# Patient Record
Sex: MALE | Race: WHITE | Employment: OTHER | URBAN - NONMETROPOLITAN AREA
[De-identification: names, ages, dates, MRNs, and addresses within clinical notes are randomized per-mention and may not be internally consistent; named-entity substitution may affect disease eponyms.]

---

## 2023-12-12 ENCOUNTER — APPOINTMENT (OUTPATIENT)
Dept: GENERAL RADIOLOGY | Age: 78
End: 2023-12-12
Payer: MEDICARE

## 2023-12-12 ENCOUNTER — HOSPITAL ENCOUNTER (EMERGENCY)
Age: 78
Discharge: HOME | End: 2023-12-12
Attending: EMERGENCY MEDICINE
Payer: MEDICARE

## 2023-12-12 VITALS
WEIGHT: 255 LBS | HEIGHT: 70 IN | OXYGEN SATURATION: 97 % | HEART RATE: 57 BPM | TEMPERATURE: 97.6 F | RESPIRATION RATE: 16 BRPM | SYSTOLIC BLOOD PRESSURE: 143 MMHG | BODY MASS INDEX: 36.51 KG/M2 | DIASTOLIC BLOOD PRESSURE: 76 MMHG

## 2023-12-12 DIAGNOSIS — M25.552 LEFT HIP PAIN: ICD-10-CM
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
## 2023-12-12 PROCEDURE — 90715 TDAP VACCINE 7 YRS/> IM: CPT

## 2023-12-12 PROCEDURE — 73502 X-RAY EXAM HIP UNI 2-3 VIEWS: CPT

## 2023-12-12 PROCEDURE — 90471 IMMUNIZATION ADMIN: CPT

## 2023-12-12 PROCEDURE — 6360000002 HC RX W HCPCS

## 2023-12-12 PROCEDURE — 99284 EMERGENCY DEPT VISIT MOD MDM: CPT

## 2023-12-12 RX ADMIN — TETANUS TOXOID, REDUCED DIPHTHERIA TOXOID AND ACELLULAR PERTUSSIS VACCINE, ADSORBED 0.5 ML: 5; 2.5; 8; 8; 2.5 SUSPENSION INTRAMUSCULAR at 18:55

## 2023-12-12 ASSESSMENT — PAIN DESCRIPTION - LOCATION: LOCATION: HIP

## 2023-12-12 ASSESSMENT — PAIN DESCRIPTION - PAIN TYPE: TYPE: ACUTE PAIN

## 2023-12-12 ASSESSMENT — PAIN DESCRIPTION - ORIENTATION: ORIENTATION: LEFT

## 2023-12-12 ASSESSMENT — PAIN DESCRIPTION - DESCRIPTORS: DESCRIPTORS: ACHING

## 2023-12-12 ASSESSMENT — PAIN - FUNCTIONAL ASSESSMENT: PAIN_FUNCTIONAL_ASSESSMENT: 0-10

## 2023-12-12 ASSESSMENT — PAIN SCALES - GENERAL: PAINLEVEL_OUTOF10: 4

## 2023-12-12 NOTE — ED PROVIDER NOTES

## 2023-12-12 NOTE — ED NOTES
Pt arrives to the ED for a mechanical fall that occurred today as the pt was attempting to get his trach to the curb. Pt states he tripped over the curb and landed on his left hip. Pt denies any loc, or dizziness at the time of the event. Pt states he is on eloquis. Pt rates his pain a 4/10.  STEVEN Negrete RN  12/12/23 [id number]

## 2023-12-12 NOTE — ED PROVIDER NOTES
[facility] DEPT  EMERGENCY DEPARTMENT ENCOUNTER          Pt Name: Kvng Sanchez  MRN: [id number]  [address] [facility] [place] 1945  Date of evaluation: 12/12/2023  Physician: Cornel Barron MD  Supervising Attending Physician: Willie Snow MD       CHIEF COMPLAINT       Chief Complaint   Patient presents with    Fall    Hip Pain         HISTORY OF PRESENT ILLNESS    HPI  Kvng Sanchez is a 78 y.o. male who with a tree of diabetes, COPD, paroxysmal A-fib on Eliquis 5 mg twice daily who presents for evaluation after mechanical fall when he tripped over the curb while taking out the trash. This afternoon, the patient was taking a trash bin out to the curb and not using his four-wheel walker. He fell onto his left hip and also injured his left forearm and has some dried blood and abrasions there. He did not hit his head, did not hurt his neck, did not lose consciousness, did not have any prodromal symptoms such as dizziness, headache, lightheadedness, chest pain, palpitations, shortness of breath. He was not able to help himself all the way to his feet and EMTs had to help him up into the stretcher. He has not taken steps since the incident occurred, though he thinks he probably could. Patient unsure when last received tetanus. REVIEW OF SYSTEMS   Review of Systems  As above in HPI    PAST MEDICAL AND SURGICAL HISTORY     Past Medical History:   Diagnosis Date    COPD (chronic obstructive pulmonary disease) ([facility])     Diabetes mellitus ([facility])      History reviewed. No pertinent surgical history. MEDICATIONS   noted to be mildly hypertensive. No current facility-administered medications for this encounter.     Current Outpatient Medications:     apixaban (ELIQUIS) 5 MG TABS tablet, Take 1 tablet by mouth 2 times daily, Disp: , Rfl:     Previous Medications    APIXABAN (ELIQUIS) 5 MG TABS TABLET    Take 1 tablet by mouth 2 times daily         SOCIAL HISTORY     Social History

## 2024-03-23 ENCOUNTER — HOSPITAL ENCOUNTER (EMERGENCY)
Age: 79
Discharge: HOME | End: 2024-03-23
Attending: EMERGENCY MEDICINE
Payer: MEDICARE

## 2024-03-23 ENCOUNTER — APPOINTMENT (OUTPATIENT)
Dept: GENERAL RADIOLOGY | Age: 79
End: 2024-03-23
Payer: MEDICARE

## 2024-03-23 ENCOUNTER — APPOINTMENT (OUTPATIENT)
Dept: CT IMAGING | Age: 79
End: 2024-03-23
Payer: MEDICARE

## 2024-03-23 VITALS
HEIGHT: 70 IN | RESPIRATION RATE: 13 BRPM | TEMPERATURE: 97.9 F | HEART RATE: 67 BPM | WEIGHT: 255 LBS | SYSTOLIC BLOOD PRESSURE: 150 MMHG | BODY MASS INDEX: 36.51 KG/M2 | DIASTOLIC BLOOD PRESSURE: 80 MMHG | OXYGEN SATURATION: 96 %

## 2024-03-23 DIAGNOSIS — H81.10 BENIGN PAROXYSMAL POSITIONAL VERTIGO, UNSPECIFIED LATERALITY: ICD-10-CM
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
## 2024-03-23 LAB
ALBUMIN SERPL BCG-MCNC: 3.9 G/DL (ref 3.5–5.1)
ALP SERPL-CCNC: 92 U/L (ref 38–126)
ALT SERPL W/O P-5'-P-CCNC: 11 U/L (ref 11–66)
ANION GAP SERPL CALC-SCNC: 17 MEQ/L (ref 8–16)
APTT PPP: 31.6 SECONDS (ref 22–38)
AST SERPL-CCNC: 17 U/L (ref 5–40)
BASOPHILS ABSOLUTE: 0.1 THOU/MM3 (ref 0–0.1)
BASOPHILS NFR BLD AUTO: 1 %
BILIRUB SERPL-MCNC: 0.7 MG/DL (ref 0.3–1.2)
BUN SERPL-MCNC: 16 MG/DL (ref 7–22)
CALCIUM SERPL-MCNC: 8.9 MG/DL (ref 8.5–10.5)
CHLORIDE SERPL-SCNC: 103 MEQ/L (ref 98–111)
CO2 SERPL-SCNC: 22 MEQ/L (ref 23–33)
CREAT SERPL-MCNC: 1.1 MG/DL (ref 0.4–1.2)
DEPRECATED RDW RBC AUTO: 48.5 FL (ref 35–45)
EOSINOPHIL NFR BLD AUTO: 1.6 %
EOSINOPHILS ABSOLUTE: 0.1 THOU/MM3 (ref 0–0.4)
ERYTHROCYTE [DISTWIDTH] IN BLOOD BY AUTOMATED COUNT: 13.9 % (ref 11.5–14.5)
GFR SERPL CREATININE-BSD FRML MDRD: > 60 ML/MIN/1.73M2
GLUCOSE SERPL-MCNC: 229 MG/DL (ref 70–108)
HCT VFR BLD AUTO: 43.5 % (ref 42–52)
HGB BLD-MCNC: 14.5 GM/DL (ref 14–18)
IMM GRANULOCYTES # BLD AUTO: 0.02 THOU/MM3 (ref 0–0.07)
IMM GRANULOCYTES NFR BLD AUTO: 0.2 %
INR PPP: 0.95 (ref 0.85–1.13)
LYMPHOCYTES ABSOLUTE: 2.4 THOU/MM3 (ref 1–4.8)
LYMPHOCYTES NFR BLD AUTO: 29.2 %
MAGNESIUM SERPL-MCNC: 1.9 MG/DL (ref 1.6–2.4)
MCH RBC QN AUTO: 31.6 PG (ref 26–33)
MCHC RBC AUTO-ENTMCNC: 33.3 GM/DL (ref 32.2–35.5)
MCV RBC AUTO: 94.8 FL (ref 80–94)
MONOCYTES ABSOLUTE: 0.8 THOU/MM3 (ref 0.4–1.3)
MONOCYTES NFR BLD AUTO: 9.3 %
NEUTROPHILS NFR BLD AUTO: 58.7 %
NRBC BLD AUTO-RTO: 0 /100 WBC
NT-PROBNP SERPL IA-MCNC: 800.2 PG/ML (ref 0–449)
OSMOLALITY SERPL CALC.SUM OF ELEC: 291.6 MOSMOL/KG (ref 275–300)
PLATELET # BLD AUTO: 257 THOU/MM3 (ref 130–400)
PMV BLD AUTO: 9.6 FL (ref 9.4–12.4)
POTASSIUM SERPL-SCNC: 3.9 MEQ/L (ref 3.5–5.2)
PROT SERPL-MCNC: 6.8 G/DL (ref 6.1–8)
RBC # BLD AUTO: 4.59 MILL/MM3 (ref 4.7–6.1)
SEGMENTED NEUTROPHILS ABSOLUTE COUNT: 4.9 THOU/MM3 (ref 1.8–7.7)
SODIUM SERPL-SCNC: 142 MEQ/L (ref 135–145)
TROPONIN, HIGH SENSITIVITY: 30 NG/L (ref 0–12)
TROPONIN, HIGH SENSITIVITY: 31 NG/L (ref 0–12)
WBC # BLD AUTO: 8.3 THOU/MM3 (ref 4.8–10.8)

## 2024-03-23 PROCEDURE — 85025 COMPLETE CBC W/AUTO DIFF WBC: CPT

## 2024-03-23 PROCEDURE — 72125 CT NECK SPINE W/O DYE: CPT

## 2024-03-23 PROCEDURE — 70450 CT HEAD/BRAIN W/O DYE: CPT

## 2024-03-23 PROCEDURE — 83880 ASSAY OF NATRIURETIC PEPTIDE: CPT

## 2024-03-23 PROCEDURE — 36415 COLL VENOUS BLD VENIPUNCTURE: CPT

## 2024-03-23 PROCEDURE — 85610 PROTHROMBIN TIME: CPT

## 2024-03-23 PROCEDURE — 80053 COMPREHEN METABOLIC PANEL: CPT

## 2024-03-23 PROCEDURE — 83735 ASSAY OF MAGNESIUM: CPT

## 2024-03-23 PROCEDURE — 99284 EMERGENCY DEPT VISIT MOD MDM: CPT

## 2024-03-23 PROCEDURE — 84484 ASSAY OF TROPONIN QUANT: CPT

## 2024-03-23 PROCEDURE — 72170 X-RAY EXAM OF PELVIS: CPT

## 2024-03-23 PROCEDURE — 71045 X-RAY EXAM CHEST 1 VIEW: CPT

## 2024-03-23 PROCEDURE — 85730 THROMBOPLASTIN TIME PARTIAL: CPT

## 2024-03-23 ASSESSMENT — PAIN DESCRIPTION - DESCRIPTORS: DESCRIPTORS: BURNING

## 2024-03-23 ASSESSMENT — PAIN - FUNCTIONAL ASSESSMENT: PAIN_FUNCTIONAL_ASSESSMENT: 0-10

## 2024-03-23 ASSESSMENT — PAIN DESCRIPTION - LOCATION: LOCATION: ARM

## 2024-03-23 ASSESSMENT — PAIN SCALES - GENERAL: PAINLEVEL_OUTOF10: 5

## 2024-03-24 NOTE — ED PROVIDER NOTES
I performed a history and physical examination of the patient and discussed management with the resident. I reviewed the resident’s note and agree with the documented findings and plan of care. Any areas of disagreement are noted on the chart. I was personally present for the key portions of any procedures. I have documented in the chart those procedures where I was not present during the key portions. I have reviewed the emergency nurses triage note. I agree with the chief complaint, past medical history, past surgical history, allergies, medications, social and family history as documented unless otherwise noted below. Documentation of the HPI, Physical Exam and Medical Decision Making performed by medical students or scribes is based on my personal performance of the HPI, PE and MDM. For Phys Assistant/ Nurse Practitioner cases/documentation I have personally evaluated this patient and have completed at least one if not all key elements of the E/M (history, physical exam, and MDM). My findings are as noted below.    In other words, I personally saw and examined the patient I have reviewed and agreed with the resident findings including all diagnostic interpretations and treatment plans as written.  I was present for the key portion of any procedures performed and the inclusive time noted in any critical care statement.    Patient presents today after a fall.  Apparently had a fall yesterday and fall today.  Today's fall happened approximately 1 hour prior to arrival.  Patient states that he lost his balance and fell back.  He had no loss of consciousness.  Did strike the back of his head.  Did scratch of his right forearm.  Patient states he is not really in any pain at this time.  They were concerned because he had some nausea and vomiting after the fall.  Here today the patient is neurologically intact.  He has a hematoma on the back of the skull.  Visual acuity appears to be intact.  Of note he does have some

## 2024-03-24 NOTE — ED PROVIDER NOTES
[facility] EMERGENCY DEPT  EMERGENCY DEPARTMENT ENCOUNTER          Pt Name: El Leo  MRN: [id number]  Birthdate 1945  Date of evaluation: 3/23/2024  Physician: Rashid Diaz MD  Supervising Attending Physician: No att. providers found       CHIEF COMPLAINT       Chief Complaint   Patient presents with    Fall         HISTORY OF PRESENT ILLNESS    HPI  El Leo is a 78 y.o. male who presents to the emergency department from home, brought in by EMS for evaluation of fall.  Per EMS, patient has multiple falls past couple days.  He had a fall yesterday afternoon and then again this afternoon.  Patient reports that this afternoon he was walking in the kitchen when his wife called for him.  He states that he turned around and lost his balance falling backwards.  Patient reports that he did hit the back of his head.  Patient denies any LOC.  Patient denies any pain anywhere.  Denies any neck or back pain.  He reports that he has not been taking his Eliquis for the past 5 days because he ran out.  Patient reports he thinks he is taking it for A-fib.  Patient denies any lightheadedness or dizziness prior to the fall.  The patient has no other acute complaints at this time.            PAST MEDICAL AND SURGICAL HISTORY     Past Medical History:   Diagnosis Date    COPD (chronic obstructive pulmonary disease) (HCC)     Diabetes mellitus (HCC)      History reviewed. No pertinent surgical history.      MEDICATIONS   No current facility-administered medications for this encounter.    Current Outpatient Medications:     apixaban (ELIQUIS) 5 MG TABS tablet, Take 1 tablet by mouth 2 times daily, Disp: , Rfl:     Discharge Medication List as of 3/23/2024 11:49 PM        CONTINUE these medications which have NOT CHANGED    Details   apixaban (ELIQUIS) 5 MG TABS tablet Take 1 tablet by mouth 2 times dailyHistorical Med               SOCIAL HISTORY     Social History     Social History Narrative

## 2024-03-24 NOTE — ED TRIAGE NOTES
Pt presents to ED via EMS due to fall. Pt fell approximately 1 hour ago and 24 hours ago. Pt states grandchildren called EMS today because of nausea and vomiting after the fall. Pt has a small laceration on head and arm from fall 24 hours ago. Pt states he hit his head but did not lose consciousness. Pt denies neck pain, shortness of breath, and chest pain at this time. Pt was taking Eloquis but stopped about 5 days ago because he ran out.

## 2024-03-24 NOTE — DISCHARGE INSTRUCTIONS
Return to the ED mainly for any change or worsening symptoms including chest pain, shortness of breath, dizziness, nausea or vomiting.

## 2024-03-31 SDOH — HEALTH STABILITY: PHYSICAL HEALTH: ON AVERAGE, HOW MANY DAYS PER WEEK DO YOU ENGAGE IN MODERATE TO STRENUOUS EXERCISE (LIKE A BRISK WALK)?: 0 DAYS

## 2024-04-02 NOTE — PROGRESS NOTES
cervical carotid arteries and vertebral flow.     FINDINGS:   RIGHT CCA:                          Normal.                                    PSV: 74.2 cm/sec. EDV:   10.3 cm/sec.   RIGHT ICA PROXIMAL:                 Normal.                                    PSV: 90.8 cm/sec. EDV:   0.0 cm/sec.   RIGHT ICA DISTAL:                   Normal.                                    PSV: 52.0 cm/sec. EDV:   11.6 cm/sec.   RIGHT ECA:                          Normal.                                    PSV: 64.1 cm/sec. EDV:   12.0 cm/sec.   RIGHT VERTEBRAL:                    Not well visualized.   ICA/CCA SYSTOLIC RATIO:             1.2       LEFT CCA:                           Normal.                                    PSV: 64.4 cm/sec. EDV:   12.8 cm/sec.   LEFT ICA PROXIMAL:                  Normal.                                    PSV: 77.4 cm/sec. EDV:   19.2 cm/sec.   LEFT ICA DISTAL:                    Normal.                                    PSV: 75.8 cm/sec. EDV:   17.6 cm/sec.   ECA:                                Normal.                                    PSV: 59.6 cm/sec. EDV:   0.0 cm/sec.   LEFT VERTEBRAL:                     Not well visualized.   ICA/CCA SYSTOLIC RATIO:             1.2     OTHER:                              Negative.         GUIDELINES:   STENOSIS          ICA PSV                     PLAQUE                        ICA/CCA PSV RATIO   Normal            <125 cm/sec                 None                          <2.0                                                Continued Report - Page 2 of 2     Patient Name: FAIZA TIAN JR                                            YOB: 1945   MRN: [id number]                           Patient Type: Out                  Accession Number: [id number]   Account: [id number]                     Room #:                            Age: 76              Sex: M   Distributed to: MALENA KEBEDE                                          Date:

## 2024-04-02 NOTE — PATIENT INSTRUCTIONS
LAB INSTRUCTIONS:    Please complete labs within 4 week(s).    Please fast for 8 hours prior to lab collection.    The clinic will call you within 1 week of collection. If you have not heard from us within that amount of time, please call us at [phone].

## 2024-04-03 ENCOUNTER — TELEPHONE (OUTPATIENT)
Dept: CARDIOLOGY CLINIC | Age: 79
End: 2024-04-03

## 2024-04-03 ENCOUNTER — OFFICE VISIT (OUTPATIENT)
Dept: FAMILY MEDICINE CLINIC | Age: 79
End: 2024-04-03
Payer: MEDICARE

## 2024-04-03 VITALS
OXYGEN SATURATION: 95 % | DIASTOLIC BLOOD PRESSURE: 80 MMHG | BODY MASS INDEX: 34.07 KG/M2 | TEMPERATURE: 97.7 F | WEIGHT: 238 LBS | HEIGHT: 70 IN | RESPIRATION RATE: 18 BRPM | HEART RATE: 54 BPM | SYSTOLIC BLOOD PRESSURE: 130 MMHG

## 2024-04-03 DIAGNOSIS — I25.10 ASHD (ARTERIOSCLEROTIC HEART DISEASE): ICD-10-CM

## 2024-04-03 DIAGNOSIS — E11.69 TYPE 2 DIABETES MELLITUS WITH OTHER SPECIFIED COMPLICATION, WITH LONG-TERM CURRENT USE OF INSULIN (HCC): ICD-10-CM

## 2024-04-03 DIAGNOSIS — I10 HYPERTENSION, ESSENTIAL: ICD-10-CM

## 2024-04-03 DIAGNOSIS — Z87.442 HISTORY OF KIDNEY STONES: ICD-10-CM

## 2024-04-03 DIAGNOSIS — J96.11 CHRONIC RESPIRATORY FAILURE WITH HYPOXIA (HCC): ICD-10-CM

## 2024-04-03 DIAGNOSIS — K21.9 GASTROESOPHAGEAL REFLUX DISEASE, UNSPECIFIED WHETHER ESOPHAGITIS PRESENT: ICD-10-CM

## 2024-04-03 DIAGNOSIS — R26.89 BALANCE PROBLEM: ICD-10-CM

## 2024-04-03 DIAGNOSIS — E78.5 DYSLIPIDEMIA: ICD-10-CM

## 2024-04-03 DIAGNOSIS — Z86.73 HISTORY OF CVA (CEREBROVASCULAR ACCIDENT): ICD-10-CM

## 2024-04-03 DIAGNOSIS — Z86.79 H/O SICK SINUS SYNDROME: ICD-10-CM

## 2024-04-03 DIAGNOSIS — J44.9 CHRONIC OBSTRUCTIVE PULMONARY DISEASE, UNSPECIFIED COPD TYPE (HCC): ICD-10-CM

## 2024-04-03 DIAGNOSIS — Z79.4 TYPE 2 DIABETES MELLITUS WITH OTHER SPECIFIED COMPLICATION, WITH LONG-TERM CURRENT USE OF INSULIN (HCC): ICD-10-CM

## 2024-04-03 DIAGNOSIS — I48.0 PAROXYSMAL ATRIAL FIBRILLATION (HCC): ICD-10-CM

## 2024-04-03 DIAGNOSIS — G47.33 OSA ON CPAP: ICD-10-CM

## 2024-04-03 DIAGNOSIS — E66.9 OBESITY (BMI 30-39.9): ICD-10-CM

## 2024-04-03 DIAGNOSIS — J61 ASBESTOSIS (HCC): ICD-10-CM

## 2024-04-03 DIAGNOSIS — R42 CHRONIC VERTIGO: Primary | ICD-10-CM

## 2024-04-03 DIAGNOSIS — M51.36 DDD (DEGENERATIVE DISC DISEASE), LUMBAR: ICD-10-CM

## 2024-04-03 DIAGNOSIS — R97.20 ELEVATED PSA: ICD-10-CM

## 2024-04-03 PROBLEM — I48.91 ATRIAL FIBRILLATION (HCC): Status: ACTIVE | Noted: 2024-04-03

## 2024-04-03 PROBLEM — E11.9 DM2 (DIABETES MELLITUS, TYPE 2) (HCC): Chronic | Status: ACTIVE | Noted: 2024-04-03

## 2024-04-03 PROBLEM — I48.91 ATRIAL FIBRILLATION (HCC): Chronic | Status: ACTIVE | Noted: 2024-04-03

## 2024-04-03 PROBLEM — M51.369 DDD (DEGENERATIVE DISC DISEASE), LUMBAR: Chronic | Status: ACTIVE | Noted: 2024-04-03

## 2024-04-03 PROBLEM — E11.9 DM2 (DIABETES MELLITUS, TYPE 2) (HCC): Status: ACTIVE | Noted: 2024-04-03

## 2024-04-03 PROBLEM — M18.11 OSTEOARTHRITIS OF CARPOMETACARPAL (CMC) JOINT OF RIGHT THUMB: Status: ACTIVE | Noted: 2024-04-03

## 2024-04-03 PROBLEM — M51.369 DDD (DEGENERATIVE DISC DISEASE), LUMBAR: Status: ACTIVE | Noted: 2024-04-03

## 2024-04-03 PROBLEM — M18.11 OSTEOARTHRITIS OF CARPOMETACARPAL (CMC) JOINT OF RIGHT THUMB: Chronic | Status: ACTIVE | Noted: 2024-04-03

## 2024-04-03 LAB — HBA1C MFR BLD: 8 % (ref 4.3–5.7)

## 2024-04-03 PROCEDURE — 3075F SYST BP GE 130 - 139MM HG: CPT | Performed by: FAMILY MEDICINE

## 2024-04-03 PROCEDURE — 1123F ACP DISCUSS/DSCN MKR DOCD: CPT | Performed by: FAMILY MEDICINE

## 2024-04-03 PROCEDURE — G8417 CALC BMI ABV UP PARAM F/U: HCPCS | Performed by: FAMILY MEDICINE

## 2024-04-03 PROCEDURE — 3052F HG A1C>EQUAL 8.0%<EQUAL 9.0%: CPT | Performed by: FAMILY MEDICINE

## 2024-04-03 PROCEDURE — 99205 OFFICE O/P NEW HI 60 MIN: CPT | Performed by: FAMILY MEDICINE

## 2024-04-03 PROCEDURE — G8427 DOCREV CUR MEDS BY ELIG CLIN: HCPCS | Performed by: FAMILY MEDICINE

## 2024-04-03 PROCEDURE — 1036F TOBACCO NON-USER: CPT | Performed by: FAMILY MEDICINE

## 2024-04-03 PROCEDURE — 3023F SPIROM DOC REV: CPT | Performed by: FAMILY MEDICINE

## 2024-04-03 PROCEDURE — 3079F DIAST BP 80-89 MM HG: CPT | Performed by: FAMILY MEDICINE

## 2024-04-03 RX ORDER — LISINOPRIL AND HYDROCHLOROTHIAZIDE 25; 20 MG/1; MG/1
1 TABLET ORAL DAILY
Status: ON HOLD | COMMUNITY

## 2024-04-03 RX ORDER — ACETAMINOPHEN 160 MG
2000 TABLET,DISINTEGRATING ORAL DAILY
Status: ON HOLD | COMMUNITY

## 2024-04-03 RX ORDER — MECLIZINE HCL 12.5 MG/1
12.5 TABLET ORAL 3 TIMES DAILY PRN
Status: ON HOLD | COMMUNITY

## 2024-04-03 RX ORDER — INSULIN LISPRO 100 [IU]/ML
INJECTION, SOLUTION INTRAVENOUS; SUBCUTANEOUS
Status: ON HOLD | COMMUNITY

## 2024-04-03 RX ORDER — BUDESONIDE AND FORMOTEROL FUMARATE DIHYDRATE 160; 4.5 UG/1; UG/1
2 AEROSOL RESPIRATORY (INHALATION) 2 TIMES DAILY
Status: ON HOLD | COMMUNITY

## 2024-04-03 RX ORDER — FLUTICASONE PROPIONATE 50 MCG
1 SPRAY, SUSPENSION (ML) NASAL DAILY PRN
Status: ON HOLD | COMMUNITY

## 2024-04-03 RX ORDER — ATORVASTATIN CALCIUM 80 MG/1
80 TABLET, FILM COATED ORAL DAILY
Status: ON HOLD | COMMUNITY

## 2024-04-03 RX ORDER — MULTIVITAMIN WITH IRON
250 TABLET ORAL DAILY
Status: ON HOLD | COMMUNITY

## 2024-04-03 RX ORDER — METOPROLOL TARTRATE 50 MG/1
50 TABLET, FILM COATED ORAL 2 TIMES DAILY
Status: ON HOLD | COMMUNITY

## 2024-04-03 RX ORDER — OMEPRAZOLE 20 MG/1
20 CAPSULE, DELAYED RELEASE ORAL DAILY
Status: ON HOLD | COMMUNITY

## 2024-04-03 RX ORDER — ALLOPURINOL 300 MG/1
300 TABLET ORAL DAILY
Status: ON HOLD | COMMUNITY

## 2024-04-03 RX ORDER — INSULIN GLARGINE 100 [IU]/ML
INJECTION, SOLUTION SUBCUTANEOUS
Status: ON HOLD | COMMUNITY

## 2024-04-03 RX ORDER — ALBUTEROL SULFATE 90 UG/1
2 AEROSOL, METERED RESPIRATORY (INHALATION) EVERY 4 HOURS PRN
Status: ON HOLD | COMMUNITY
Start: 2017-04-26

## 2024-04-03 SDOH — ECONOMIC STABILITY: FOOD INSECURITY: WITHIN THE PAST 12 MONTHS, THE FOOD YOU BOUGHT JUST DIDN'T LAST AND YOU DIDN'T HAVE MONEY TO GET MORE.: NEVER TRUE

## 2024-04-03 SDOH — ECONOMIC STABILITY: FOOD INSECURITY: WITHIN THE PAST 12 MONTHS, YOU WORRIED THAT YOUR FOOD WOULD RUN OUT BEFORE YOU GOT MONEY TO BUY MORE.: NEVER TRUE

## 2024-04-03 SDOH — ECONOMIC STABILITY: HOUSING INSECURITY
IN THE LAST 12 MONTHS, WAS THERE A TIME WHEN YOU DID NOT HAVE A STEADY PLACE TO SLEEP OR SLEPT IN A SHELTER (INCLUDING NOW)?: NO

## 2024-04-03 SDOH — ECONOMIC STABILITY: INCOME INSECURITY: HOW HARD IS IT FOR YOU TO PAY FOR THE VERY BASICS LIKE FOOD, HOUSING, MEDICAL CARE, AND HEATING?: NOT HARD AT ALL

## 2024-04-03 ASSESSMENT — PATIENT HEALTH QUESTIONNAIRE - PHQ9
SUM OF ALL RESPONSES TO PHQ QUESTIONS 1-9: 0
SUM OF ALL RESPONSES TO PHQ9 QUESTIONS 1 & 2: 0
2. FEELING DOWN, DEPRESSED OR HOPELESS: NOT AT ALL
1. LITTLE INTEREST OR PLEASURE IN DOING THINGS: NOT AT ALL
SUM OF ALL RESPONSES TO PHQ QUESTIONS 1-9: 0

## 2024-04-03 NOTE — TELEPHONE ENCOUNTER
LM for pt to return call.    Pt is needing a NP appt.  Diagnosis   I25.10 (ICD-10-CM) - ASHD (arteriosclerotic heart disease)   I48.0 (ICD-10-CM) - Paroxysmal atrial fibrillation

## 2024-04-04 ENCOUNTER — CARE COORDINATION (OUTPATIENT)
Dept: CARE COORDINATION | Age: 79
End: 2024-04-04

## 2024-04-04 NOTE — CARE COORDINATION
Spoke with daughter and I am mailing them his portion of the [employer] geraldo and faxing Paulette his portion.

## 2024-04-04 NOTE — CARE COORDINATION
Left patients daughter a message to give me a call back to go over the [facility] program that the patient has been in previously .         [facility]  CC   Medication Assistance  [facility] [place] [employer]    (P) [phone]  (F) [phone]

## 2024-04-06 ENCOUNTER — APPOINTMENT (OUTPATIENT)
Dept: CT IMAGING | Age: 79
End: 2024-04-06
Payer: MEDICARE

## 2024-04-06 ENCOUNTER — HOSPITAL ENCOUNTER (INPATIENT)
Age: 79
LOS: 12 days | Discharge: OTHER INSTITUTION | End: 2024-04-18
Attending: EMERGENCY MEDICINE | Admitting: INTERNAL MEDICINE
Payer: MEDICARE

## 2024-04-06 ENCOUNTER — APPOINTMENT (OUTPATIENT)
Dept: GENERAL RADIOLOGY | Age: 79
End: 2024-04-06
Payer: MEDICARE

## 2024-04-06 DIAGNOSIS — R29.6 FREQUENT FALLS: ICD-10-CM

## 2024-04-06 DIAGNOSIS — I48.20 CHRONIC ATRIAL FIBRILLATION (HCC): ICD-10-CM

## 2024-04-06 DIAGNOSIS — R41.0 DELIRIUM: ICD-10-CM

## 2024-04-06 DIAGNOSIS — E87.6 HYPOKALEMIA: ICD-10-CM

## 2024-04-06 DIAGNOSIS — R41.82 ALTERED MENTAL STATUS, UNSPECIFIED ALTERED MENTAL STATUS TYPE: Primary | ICD-10-CM

## 2024-04-06 LAB
ALBUMIN SERPL BCG-MCNC: 4.1 G/DL (ref 3.5–5.1)
ALP SERPL-CCNC: 100 U/L (ref 38–126)
ALT SERPL W/O P-5'-P-CCNC: 9 U/L (ref 11–66)
AMMONIA PLAS-MCNC: 11 UMOL/L (ref 11–60)
ANION GAP SERPL CALC-SCNC: 13 MEQ/L (ref 8–16)
APTT PPP: 32.7 SECONDS (ref 22–38)
AST SERPL-CCNC: 16 U/L (ref 5–40)
BASOPHILS ABSOLUTE: 0.1 THOU/MM3 (ref 0–0.1)
BASOPHILS NFR BLD AUTO: 0.9 %
BILIRUB CONJ SERPL-MCNC: 0.3 MG/DL (ref 0–0.3)
BILIRUB SERPL-MCNC: 1 MG/DL (ref 0.3–1.2)
BILIRUB UR QL STRIP.AUTO: NEGATIVE
BUN SERPL-MCNC: 16 MG/DL (ref 7–22)
CALCIUM SERPL-MCNC: 9.3 MG/DL (ref 8.5–10.5)
CHARACTER UR: CLEAR
CHLORIDE SERPL-SCNC: 100 MEQ/L (ref 98–111)
CK SERPL-CCNC: 60 U/L (ref 55–170)
CO2 SERPL-SCNC: 28 MEQ/L (ref 23–33)
COLOR: YELLOW
CREAT SERPL-MCNC: 0.9 MG/DL (ref 0.4–1.2)
DEPRECATED RDW RBC AUTO: 47.4 FL (ref 35–45)
EOSINOPHIL NFR BLD AUTO: 1.4 %
EOSINOPHILS ABSOLUTE: 0.1 THOU/MM3 (ref 0–0.4)
ERYTHROCYTE [DISTWIDTH] IN BLOOD BY AUTOMATED COUNT: 13.6 % (ref 11.5–14.5)
GFR SERPL CREATININE-BSD FRML MDRD: 86 ML/MIN/1.73M2
GLUCOSE SERPL-MCNC: 110 MG/DL (ref 70–108)
GLUCOSE UR QL STRIP.AUTO: NEGATIVE MG/DL
HCT VFR BLD AUTO: 44.5 % (ref 42–52)
HGB BLD-MCNC: 14.7 GM/DL (ref 14–18)
HGB UR QL STRIP.AUTO: NEGATIVE
IMM GRANULOCYTES # BLD AUTO: 0.02 THOU/MM3 (ref 0–0.07)
IMM GRANULOCYTES NFR BLD AUTO: 0.2 %
INR PPP: 0.98 (ref 0.85–1.13)
KETONES UR QL STRIP.AUTO: ABNORMAL
LACTIC ACID, SEPSIS: 1.7 MMOL/L (ref 0.5–1.9)
LYMPHOCYTES ABSOLUTE: 2.2 THOU/MM3 (ref 1–4.8)
LYMPHOCYTES NFR BLD AUTO: 23.9 %
MAGNESIUM SERPL-MCNC: 1.7 MG/DL (ref 1.6–2.4)
MCH RBC QN AUTO: 31.3 PG (ref 26–33)
MCHC RBC AUTO-ENTMCNC: 33 GM/DL (ref 32.2–35.5)
MCV RBC AUTO: 94.7 FL (ref 80–94)
MONOCYTES ABSOLUTE: 0.8 THOU/MM3 (ref 0.4–1.3)
MONOCYTES NFR BLD AUTO: 8.8 %
NEUTROPHILS NFR BLD AUTO: 64.8 %
NITRITE UR QL STRIP: NEGATIVE
NRBC BLD AUTO-RTO: 0 /100 WBC
NT-PROBNP SERPL IA-MCNC: 639.8 PG/ML (ref 0–449)
OSMOLALITY SERPL CALC.SUM OF ELEC: 283.1 MOSMOL/KG (ref 275–300)
PH UR STRIP.AUTO: 5.5 [PH] (ref 5–9)
PLATELET # BLD AUTO: 278 THOU/MM3 (ref 130–400)
PMV BLD AUTO: 9.8 FL (ref 9.4–12.4)
POTASSIUM SERPL-SCNC: 3.2 MEQ/L (ref 3.5–5.2)
PROT SERPL-MCNC: 6.9 G/DL (ref 6.1–8)
PROT UR STRIP.AUTO-MCNC: NEGATIVE MG/DL
RBC # BLD AUTO: 4.7 MILL/MM3 (ref 4.7–6.1)
SEGMENTED NEUTROPHILS ABSOLUTE COUNT: 6 THOU/MM3 (ref 1.8–7.7)
SODIUM SERPL-SCNC: 141 MEQ/L (ref 135–145)
SP GR UR REFRACT.AUTO: 1.01 (ref 1–1.03)
TROPONIN, HIGH SENSITIVITY: 39 NG/L (ref 0–12)
TSH SERPL DL<=0.005 MIU/L-ACNC: 0.53 UIU/ML (ref 0.4–4.2)
UROBILINOGEN, URINE: 0.2 EU/DL (ref 0–1)
WBC # BLD AUTO: 9.2 THOU/MM3 (ref 4.8–10.8)
WBC #/AREA URNS HPF: NEGATIVE /[HPF]

## 2024-04-06 PROCEDURE — 83880 ASSAY OF NATRIURETIC PEPTIDE: CPT

## 2024-04-06 PROCEDURE — 87040 BLOOD CULTURE FOR BACTERIA: CPT

## 2024-04-06 PROCEDURE — 1200000003 HC TELEMETRY R&B

## 2024-04-06 PROCEDURE — 83605 ASSAY OF LACTIC ACID: CPT

## 2024-04-06 PROCEDURE — 80053 COMPREHEN METABOLIC PANEL: CPT

## 2024-04-06 PROCEDURE — 99285 EMERGENCY DEPT VISIT HI MDM: CPT

## 2024-04-06 PROCEDURE — 72125 CT NECK SPINE W/O DYE: CPT

## 2024-04-06 PROCEDURE — 71045 X-RAY EXAM CHEST 1 VIEW: CPT

## 2024-04-06 PROCEDURE — 1200000000 HC SEMI PRIVATE

## 2024-04-06 PROCEDURE — 82248 BILIRUBIN DIRECT: CPT

## 2024-04-06 PROCEDURE — 85610 PROTHROMBIN TIME: CPT

## 2024-04-06 PROCEDURE — 82550 ASSAY OF CK (CPK): CPT

## 2024-04-06 PROCEDURE — 83735 ASSAY OF MAGNESIUM: CPT

## 2024-04-06 PROCEDURE — 84484 ASSAY OF TROPONIN QUANT: CPT

## 2024-04-06 PROCEDURE — 85730 THROMBOPLASTIN TIME PARTIAL: CPT

## 2024-04-06 PROCEDURE — 84443 ASSAY THYROID STIM HORMONE: CPT

## 2024-04-06 PROCEDURE — 70450 CT HEAD/BRAIN W/O DYE: CPT

## 2024-04-06 PROCEDURE — 93005 ELECTROCARDIOGRAM TRACING: CPT | Performed by: EMERGENCY MEDICINE

## 2024-04-06 PROCEDURE — 36415 COLL VENOUS BLD VENIPUNCTURE: CPT

## 2024-04-06 PROCEDURE — 82140 ASSAY OF AMMONIA: CPT

## 2024-04-06 PROCEDURE — 2580000003 HC RX 258: Performed by: EMERGENCY MEDICINE

## 2024-04-06 PROCEDURE — 81003 URINALYSIS AUTO W/O SCOPE: CPT

## 2024-04-06 PROCEDURE — 85025 COMPLETE CBC W/AUTO DIFF WBC: CPT

## 2024-04-06 RX ORDER — SODIUM CHLORIDE 9 MG/ML
INJECTION, SOLUTION INTRAVENOUS CONTINUOUS
Status: DISCONTINUED | OUTPATIENT
Start: 2024-04-06 | End: 2024-04-07 | Stop reason: DRUGHIGH

## 2024-04-06 RX ADMIN — SODIUM CHLORIDE: 9 INJECTION, SOLUTION INTRAVENOUS at 21:40

## 2024-04-06 ASSESSMENT — PAIN - FUNCTIONAL ASSESSMENT
PAIN_FUNCTIONAL_ASSESSMENT: NONE - DENIES PAIN

## 2024-04-07 LAB
ANION GAP SERPL CALC-SCNC: 14 MEQ/L (ref 8–16)
BUN SERPL-MCNC: 15 MG/DL (ref 7–22)
CALCIUM SERPL-MCNC: 8.5 MG/DL (ref 8.5–10.5)
CHLORIDE SERPL-SCNC: 105 MEQ/L (ref 98–111)
CO2 SERPL-SCNC: 25 MEQ/L (ref 23–33)
CREAT SERPL-MCNC: 0.8 MG/DL (ref 0.4–1.2)
DEPRECATED RDW RBC AUTO: 47.5 FL (ref 35–45)
ERYTHROCYTE [DISTWIDTH] IN BLOOD BY AUTOMATED COUNT: 13.9 % (ref 11.5–14.5)
GFR SERPL CREATININE-BSD FRML MDRD: 89 ML/MIN/1.73M2
GLUCOSE BLD STRIP.AUTO-MCNC: 107 MG/DL (ref 70–108)
GLUCOSE BLD STRIP.AUTO-MCNC: 118 MG/DL (ref 70–108)
GLUCOSE BLD STRIP.AUTO-MCNC: 72 MG/DL (ref 70–108)
GLUCOSE BLD STRIP.AUTO-MCNC: 78 MG/DL (ref 70–108)
GLUCOSE SERPL-MCNC: 68 MG/DL (ref 70–108)
HCT VFR BLD AUTO: 40.2 % (ref 42–52)
HGB BLD-MCNC: 13.4 GM/DL (ref 14–18)
MCH RBC QN AUTO: 31.2 PG (ref 26–33)
MCHC RBC AUTO-ENTMCNC: 33.3 GM/DL (ref 32.2–35.5)
MCV RBC AUTO: 93.5 FL (ref 80–94)
PLATELET # BLD AUTO: 248 THOU/MM3 (ref 130–400)
PMV BLD AUTO: 9.7 FL (ref 9.4–12.4)
POTASSIUM SERPL-SCNC: 2.9 MEQ/L (ref 3.5–5.2)
POTASSIUM SERPL-SCNC: 3.2 MEQ/L (ref 3.5–5.2)
RBC # BLD AUTO: 4.3 MILL/MM3 (ref 4.7–6.1)
SODIUM SERPL-SCNC: 144 MEQ/L (ref 135–145)
TROPONIN, HIGH SENSITIVITY: 34 NG/L (ref 0–12)
TROPONIN, HIGH SENSITIVITY: 39 NG/L (ref 0–12)
WBC # BLD AUTO: 11.4 THOU/MM3 (ref 4.8–10.8)

## 2024-04-07 PROCEDURE — 85027 COMPLETE CBC AUTOMATED: CPT

## 2024-04-07 PROCEDURE — 6370000000 HC RX 637 (ALT 250 FOR IP): Performed by: INTERNAL MEDICINE

## 2024-04-07 PROCEDURE — 93010 ELECTROCARDIOGRAM REPORT: CPT | Performed by: INTERNAL MEDICINE

## 2024-04-07 PROCEDURE — 80048 BASIC METABOLIC PNL TOTAL CA: CPT

## 2024-04-07 PROCEDURE — 94640 AIRWAY INHALATION TREATMENT: CPT

## 2024-04-07 PROCEDURE — 1200000003 HC TELEMETRY R&B

## 2024-04-07 PROCEDURE — 2580000003 HC RX 258: Performed by: EMERGENCY MEDICINE

## 2024-04-07 PROCEDURE — 36415 COLL VENOUS BLD VENIPUNCTURE: CPT

## 2024-04-07 PROCEDURE — 1200000000 HC SEMI PRIVATE

## 2024-04-07 PROCEDURE — 2580000003 HC RX 258: Performed by: INTERNAL MEDICINE

## 2024-04-07 PROCEDURE — 84132 ASSAY OF SERUM POTASSIUM: CPT

## 2024-04-07 PROCEDURE — 82948 REAGENT STRIP/BLOOD GLUCOSE: CPT

## 2024-04-07 PROCEDURE — 84484 ASSAY OF TROPONIN QUANT: CPT

## 2024-04-07 PROCEDURE — 6360000002 HC RX W HCPCS: Performed by: INTERNAL MEDICINE

## 2024-04-07 RX ORDER — SODIUM CHLORIDE 9 MG/ML
INJECTION, SOLUTION INTRAVENOUS CONTINUOUS
Status: DISCONTINUED | OUTPATIENT
Start: 2024-04-07 | End: 2024-04-09

## 2024-04-07 RX ORDER — GLUCAGON 1 MG/ML
1 KIT INJECTION PRN
Status: DISCONTINUED | OUTPATIENT
Start: 2024-04-07 | End: 2024-04-18 | Stop reason: HOSPADM

## 2024-04-07 RX ORDER — ACETAMINOPHEN 325 MG/1
650 TABLET ORAL EVERY 6 HOURS PRN
Status: DISCONTINUED | OUTPATIENT
Start: 2024-04-07 | End: 2024-04-18 | Stop reason: HOSPADM

## 2024-04-07 RX ORDER — ONDANSETRON 2 MG/ML
4 INJECTION INTRAMUSCULAR; INTRAVENOUS EVERY 6 HOURS PRN
Status: DISCONTINUED | OUTPATIENT
Start: 2024-04-07 | End: 2024-04-18 | Stop reason: HOSPADM

## 2024-04-07 RX ORDER — POTASSIUM CHLORIDE 20 MEQ/1
40 TABLET, EXTENDED RELEASE ORAL PRN
Status: DISCONTINUED | OUTPATIENT
Start: 2024-04-07 | End: 2024-04-18 | Stop reason: HOSPADM

## 2024-04-07 RX ORDER — LORAZEPAM 2 MG/ML
0.5 INJECTION INTRAMUSCULAR ONCE
Status: COMPLETED | OUTPATIENT
Start: 2024-04-07 | End: 2024-04-07

## 2024-04-07 RX ORDER — DEXTROSE MONOHYDRATE 100 MG/ML
INJECTION, SOLUTION INTRAVENOUS CONTINUOUS PRN
Status: DISCONTINUED | OUTPATIENT
Start: 2024-04-07 | End: 2024-04-18 | Stop reason: HOSPADM

## 2024-04-07 RX ORDER — FERROUS SULFATE 325(65) MG
325 TABLET ORAL
Status: DISCONTINUED | OUTPATIENT
Start: 2024-04-07 | End: 2024-04-18 | Stop reason: HOSPADM

## 2024-04-07 RX ORDER — LANOLIN ALCOHOL/MO/W.PET/CERES
250 CREAM (GRAM) TOPICAL DAILY
Status: DISCONTINUED | OUTPATIENT
Start: 2024-04-07 | End: 2024-04-18 | Stop reason: HOSPADM

## 2024-04-07 RX ORDER — SODIUM CHLORIDE 9 MG/ML
INJECTION, SOLUTION INTRAVENOUS PRN
Status: DISCONTINUED | OUTPATIENT
Start: 2024-04-07 | End: 2024-04-18 | Stop reason: HOSPADM

## 2024-04-07 RX ORDER — INSULIN LISPRO 100 [IU]/ML
0-4 INJECTION, SOLUTION INTRAVENOUS; SUBCUTANEOUS
Status: DISCONTINUED | OUTPATIENT
Start: 2024-04-07 | End: 2024-04-18 | Stop reason: HOSPADM

## 2024-04-07 RX ORDER — MULTIVITAMIN WITH IRON
250 TABLET ORAL DAILY
Status: DISCONTINUED | OUTPATIENT
Start: 2024-04-07 | End: 2024-04-07 | Stop reason: SDUPTHER

## 2024-04-07 RX ORDER — SODIUM CHLORIDE 0.9 % (FLUSH) 0.9 %
5-40 SYRINGE (ML) INJECTION PRN
Status: DISCONTINUED | OUTPATIENT
Start: 2024-04-07 | End: 2024-04-18 | Stop reason: HOSPADM

## 2024-04-07 RX ORDER — POTASSIUM CHLORIDE 7.45 MG/ML
10 INJECTION INTRAVENOUS PRN
Status: DISCONTINUED | OUTPATIENT
Start: 2024-04-07 | End: 2024-04-18 | Stop reason: HOSPADM

## 2024-04-07 RX ORDER — OLANZAPINE 5 MG/1
5 TABLET ORAL NIGHTLY
Status: DISCONTINUED | OUTPATIENT
Start: 2024-04-07 | End: 2024-04-09

## 2024-04-07 RX ORDER — ATORVASTATIN CALCIUM 80 MG/1
80 TABLET, FILM COATED ORAL NIGHTLY
Status: DISCONTINUED | OUTPATIENT
Start: 2024-04-07 | End: 2024-04-18 | Stop reason: HOSPADM

## 2024-04-07 RX ORDER — MAGNESIUM SULFATE IN WATER 40 MG/ML
2000 INJECTION, SOLUTION INTRAVENOUS PRN
Status: DISCONTINUED | OUTPATIENT
Start: 2024-04-07 | End: 2024-04-18 | Stop reason: HOSPADM

## 2024-04-07 RX ORDER — ACETAMINOPHEN 650 MG/1
650 SUPPOSITORY RECTAL EVERY 6 HOURS PRN
Status: DISCONTINUED | OUTPATIENT
Start: 2024-04-07 | End: 2024-04-18 | Stop reason: HOSPADM

## 2024-04-07 RX ORDER — LORAZEPAM 2 MG/ML
0.5 INJECTION INTRAMUSCULAR EVERY 6 HOURS PRN
Status: DISCONTINUED | OUTPATIENT
Start: 2024-04-07 | End: 2024-04-13

## 2024-04-07 RX ORDER — PNV NO.95/FERROUS FUM/FOLIC AC 28MG-0.8MG
250 TABLET ORAL DAILY
Status: DISCONTINUED | OUTPATIENT
Start: 2024-04-07 | End: 2024-04-18 | Stop reason: HOSPADM

## 2024-04-07 RX ORDER — SODIUM CHLORIDE 0.9 % (FLUSH) 0.9 %
5-40 SYRINGE (ML) INJECTION EVERY 12 HOURS SCHEDULED
Status: DISCONTINUED | OUTPATIENT
Start: 2024-04-07 | End: 2024-04-18 | Stop reason: HOSPADM

## 2024-04-07 RX ORDER — HALOPERIDOL 5 MG/ML
2 INJECTION INTRAMUSCULAR EVERY 6 HOURS PRN
Status: DISCONTINUED | OUTPATIENT
Start: 2024-04-07 | End: 2024-04-13

## 2024-04-07 RX ORDER — INSULIN LISPRO 100 [IU]/ML
0-4 INJECTION, SOLUTION INTRAVENOUS; SUBCUTANEOUS NIGHTLY
Status: DISCONTINUED | OUTPATIENT
Start: 2024-04-07 | End: 2024-04-18 | Stop reason: HOSPADM

## 2024-04-07 RX ORDER — LISINOPRIL 20 MG/1
20 TABLET ORAL DAILY
Status: DISCONTINUED | OUTPATIENT
Start: 2024-04-07 | End: 2024-04-18 | Stop reason: HOSPADM

## 2024-04-07 RX ORDER — ONDANSETRON 4 MG/1
4 TABLET, ORALLY DISINTEGRATING ORAL EVERY 8 HOURS PRN
Status: DISCONTINUED | OUTPATIENT
Start: 2024-04-07 | End: 2024-04-18 | Stop reason: HOSPADM

## 2024-04-07 RX ORDER — POLYETHYLENE GLYCOL 3350 17 G/17G
17 POWDER, FOR SOLUTION ORAL DAILY PRN
Status: DISCONTINUED | OUTPATIENT
Start: 2024-04-07 | End: 2024-04-18 | Stop reason: HOSPADM

## 2024-04-07 RX ADMIN — POTASSIUM CHLORIDE 10 MEQ: 7.46 INJECTION, SOLUTION INTRAVENOUS at 11:25

## 2024-04-07 RX ADMIN — POTASSIUM CHLORIDE 10 MEQ: 7.46 INJECTION, SOLUTION INTRAVENOUS at 15:35

## 2024-04-07 RX ADMIN — POTASSIUM CHLORIDE 10 MEQ: 7.46 INJECTION, SOLUTION INTRAVENOUS at 12:26

## 2024-04-07 RX ADMIN — HALOPERIDOL LACTATE 2 MG: 5 INJECTION, SOLUTION INTRAMUSCULAR at 13:28

## 2024-04-07 RX ADMIN — SODIUM CHLORIDE: 9 INJECTION, SOLUTION INTRAVENOUS at 01:02

## 2024-04-07 RX ADMIN — POTASSIUM CHLORIDE 10 MEQ: 7.46 INJECTION, SOLUTION INTRAVENOUS at 18:29

## 2024-04-07 RX ADMIN — POTASSIUM CHLORIDE 10 MEQ: 7.46 INJECTION, SOLUTION INTRAVENOUS at 14:08

## 2024-04-07 RX ADMIN — APIXABAN 5 MG: 5 TABLET, FILM COATED ORAL at 09:50

## 2024-04-07 RX ADMIN — MOMETASONE FUROATE AND FORMOTEROL FUMARATE DIHYDRATE 2 PUFF: 200; 5 AEROSOL RESPIRATORY (INHALATION) at 16:50

## 2024-04-07 RX ADMIN — LORAZEPAM 0.5 MG: 2 INJECTION INTRAMUSCULAR; INTRAVENOUS at 16:39

## 2024-04-07 RX ADMIN — SODIUM CHLORIDE: 9 INJECTION, SOLUTION INTRAVENOUS at 21:51

## 2024-04-07 RX ADMIN — OLANZAPINE 5 MG: 5 TABLET, FILM COATED ORAL at 21:46

## 2024-04-07 RX ADMIN — METOPROLOL TARTRATE 25 MG: 25 TABLET, FILM COATED ORAL at 21:47

## 2024-04-07 RX ADMIN — LORAZEPAM 0.5 MG: 2 INJECTION INTRAMUSCULAR; INTRAVENOUS at 01:57

## 2024-04-07 RX ADMIN — POTASSIUM CHLORIDE 10 MEQ: 7.46 INJECTION, SOLUTION INTRAVENOUS at 16:41

## 2024-04-07 RX ADMIN — METOPROLOL TARTRATE 25 MG: 25 TABLET, FILM COATED ORAL at 09:50

## 2024-04-07 RX ADMIN — LORAZEPAM 0.5 MG: 2 INJECTION INTRAMUSCULAR; INTRAVENOUS at 11:05

## 2024-04-07 RX ADMIN — LORAZEPAM 0.5 MG: 2 INJECTION INTRAMUSCULAR; INTRAVENOUS at 01:36

## 2024-04-07 RX ADMIN — HALOPERIDOL LACTATE 2 MG: 5 INJECTION, SOLUTION INTRAMUSCULAR at 01:54

## 2024-04-07 NOTE — ED TRIAGE NOTES
Patient to ED via AFTD due to AMS and hallucinations per family. Per EMS, patient has been having frequent falls. Is supposed to be on eliquis but has not taken this in about 3 weeks. Ems states patient was seeing things in the back of the truck that were not there. Pt lives at home with wife.

## 2024-04-07 NOTE — H&P
Internal Medicine  History and Physical    Patient:  El Leo  MRN: [id number]      History Obtained From:  patient, family member - daughter, Quality of history:  patient a poor historian  PCP: Main Caldwell DO    CHIEF COMPLAINT:  confusion, aggressive behavior, hallucinations, falls at home    HISTORY OF PRESENT ILLNESS:   The patient is a 78 y.o. male who presents with  increasing confusion, aggressive behavior, hallucinations, falls at home.  He was relocated to [place] by daughter last September cos of confusion noted by family. He lost his 's License following an accident.  He has been forgetful and recently more aggressive towards his wife. He has visual hallucinations, unsteady gait with falls and urinary incontinence.   He said he fell twice at [employer] and hit his hand.  Actually, EMS was called by family member and the patient fell at home multiple times recently.  Patient supposed to be taking Eliquis, he has a remote h/o A-fib as reflected by his EKG today showing atrial fibrillation- which is off for the last 3 weeks due to inability to refill the prescription.      Past Medical History:        Diagnosis Date    Asbestosis ([facility])     ASHD (arteriosclerotic heart disease)     Atrial fibrillation ([facility])     COPD (chronic obstructive pulmonary disease) ([facility])     DDD (degenerative disc disease), lumbar     s/p lumbar fusion with geetha placement    DM2 (diabetes mellitus, type 2) ([facility])     Dyslipidemia     GERD (gastroesophageal reflux disease)     H/O sick sinus syndrome     History of CVA (cerebrovascular accident)     History of small, chronic, cerebral infarcts, involving left frontal and parietal lobe cortices and bilateral cerebellar hemispheres, per MRI of the brain on 04/07/2020    History of kidney stones 04/03/2024    Hypertension, essential     Obesity (BMI 30-39.9)     USMAN on CPAP     Osteoarthritis of carpometacarpal (CMC) joint of right thumb     Chronic thumb pain, right, due to  producing mild central canal and  moderate bilateral neural foraminal narrowing.    C7-T1: Normal disc height without evidence for disk protrusions or bulges.  Normal facets.    Incidental Findings:  No significant incidental findings are seen.   Impression:     Degenerative changes within the cervical spine. No fractures.     XR CHEST 1 VIEW [[id number]]    Collected: 04/06/24 2150    Updated: 04/06/24 2252    Narrative:     Single view of the chest    Comparison:  CR/SR - XR CHEST PORTABLE - 03/23/2024 09:01 PM EDT    Findings: Cardiomegaly, no CHF. Stable pleural calcifications. otherwise  normal heart, lungs and mediastinum. No pneumonia.   Impression:     Impression:  Cardiomegaly, no CHF.    Stable pleural calcifications.       Assessment and Plan   AMS  Confusion in the setting of memory loss and urinary incontinence  Prob dementia  HTN  DM 2  Chronic a fib  hypokalemia    Get MRI brain, B!2, folate, TSH  Replace K  Fall precautions.  Resume eliquis   Am lytes.  Start Zyprexa      Patient Active Problem List   Diagnosis Code    Asbestosis ([facility]) J61    ASHD (arteriosclerotic heart disease) I25.10    Atrial fibrillation ([facility]) I48.91    COPD (chronic obstructive pulmonary disease) ([facility]) J44.9    DDD (degenerative disc disease), lumbar M51.36    DM2 (diabetes mellitus, type 2) ([facility]) E11.9    Dyslipidemia E78.5    GERD (gastroesophageal reflux disease) K21.9    H/O sick sinus syndrome Z86.79    History of CVA (cerebrovascular accident) Z86.73    Hypertension, essential I10    Obesity (BMI 30-39.9) E66.9    USMAN on CPAP G47.33    Osteoarthritis of carpometacarpal (CMC) joint of right thumb M18.11    Balance problem R26.89    Chronic vertigo R42    Elevated PSA R97.20    History of kidney stones Z87.442    Delirium R41.0       Mayte Guzman MD, MD  Admitting Internist

## 2024-04-07 NOTE — ED NOTES
Report received from HINA Marcos. Patient resting in bed. Respirations easy and unlabored. No distress noted. Call light within reach. Provider at the bedside updating family on plan of care. Patient currently hallucinating but cooperative.

## 2024-04-07 NOTE — CARE COORDINATION
Case Management Assessment  Initial Evaluation    Date/Time of Evaluation: 4/7/2024 10:35 AM  Assessment Completed by: Thea Jenkins RN    If patient is discharged prior to next notation, then this note serves as note for discharge by case management.    Patient Name: El Leo                   YOB: 1945  Diagnosis: Hypokalemia [E87.6]  Delirium [R41.0]  Chronic atrial fibrillation (HCC) [I48.20]  Frequent falls [R29.6]  Altered mental status, unspecified altered mental status type [R41.82]                   Date / Time: 4/6/2024  9:08 PM  Location: [address] [place] [address]     Patient Admission Status: Inpatient   Readmission Risk Low 0-14, Mod 15-19), High > 20: No data recorded  Current PCP: Main Caldwell, DO  PCP verified by ?      Chart Reviewed: Yes      History Provided by: Child/Family, Medical Record  Patient Orientation: Unable to Assess    Patient Cognition: Severely Impaired    Hospitalization in the last 30 days (Readmission):  No    If yes, Readmission Assessment in  Navigator will be completed.    Advance Directives:      Code Status: Full Code   Patient's Primary Decision Maker is: Legal Next of Kin (Janet thinks she may have health care POA papers. She will search for them.)      Discharge Planning:    Patient lives with: Spouse/Significant Other Type of Home: Apartment  Primary Care Giver: Family  Patient Support Systems include: Spouse/Significant Other, Children   Current Financial resources: Medicare, Other (Comment) (Secondary through [employer])  Current community resources: None  Current services prior to admission: None            Current DME:              Type of Home Care services:  None    ADLS  Prior functional level: Assistance with the following:, Cooking, Housework, Shopping, Mobility  Current functional level: Assistance with the following:, Cooking, Housework, Shopping, Mobility    Family can provide assistance at DC: Yes  Would you like Case Management to discuss  [R41.82]    Patient Goals/Plan/Treatment Preferences: Spoke to Candido's daughter, Janet. She recently moved her mom and dad here from [facility]. Candido saw Dr. Caldwell for the first time this past Wednesday. Janet reports Candido to be verbally aggressive to his wife. He has been falling repeatedly at home. Janet is planning for SNF placement at discharge. She is planning on applying for Medicaid. SS consult placed.   Transportation/Food Security/Housekeeping Addressed: No issues identified.     Thea Jenkins RN  Case Management Department

## 2024-04-07 NOTE — PROGRESS NOTES
1:10am patient refused all the medications. Then he started to argue with his daughter in the room. Patient was shouting loudly in the room. Nurses came in and patient was combative. Called the police on the scene. Attending physician notified (Dr. Guzman). Ordered 2 doses of atavan and haldol IM. Four point soft restrain and live sitter was also ordered. Electronically signed by Seymour Man RN on 4/7/2024 at 2:28 AM     Patient had potassium of 3.2. Patient refused to take medication for replacement. Attending physician notified at 1:13am. Electronically signed by Seymour Man RN on 4/7/2024

## 2024-04-07 NOTE — PROGRESS NOTES
Pt admitted to  5K20 via cart/stretcher.      IV none infusing into the antecubital right, condition patent and no redness. IV site free of s/s of infection or infiltration. Vital signs obtained. Assessment and data collection initiated.     Two nurse skin assessment performed by Ilya SANTAMARIA and Silva SANTAMARIA. Oriented to room.     Explained patients right to have family, representative or physician notified of their admission.      Policies and procedures for 5K explained. All questions answered with no further questions at this time. Fall prevention and safety brochure discussed with patient and significant other.  Bed alarm on. Call light in reach.      Electronically signed by Seymour Man RN on 4/7/2024 at 12:51 AM

## 2024-04-07 NOTE — ED NOTES
ED to inpatient nurses report      Chief Complaint:  Chief Complaint   Patient presents with    Altered Mental Status    Hallucinations     Present to ED from: home with wife      MOA:     LOC: alert to person; disoriented to time, place, situation  Mobility: Requires assistance * 1  Oxygen Baseline: room air    Current needs required: room air     Code Status:   No Order    What abnormal results were found and what did you give/do to treat them? Labs, CT, fluid bolus  Any procedures or intervention occur?     Mental Status:  Level of Consciousness: Alert (0)    Psych Assessment:        Vitals:  Patient Vitals for the past 24 hrs:   BP Temp Temp src Pulse Resp SpO2 Height Weight   04/06/24 2231 (!) 133/100 -- -- 75 16 96 % -- --   04/06/24 2142 (!) 142/98 -- -- 67 18 98 % -- --   04/06/24 2116 130/64 -- -- -- -- -- -- --   04/06/24 2113 (!) 139/121 97.8 °F (36.6 °C) Oral 69 14 97 % 1.778 m (5' 10\") 108.7 kg (239 lb 9.6 oz)        LDAs:   Peripheral IV 04/06/24 Right Antecubital (Active)   Site Assessment Clean, dry & intact 04/06/24 2138   Line Status Normal saline locked;Brisk blood return 04/06/24 2138   Dressing Status New dressing applied 04/06/24 2138   Dressing Type Primapore 04/06/24 2138   Dressing Intervention New 04/06/24 2138       Ambulatory Status:  No data recorded    Diagnosis:  DISPOSITION Admitted 04/06/2024 11:07:46 PM   Final diagnoses:   Altered mental status, unspecified altered mental status type   Delirium   Frequent falls   Chronic atrial fibrillation (HCC)        Consults:  None     Pain Score:  Pain Assessment  Pain Assessment: None - Denies Pain    C-SSRS:        Sepsis Screening:  Sepsis Risk Score: 1.39    Diablo Fall Risk:       Swallow Screening        Preferred Language:   English      ALLERGIES     Patient has no known allergies.    SURGICAL HISTORY       Past Surgical History:   Procedure Laterality Date    CALCANEAL OSTEOTOMY Bilateral     CARPAL TUNNEL RELEASE Right     CATARACT  Mild increase in A1c at 7.9%.  She does continue to follow closely with Endocrinology.  Numbers are statically satisfactory however I would still like to see her A1c closer to seven.  So continue current medications and follow with Endocrinology.  Kidney function remains satisfactory to continue metformin at this time.  Insulin is managed by Endocrinology. 2 episodes of hypo when late for lunch.    Health maintenance due:  Eye exam.  Referral has been entered.  Has not yet been scheduled.  Will encourage her to do so.

## 2024-04-07 NOTE — PLAN OF CARE
Problem: Discharge Planning  Goal: Discharge to home or other facility with appropriate resources  Outcome: Progressing   SW consult received. See SW note 4/7/24.

## 2024-04-07 NOTE — PROGRESS NOTES
Attempted to take patient's bilateral ankle restraints off. Patient kicked Lay CARDONA. Patient given medication for agitation and bilateral ankle restraints put back on.

## 2024-04-07 NOTE — PLAN OF CARE
Problem: Skin/Tissue Integrity  Goal: Absence of new skin breakdown  Description: 1.  Monitor for areas of redness and/or skin breakdown  2.  Assess vascular access sites hourly  3.  Every 4-6 hours minimum:  Change oxygen saturation probe site  4.  Every 4-6 hours:  If on nasal continuous positive airway pressure, respiratory therapy assess nares and determine need for appliance change or resting period.  Outcome: Progressing     Problem: ABCDS Injury Assessment  Goal: Absence of physical injury  Outcome: Progressing  Flowsheets (Taken 4/7/2024 1747)  Absence of Physical Injury: Implement safety measures based on patient assessment     Problem: Safety - Adult  Goal: Free from fall injury  Outcome: Progressing  Flowsheets (Taken 4/7/2024 1747)  Free From Fall Injury:   Instruct family/caregiver on patient safety   Based on caregiver fall risk screen, instruct family/caregiver to ask for assistance with transferring infant if caregiver noted to have fall risk factors     Problem: Safety - Medical Restraint  Goal: Remains free of injury from restraints (Restraint for Interference with Medical Device)  Description: INTERVENTIONS:  1. Determine that other, less restrictive measures have been tried or would not be effective before applying the restraint  2. Evaluate the patient's condition at the time of restraint application  3. Inform patient/family regarding the reason for restraint  4. Q2H: Monitor safety, psychosocial status, comfort, nutrition and hydration  Outcome: Progressing  Flowsheets (Taken 4/7/2024 1747)  Remains free of injury from restraints (restraint for interference with medical device):   Every 2 hours: Monitor safety, psychosocial status, comfort, nutrition and hydration   Inform patient/family regarding the reason for restraint   Evaluate the patient's condition at the time of restraint application   Determine that other, less restrictive measures have been tried or would not be effective before

## 2024-04-07 NOTE — PROGRESS NOTES
Patient is continually trying to get out of bed, grabbing at anything in reach, pulling the the restraints taut. Sitter at bedside to maintain patient safety.

## 2024-04-07 NOTE — PLAN OF CARE
Problem: Skin/Tissue Integrity  Goal: Absence of new skin breakdown  Description: 1.  Monitor for areas of redness and/or skin breakdown  2.  Assess vascular access sites hourly  3.  Every 4-6 hours minimum:  Change oxygen saturation probe site  4.  Every 4-6 hours:  If on nasal continuous positive airway pressure, respiratory therapy assess nares and determine need for appliance change or resting period.  Outcome: Progressing     Problem: ABCDS Injury Assessment  Goal: Absence of physical injury  Outcome: Progressing     Problem: Safety - Adult  Goal: Free from fall injury  Outcome: Progressing     Problem: Safety - Medical Restraint  Goal: Remains free of injury from restraints (Restraint for Interference with Medical Device)  Description: INTERVENTIONS:  1. Determine that other, less restrictive measures have been tried or would not be effective before applying the restraint  2. Evaluate the patient's condition at the time of restraint application  3. Inform patient/family regarding the reason for restraint  4. Q2H: Monitor safety, psychosocial status, comfort, nutrition and hydration  Outcome: Progressing     Care plan reviewed with patient.  Patient verbalize understanding of the plan of care and contribute to goal setting.

## 2024-04-07 NOTE — CARE COORDINATION
DISCHARGE PLANNING EVALUATION  4/7/24, 1:36 PM EDT    Reason for Referral: SNF placement at discharge  Mental Status: pt sleeping during SW visit, sitter and daughter at bedside  Decision Making: wife  Family/Social/Home Environment: SW met with pt's daughter Janet at bedside. Janet reports she had moved pt and pt's wife in with her back in September.   Current Services including food security, transportation and housekeeping: no current services  Current Equipment:none  Payment Source:Medicare and [employer]  Concerns or Barriers to Discharge: sitter and restraints in place  Post-acute (PAC) provider list was provided to patient. Patient was informed of their freedom to choose PAC provider. Discussed and offered to show the patient the relevant PAC Providers quality and resource use measures on Medicare Compare web site via computer based on patient's goals of care and treatment preferences. Questions regarding selection process were answered.      Teach Back Method used with Janet regarding care plan and needs  Daughter Janet verbalized understanding of the plan of care and contribute to goal setting.       Patient goals, treatment preferences and discharge plan: Probable new SNF placement. Daughter brought in POA and living will paperwork, SW did make copy and placed on chart. Daughter reports they will need to apply for Medicaid. SW did discuss with daughter they can apply for this online, provided information for this. TONY also left a message with Neda with public benefits to help Medicaid process.     Electronically signed by MARIA LUISA Rodriguez on 4/7/2024 at 1:36 PM

## 2024-04-07 NOTE — PROGRESS NOTES
Restraint type: Soft restraint:  right ankle, right wrist, left ankle, and left wrist  Reason for restraints: Poor safety judgment:  History of frequent falls with failure to remember to ask for assistance, Pulling out devices:  Unable to follow directions/instructions, and agitation, restless, aggressive behaviors.  Duration: 24 hours    Restraints must be removed when an alternative is available and effective and/or patient no longer meets criteria.    Orders must be renewed every calendar day or when discontinued.    The MD must conduct a face to face assessment within 1 calendar day of initiation when initial restraint order is verbal.

## 2024-04-07 NOTE — ED PROVIDER NOTES
[facility]  EMERGENCY DEPARTMENT ENCOUNTER        PATIENT NAME: El Leo  MRN: [id number]  : 1945  ALDRIDGE: 2024  PROVIDER: Jude Naranjo MD    Patient was seen and evaluated at 9:24 PM EDT. Nurses Notes are reviewed and I agree except as noted in the HPI.    HISTORY OF PRESENT ILLNESS   El Leo is a 78 y.o. male who presents to Emergency Department with Altered Mental Status and Hallucinations       Patient brought in by EMS for frequent falls.  He also has visual hallucination.  He is poor historian, no family at bedside.  He said he fell twice at [employer] and hit his hand.  Actually, EMS was called by family member and the patient fell at home multiple times recently.  Patient supposed to be taking Eliquis (presumed to be due to A-fib as reflected by his EKG today showing atrial fibrillation) which is off for the last 3 weeks due to inability to refill the prescription.  Patient is not complaining of pain.  He has no focal weakness.  No fever or chills.  No nausea or vomiting.  He has PMH is reviewed as below.    This limited HPI is provided by EMS and medical record review.     PAST MEDICAL HISTORY    has a past medical history of Asbestosis ([facility]), ASHD (arteriosclerotic heart disease), Atrial fibrillation ([facility]), COPD (chronic obstructive pulmonary disease) ([facility]), DDD (degenerative disc disease), lumbar, DM2 (diabetes mellitus, type 2) ([facility]), Dyslipidemia, GERD (gastroesophageal reflux disease), H/O sick sinus syndrome, History of CVA (cerebrovascular accident), History of kidney stones, Hypertension, essential, Obesity (BMI 30-39.9), USMAN on CPAP, and Osteoarthritis of carpometacarpal (CMC) joint of right thumb.    SURGICAL HISTORY      has a past surgical history that includes Cholecystectomy; Lumbar spine surgery; Cataract removal; ventral hernia repair; Lithotripsy; Wrist surgery; Tonsillectomy; Calcaneal osteotomy (Bilateral); ERCP (2019); and Carpal tunnel release  1.7 1.6 - 2.4 mg/dL   TSH with Reflex   Result Value Ref Range    TSH 0.526 0.400 - 4.200 uIU/mL   Anion Gap   Result Value Ref Range    Anion Gap 13.0 8.0 - 16.0 meq/L   Osmolality   Result Value Ref Range    Osmolality Calc 283.1 275.0 - 300.0 mOsmol/kg   Glomerular Filtration Rate, Estimated   Result Value Ref Range    Est, Glom Filt Rate 86 >60 ml/min/1.73m2       (Any cultures that may have been sent were not resulted at the time of this patient visit)    MEDICAL DECISION MAKING (MDM) AND ED COURSE     9:24 PM EDT: Patient was seen and evaluated.   DDx include but not limited to: Delirium, dehydration, UTI, pneumonia, ACS, CHF, CVA, TIA  Initial plan: Large-bore IV, EKG, basic labs, chest x-ray, CT head, CT cervical spine and plan to admit    11:07 PM  Stable on reassessment.     Patient's history and physical exam suggest acute delirium.  ED workups are reassuring, no clear etiology for his delirium.  Patient has chronic atrial fibrillation, was off Eliquis for 3 weeks, acute minor brain infarct is a consideration.  But patient has no focal neurological deficits.  I feel admission for a brain MRI and PT/OT evaluation and treatment are necessary.  Discussed with and admitted by Dr. Guzman.     ED MEDICATIONS:  (None if blank)  Medications   0.9 % sodium chloride infusion ( IntraVENous New Bag 4/6/24 2140)   apixaban (ELIQUIS) tablet 5 mg (has no administration in time range)     CONSULTANTS:  Dr. Guzman    PROCEDURES:   None    CRITICAL CARE:   None    Vitals:    04/06/24 2116 04/06/24 2142 04/06/24 2231 04/06/24 2325   BP: 130/64 (!) 142/98 (!) 133/100 95/83   Pulse:  67 75 73   Resp:  18 16 16   Temp:       TempSrc:       SpO2:  98% 96% 96%   Weight:       Height:           NUMBER AND COMPLEXITY OF PROBLEMS        Problem List This Visit: Fall, confusion, delirium, chronic A-fib    Pertinent Comorbid Conditions:  See HPI, PMH and PSH    DATA REVIEWED(none if left blank)    Code Status:  Reviewed with patient and/or

## 2024-04-08 ENCOUNTER — APPOINTMENT (OUTPATIENT)
Dept: MRI IMAGING | Age: 79
End: 2024-04-08
Payer: MEDICARE

## 2024-04-08 LAB
ANION GAP SERPL CALC-SCNC: 11 MEQ/L (ref 8–16)
BASOPHILS ABSOLUTE: 0.1 THOU/MM3 (ref 0–0.1)
BASOPHILS NFR BLD AUTO: 1.1 %
BUN SERPL-MCNC: 10 MG/DL (ref 7–22)
CALCIUM SERPL-MCNC: 8.5 MG/DL (ref 8.5–10.5)
CHLORIDE SERPL-SCNC: 104 MEQ/L (ref 98–111)
CO2 SERPL-SCNC: 26 MEQ/L (ref 23–33)
CREAT SERPL-MCNC: 0.8 MG/DL (ref 0.4–1.2)
DEPRECATED MEAN GLUCOSE BLD GHB EST-ACNC: 180 MG/DL (ref 70–126)
DEPRECATED RDW RBC AUTO: 46.4 FL (ref 35–45)
EKG Q-T INTERVAL: 442 MS
EKG QRS DURATION: 152 MS
EKG QTC CALCULATION (BAZETT): 493 MS
EKG R AXIS: -79 DEGREES
EKG T AXIS: 24 DEGREES
EKG VENTRICULAR RATE: 75 BPM
EOSINOPHIL NFR BLD AUTO: 2.6 %
EOSINOPHILS ABSOLUTE: 0.3 THOU/MM3 (ref 0–0.4)
ERYTHROCYTE [DISTWIDTH] IN BLOOD BY AUTOMATED COUNT: 13.6 % (ref 11.5–14.5)
FOLATE SERPL-MCNC: 14.3 NG/ML (ref 4.8–24.2)
GFR SERPL CREATININE-BSD FRML MDRD: 89 ML/MIN/1.73M2
GLUCOSE BLD STRIP.AUTO-MCNC: 105 MG/DL (ref 70–108)
GLUCOSE BLD STRIP.AUTO-MCNC: 171 MG/DL (ref 70–108)
GLUCOSE BLD STRIP.AUTO-MCNC: 207 MG/DL (ref 70–108)
GLUCOSE BLD STRIP.AUTO-MCNC: 237 MG/DL (ref 70–108)
GLUCOSE BLD STRIP.AUTO-MCNC: 59 MG/DL (ref 70–108)
GLUCOSE SERPL-MCNC: 60 MG/DL (ref 70–108)
HBA1C MFR BLD HPLC: 8 % (ref 4.4–6.4)
HCT VFR BLD AUTO: 41 % (ref 42–52)
HGB BLD-MCNC: 13.6 GM/DL (ref 14–18)
IMM GRANULOCYTES # BLD AUTO: 0.02 THOU/MM3 (ref 0–0.07)
IMM GRANULOCYTES NFR BLD AUTO: 0.2 %
LYMPHOCYTES ABSOLUTE: 3.9 THOU/MM3 (ref 1–4.8)
LYMPHOCYTES NFR BLD AUTO: 39.9 %
MAGNESIUM SERPL-MCNC: 1.5 MG/DL (ref 1.6–2.4)
MAGNESIUM SERPL-MCNC: 2.1 MG/DL (ref 1.6–2.4)
MCH RBC QN AUTO: 31.2 PG (ref 26–33)
MCHC RBC AUTO-ENTMCNC: 33.2 GM/DL (ref 32.2–35.5)
MCV RBC AUTO: 94 FL (ref 80–94)
MONOCYTES ABSOLUTE: 1.1 THOU/MM3 (ref 0.4–1.3)
MONOCYTES NFR BLD AUTO: 11.1 %
NEUTROPHILS NFR BLD AUTO: 45.1 %
NRBC BLD AUTO-RTO: 0 /100 WBC
PLATELET # BLD AUTO: 253 THOU/MM3 (ref 130–400)
PMV BLD AUTO: 9.5 FL (ref 9.4–12.4)
POTASSIUM SERPL-SCNC: 3 MEQ/L (ref 3.5–5.2)
POTASSIUM SERPL-SCNC: 3 MEQ/L (ref 3.5–5.2)
RBC # BLD AUTO: 4.36 MILL/MM3 (ref 4.7–6.1)
SEGMENTED NEUTROPHILS ABSOLUTE COUNT: 4.4 THOU/MM3 (ref 1.8–7.7)
SODIUM SERPL-SCNC: 141 MEQ/L (ref 135–145)
TSH SERPL DL<=0.005 MIU/L-ACNC: 1.3 UIU/ML (ref 0.4–4.2)
VIT B12 SERPL-MCNC: 538 PG/ML (ref 211–911)
WBC # BLD AUTO: 9.7 THOU/MM3 (ref 4.8–10.8)

## 2024-04-08 PROCEDURE — 82746 ASSAY OF FOLIC ACID SERUM: CPT

## 2024-04-08 PROCEDURE — 6360000002 HC RX W HCPCS: Performed by: INTERNAL MEDICINE

## 2024-04-08 PROCEDURE — 80048 BASIC METABOLIC PNL TOTAL CA: CPT

## 2024-04-08 PROCEDURE — 94761 N-INVAS EAR/PLS OXIMETRY MLT: CPT

## 2024-04-08 PROCEDURE — 83735 ASSAY OF MAGNESIUM: CPT

## 2024-04-08 PROCEDURE — 1200000003 HC TELEMETRY R&B

## 2024-04-08 PROCEDURE — 85025 COMPLETE CBC W/AUTO DIFF WBC: CPT

## 2024-04-08 PROCEDURE — 82607 VITAMIN B-12: CPT

## 2024-04-08 PROCEDURE — 84443 ASSAY THYROID STIM HORMONE: CPT

## 2024-04-08 PROCEDURE — 94640 AIRWAY INHALATION TREATMENT: CPT

## 2024-04-08 PROCEDURE — 36415 COLL VENOUS BLD VENIPUNCTURE: CPT

## 2024-04-08 PROCEDURE — 93010 ELECTROCARDIOGRAM REPORT: CPT | Performed by: NUCLEAR MEDICINE

## 2024-04-08 PROCEDURE — 83036 HEMOGLOBIN GLYCOSYLATED A1C: CPT

## 2024-04-08 PROCEDURE — 6370000000 HC RX 637 (ALT 250 FOR IP): Performed by: INTERNAL MEDICINE

## 2024-04-08 PROCEDURE — 93005 ELECTROCARDIOGRAM TRACING: CPT | Performed by: INTERNAL MEDICINE

## 2024-04-08 PROCEDURE — 82948 REAGENT STRIP/BLOOD GLUCOSE: CPT

## 2024-04-08 PROCEDURE — 2580000003 HC RX 258: Performed by: INTERNAL MEDICINE

## 2024-04-08 PROCEDURE — 70551 MRI BRAIN STEM W/O DYE: CPT

## 2024-04-08 RX ORDER — INSULIN GLARGINE 100 [IU]/ML
10 INJECTION, SOLUTION SUBCUTANEOUS NIGHTLY
Status: DISCONTINUED | OUTPATIENT
Start: 2024-04-08 | End: 2024-04-10

## 2024-04-08 RX ADMIN — METOPROLOL TARTRATE 25 MG: 25 TABLET, FILM COATED ORAL at 20:45

## 2024-04-08 RX ADMIN — MOMETASONE FUROATE AND FORMOTEROL FUMARATE DIHYDRATE 2 PUFF: 200; 5 AEROSOL RESPIRATORY (INHALATION) at 08:48

## 2024-04-08 RX ADMIN — MAGNESIUM SULFATE HEPTAHYDRATE 2000 MG: 40 INJECTION, SOLUTION INTRAVENOUS at 04:41

## 2024-04-08 RX ADMIN — POTASSIUM CHLORIDE 10 MEQ: 7.46 INJECTION, SOLUTION INTRAVENOUS at 06:49

## 2024-04-08 RX ADMIN — SODIUM CHLORIDE, PRESERVATIVE FREE 10 ML: 5 INJECTION INTRAVENOUS at 08:35

## 2024-04-08 RX ADMIN — SODIUM CHLORIDE: 9 INJECTION, SOLUTION INTRAVENOUS at 18:33

## 2024-04-08 RX ADMIN — Medication 250 MCG: at 08:35

## 2024-04-08 RX ADMIN — POTASSIUM CHLORIDE 10 MEQ: 7.46 INJECTION, SOLUTION INTRAVENOUS at 11:50

## 2024-04-08 RX ADMIN — MOMETASONE FUROATE AND FORMOTEROL FUMARATE DIHYDRATE 2 PUFF: 200; 5 AEROSOL RESPIRATORY (INHALATION) at 16:34

## 2024-04-08 RX ADMIN — APIXABAN 5 MG: 5 TABLET, FILM COATED ORAL at 20:22

## 2024-04-08 RX ADMIN — METOPROLOL TARTRATE 25 MG: 25 TABLET, FILM COATED ORAL at 08:35

## 2024-04-08 RX ADMIN — INSULIN GLARGINE 10 UNITS: 100 INJECTION, SOLUTION SUBCUTANEOUS at 20:22

## 2024-04-08 RX ADMIN — METFORMIN HYDROCHLORIDE 500 MG: 500 TABLET ORAL at 17:56

## 2024-04-08 RX ADMIN — LISINOPRIL 20 MG: 20 TABLET ORAL at 08:36

## 2024-04-08 RX ADMIN — Medication 250 MG: at 08:35

## 2024-04-08 RX ADMIN — SODIUM CHLORIDE: 9 INJECTION, SOLUTION INTRAVENOUS at 08:19

## 2024-04-08 RX ADMIN — APIXABAN 5 MG: 5 TABLET, FILM COATED ORAL at 08:36

## 2024-04-08 RX ADMIN — OLANZAPINE 5 MG: 5 TABLET, FILM COATED ORAL at 20:45

## 2024-04-08 RX ADMIN — LORAZEPAM 0.5 MG: 2 INJECTION INTRAMUSCULAR; INTRAVENOUS at 02:40

## 2024-04-08 RX ADMIN — ATORVASTATIN CALCIUM 80 MG: 80 TABLET, FILM COATED ORAL at 20:22

## 2024-04-08 RX ADMIN — FERROUS SULFATE TAB 325 MG (65 MG ELEMENTAL FE) 325 MG: 325 (65 FE) TAB at 08:35

## 2024-04-08 ASSESSMENT — PAIN SCALES - GENERAL: PAINLEVEL_OUTOF10: 0

## 2024-04-08 NOTE — PROGRESS NOTES
Comprehensive Nutrition Assessment    Type and Reason for Visit:  Initial, Positive Nutrition Screen, Wound    Nutrition Recommendations/Plan:   Consider MVI to assist in wound healing efforts.  ONS initiated: Camilo BID and Glucerna daily.  Continue current diet.  When appetite improves consider carb control diet to assist in blood sugar management.   Note planning ECF at discharge.      Malnutrition Assessment:  Malnutrition Status:  At risk for malnutrition (Comment) (04/08/24 1327)    Context:  Chronic Illness     Findings of the 6 clinical characteristics of malnutrition:  Energy Intake:   (no significant energy decrease prior to admit per patient, mild decrease in energy intake since admit)  Weight Loss:   (intentional weight loss per patient, note weight down 11.6% in the last 1 year)     Body Fat Loss:  No significant body fat loss     Muscle Mass Loss:  No significant muscle mass loss    Fluid Accumulation:  No significant fluid accumulation     Strength:  Not Performed    Nutrition Assessment:     Pt. nutritionally compromised AEB wounds.  At risk for further nutrition compromise r/t increased nutrient needs for wound healing, slight decline in appetite/intake since admit, admit with hypokalemia, increased confusion-Provider suspecting dementia, aggressive behavior, falls at home, and underlying medical condition (hx: COPD, DM (HgbA1c 8% 4/8/24), HLD, HTN, CVA, afib, GERD, kidney stones, lumbar fusion).       Nutrition Related Findings:    Pt. Report/Treatments/Miscellaneous: Patient seen earlier this morning, he was eating breakfast, using his hands but had most of breakfast ate.  Telesitter present in room.  States he was in the hospital when asked.  Reports intentional weight loss prior to admit but \"changing my diet around\", states he eats 3 meals a day and snacks on nuts, reports he has been falling \"due to vertigo\".  Reports he checks his blood sugars in the morning and night daily.    GI Status:  Signs/Symptoms Outcomes: Biochemical Data, GI Status, Fluid Status or Edema, Nutrition Focused Physical Findings, Skin, Weight    Discharge Planning:    Too soon to determine     Allison C Schwab, RD, LD  [phone]

## 2024-04-08 NOTE — PROGRESS NOTES
INTERNAL MEDICINE Progress Note  4/8/2024 11:59 AM  Subjective:   Admit Date: 4/6/2024  PCP: aMin Caldwell,   Interval History:     Calmer today, off wrist restraints  Alert and oriented to person, year    Objective:   Vitals: /83   Pulse 66   Temp 97.3 °F (36.3 °C) (Axillary)   Resp 18   Ht 1.778 m (5' 10\")   Wt 108.7 kg (239 lb 9.6 oz)   SpO2 93%   BMI 34.38 kg/m²   General appearance: alert and cooperative with exam  HEENT: Head: atraumatic  Neck: no adenopathy, no carotid bruit, no JVD, and supple, symmetrical, trachea midline  Lungs: clear to auscultation bilaterally  Heart: regular rate and rhythm and S1, S2 normal  Abdomen: soft, non-tender; bowel sounds normal; no masses,  no organomegaly  Extremities: extremities normal, atraumatic, no cyanosis or edema  Neurologic: Mental status: Alert, oriented, thought content appropriate      Medications:   Scheduled Meds:   sodium chloride flush  5-40 mL IntraVENous 2 times per day    metoprolol tartrate  25 mg Oral BID    apixaban  5 mg Oral BID    insulin lispro  0-4 Units SubCUTAneous TID WC    insulin lispro  0-4 Units SubCUTAneous Nightly    OLANZapine  5 mg Oral Nightly    atorvastatin  80 mg Oral Nightly    mometasone-formoterol  2 puff Inhalation BID RT    cyanocobalamin  250 mcg Oral Daily    lisinopril  20 mg Oral Daily    magnesium oxide  250 mg Oral Daily    ferrous sulfate  325 mg Oral Daily with breakfast    potassium bicarb-citric acid  40 mEq Oral Once     Continuous Infusions:   sodium chloride      sodium chloride 75 mL/hr at 04/08/24 0819    dextrose         Lab Results:   CBC:   Recent Labs     04/06/24 2141 04/07/24  0933 04/08/24  0332   WBC 9.2 11.4* 9.7   HGB 14.7 13.4* 13.6*    248 253     BMP:    Recent Labs     04/06/24 2141 04/07/24  0933 04/07/24 2144 04/08/24  0332    144  --  141   K 3.2* 2.9* 3.2* 3.0*  3.0*    105  --  104   CO2 28 25  --  26   BUN 16 15  --  10   CREATININE 0.9 0.8  --  0.8

## 2024-04-08 NOTE — PLAN OF CARE
Problem: Skin/Tissue Integrity  Goal: Absence of new skin breakdown  Description: 1.  Monitor for areas of redness and/or skin breakdown  2.  Assess vascular access sites hourly  3.  Every 4-6 hours minimum:  Change oxygen saturation probe site  4.  Every 4-6 hours:  If on nasal continuous positive airway pressure, respiratory therapy assess nares and determine need for appliance change or resting period.  Outcome: Progressing     Problem: ABCDS Injury Assessment  Goal: Absence of physical injury  Outcome: Progressing     Problem: Safety - Adult  Goal: Free from fall injury  Outcome: Progressing     Problem: Safety - Medical Restraint  Goal: Remains free of injury from restraints (Restraint for Interference with Medical Device)  Description: INTERVENTIONS:  1. Determine that other, less restrictive measures have been tried or would not be effective before applying the restraint  2. Evaluate the patient's condition at the time of restraint application  3. Inform patient/family regarding the reason for restraint  4. Q2H: Monitor safety, psychosocial status, comfort, nutrition and hydration  Outcome: Progressing     Problem: Chronic Conditions and Co-morbidities  Goal: Patient's chronic conditions and co-morbidity symptoms are monitored and maintained or improved  Outcome: Progressing     Problem: Discharge Planning  Goal: Discharge to home or other facility with appropriate resources  Outcome: Progressing     Problem: Nutrition Deficit:  Goal: Optimize nutritional status  Outcome: Progressing

## 2024-04-08 NOTE — PLAN OF CARE
Problem: Skin/Tissue Integrity  Goal: Absence of new skin breakdown  Description: 1.  Monitor for areas of redness and/or skin breakdown  2.  Assess vascular access sites hourly  3.  Every 4-6 hours minimum:  Change oxygen saturation probe site  4.  Every 4-6 hours:  If on nasal continuous positive airway pressure, respiratory therapy assess nares and determine need for appliance change or resting period.  4/7/2024 2319 by Arsenio Pena, RN  Outcome: Progressing  4/7/2024 1747 by Stefano Ojeda RN  Outcome: Progressing     Problem: ABCDS Injury Assessment  Goal: Absence of physical injury  4/7/2024 2319 by Arsenio Pena RN  Outcome: Progressing  Flowsheets (Taken 4/7/2024 2319)  Absence of Physical Injury: Implement safety measures based on patient assessment  4/7/2024 1747 by Stefano Ojeda, RN  Outcome: Progressing  Flowsheets (Taken 4/7/2024 1747)  Absence of Physical Injury: Implement safety measures based on patient assessment     Problem: Safety - Adult  Goal: Free from fall injury  4/7/2024 2319 by Arsenio Pena RN  Outcome: Progressing  Flowsheets (Taken 4/7/2024 2319)  Free From Fall Injury: Instruct family/caregiver on patient safety  4/7/2024 1747 by Stefano Ojeda RN  Outcome: Progressing  Flowsheets (Taken 4/7/2024 1747)  Free From Fall Injury:   Instruct family/caregiver on patient safety   Based on caregiver fall risk screen, instruct family/caregiver to ask for assistance with transferring infant if caregiver noted to have fall risk factors     Problem: Safety - Medical Restraint  Goal: Remains free of injury from restraints (Restraint for Interference with Medical Device)  Description: INTERVENTIONS:  1. Determine that other, less restrictive measures have been tried or would not be effective before applying the restraint  2. Evaluate the patient's condition at the time of restraint application  3. Inform patient/family regarding the reason for restraint  4. Q2H: Monitor safety,

## 2024-04-09 LAB
ANION GAP SERPL CALC-SCNC: 12 MEQ/L (ref 8–16)
BUN SERPL-MCNC: 9 MG/DL (ref 7–22)
CALCIUM SERPL-MCNC: 8.5 MG/DL (ref 8.5–10.5)
CHLORIDE SERPL-SCNC: 105 MEQ/L (ref 98–111)
CO2 SERPL-SCNC: 25 MEQ/L (ref 23–33)
CREAT SERPL-MCNC: 0.8 MG/DL (ref 0.4–1.2)
GFR SERPL CREATININE-BSD FRML MDRD: 89 ML/MIN/1.73M2
GLUCOSE BLD STRIP.AUTO-MCNC: 161 MG/DL (ref 70–108)
GLUCOSE BLD STRIP.AUTO-MCNC: 221 MG/DL (ref 70–108)
GLUCOSE BLD STRIP.AUTO-MCNC: 90 MG/DL (ref 70–108)
GLUCOSE BLD STRIP.AUTO-MCNC: 94 MG/DL (ref 70–108)
GLUCOSE SERPL-MCNC: 100 MG/DL (ref 70–108)
MAGNESIUM SERPL-MCNC: 1.9 MG/DL (ref 1.6–2.4)
POTASSIUM SERPL-SCNC: 3.5 MEQ/L (ref 3.5–5.2)
SODIUM SERPL-SCNC: 142 MEQ/L (ref 135–145)

## 2024-04-09 PROCEDURE — 94640 AIRWAY INHALATION TREATMENT: CPT

## 2024-04-09 PROCEDURE — 6370000000 HC RX 637 (ALT 250 FOR IP): Performed by: INTERNAL MEDICINE

## 2024-04-09 PROCEDURE — 6360000002 HC RX W HCPCS: Performed by: INTERNAL MEDICINE

## 2024-04-09 PROCEDURE — 83735 ASSAY OF MAGNESIUM: CPT

## 2024-04-09 PROCEDURE — 36415 COLL VENOUS BLD VENIPUNCTURE: CPT

## 2024-04-09 PROCEDURE — 1200000003 HC TELEMETRY R&B

## 2024-04-09 PROCEDURE — 82948 REAGENT STRIP/BLOOD GLUCOSE: CPT

## 2024-04-09 PROCEDURE — 2580000003 HC RX 258: Performed by: INTERNAL MEDICINE

## 2024-04-09 PROCEDURE — 97166 OT EVAL MOD COMPLEX 45 MIN: CPT

## 2024-04-09 PROCEDURE — 80048 BASIC METABOLIC PNL TOTAL CA: CPT

## 2024-04-09 RX ORDER — DONEPEZIL HYDROCHLORIDE 5 MG/1
5 TABLET, FILM COATED ORAL NIGHTLY
Status: DISCONTINUED | OUTPATIENT
Start: 2024-04-09 | End: 2024-04-18 | Stop reason: HOSPADM

## 2024-04-09 RX ORDER — LORAZEPAM 2 MG/ML
0.5 INJECTION INTRAMUSCULAR ONCE
Status: COMPLETED | OUTPATIENT
Start: 2024-04-09 | End: 2024-04-09

## 2024-04-09 RX ADMIN — MOMETASONE FUROATE AND FORMOTEROL FUMARATE DIHYDRATE 2 PUFF: 200; 5 AEROSOL RESPIRATORY (INHALATION) at 18:06

## 2024-04-09 RX ADMIN — APIXABAN 5 MG: 5 TABLET, FILM COATED ORAL at 20:30

## 2024-04-09 RX ADMIN — Medication 250 MCG: at 11:42

## 2024-04-09 RX ADMIN — APIXABAN 5 MG: 5 TABLET, FILM COATED ORAL at 11:42

## 2024-04-09 RX ADMIN — INSULIN GLARGINE 10 UNITS: 100 INJECTION, SOLUTION SUBCUTANEOUS at 20:31

## 2024-04-09 RX ADMIN — SODIUM CHLORIDE, PRESERVATIVE FREE 10 ML: 5 INJECTION INTRAVENOUS at 20:30

## 2024-04-09 RX ADMIN — OLANZAPINE 7.5 MG: 5 TABLET, FILM COATED ORAL at 20:30

## 2024-04-09 RX ADMIN — LISINOPRIL 20 MG: 20 TABLET ORAL at 11:42

## 2024-04-09 RX ADMIN — LORAZEPAM 0.5 MG: 2 INJECTION INTRAMUSCULAR; INTRAVENOUS at 00:05

## 2024-04-09 RX ADMIN — ATORVASTATIN CALCIUM 80 MG: 80 TABLET, FILM COATED ORAL at 20:30

## 2024-04-09 RX ADMIN — METOPROLOL TARTRATE 25 MG: 25 TABLET, FILM COATED ORAL at 20:30

## 2024-04-09 RX ADMIN — SODIUM CHLORIDE, PRESERVATIVE FREE 10 ML: 5 INJECTION INTRAVENOUS at 11:44

## 2024-04-09 RX ADMIN — Medication 250 MG: at 11:42

## 2024-04-09 RX ADMIN — DONEPEZIL HYDROCHLORIDE 5 MG: 5 TABLET, FILM COATED ORAL at 20:30

## 2024-04-09 RX ADMIN — MOMETASONE FUROATE AND FORMOTEROL FUMARATE DIHYDRATE 2 PUFF: 200; 5 AEROSOL RESPIRATORY (INHALATION) at 07:42

## 2024-04-09 RX ADMIN — HALOPERIDOL LACTATE 2 MG: 5 INJECTION, SOLUTION INTRAMUSCULAR at 01:10

## 2024-04-09 RX ADMIN — LORAZEPAM 0.5 MG: 2 INJECTION INTRAMUSCULAR; INTRAVENOUS at 01:46

## 2024-04-09 RX ADMIN — FERROUS SULFATE TAB 325 MG (65 MG ELEMENTAL FE) 325 MG: 325 (65 FE) TAB at 11:43

## 2024-04-09 RX ADMIN — METOPROLOL TARTRATE 25 MG: 25 TABLET, FILM COATED ORAL at 11:42

## 2024-04-09 NOTE — PLAN OF CARE
Problem: Chronic Conditions and Co-morbidities  Goal: Patient's chronic conditions and co-morbidity symptoms are monitored and maintained or improved  Outcome: Progressing  Flowsheets (Taken 4/8/2024 2015)  Care Plan - Patient's Chronic Conditions and Co-Morbidity Symptoms are Monitored and Maintained or Improved: Monitor and assess patient's chronic conditions and comorbid symptoms for stability, deterioration, or improvement     Problem: Safety - Adult  Goal: Free from fall injury  Outcome: Progressing     Problem: Discharge Planning  Goal: Discharge to home or other facility with appropriate resources  Outcome: Progressing  Flowsheets (Taken 4/8/2024 2015)  Discharge to home or other facility with appropriate resources: Identify barriers to discharge with patient and caregiver     Problem: Confusion  Goal: Confusion, delirium, dementia, or psychosis is improved or at baseline  Description: INTERVENTIONS:  1. Assess for possible contributors to thought disturbance, including medications, impaired vision or hearing, underlying metabolic abnormalities, dehydration, psychiatric diagnoses, and notify attending LIP  2. [place] high risk fall precautions, as indicated  3. Provide frequent short contacts to provide reality reorientation, refocusing and direction  4. Decrease environmental stimuli, including noise as appropriate  5. Monitor and intervene to maintain adequate nutrition, hydration, elimination, sleep and activity  Outcome: Progressing     Care plan reviewed with patient.  Patient verbalize understanding of the plan of care and contribute to goal setting.

## 2024-04-09 NOTE — PROGRESS NOTES
INTERNAL MEDICINE Progress Note  4/9/2024 6:36 PM  Subjective:   Admit Date: 4/6/2024  PCP: Main Caldwell, DO  Interval History:     Calmer today, off  restraints  Alert and oriented to person, year    Objective:   Vitals: /69   Pulse 84   Temp 98.5 °F (36.9 °C) (Oral)   Resp 16   Ht 1.778 m (5' 10\")   Wt 108.7 kg (239 lb 9.6 oz)   SpO2 94%   BMI 34.38 kg/m²   General appearance: alert and cooperative with exam  HEENT: atraumatic  Neck: no adenopathy, no carotid bruit, no JVD, and supple, symmetrical, trachea midline  Lungs: clear to auscultation bilaterally  Heart: regular rate and rhythm and S1, S2 normal  Abdomen: soft, non-tender; bowel sounds normal; no masses,  no organomegaly  Extremities: extremities normal, atraumatic, no cyanosis or edema  Neurologic: Alert, oriented, thought content appropriate      Medications:   Scheduled Meds:   metFORMIN  500 mg Oral BID WC    insulin glargine  10 Units SubCUTAneous Nightly    sodium chloride flush  5-40 mL IntraVENous 2 times per day    metoprolol tartrate  25 mg Oral BID    apixaban  5 mg Oral BID    insulin lispro  0-4 Units SubCUTAneous TID WC    insulin lispro  0-4 Units SubCUTAneous Nightly    OLANZapine  5 mg Oral Nightly    atorvastatin  80 mg Oral Nightly    mometasone-formoterol  2 puff Inhalation BID RT    cyanocobalamin  250 mcg Oral Daily    lisinopril  20 mg Oral Daily    magnesium oxide  250 mg Oral Daily    ferrous sulfate  325 mg Oral Daily with breakfast    potassium bicarb-citric acid  40 mEq Oral Once     Continuous Infusions:   sodium chloride      dextrose         Lab Results:   CBC:   Recent Labs     04/06/24 2141 04/07/24 0933 04/08/24 0332   WBC 9.2 11.4* 9.7   HGB 14.7 13.4* 13.6*    248 253       BMP:    Recent Labs     04/07/24 0933 04/07/24 2144 04/08/24 0332 04/09/24  0501     --  141 142   K 2.9*   < > 3.0*  3.0* 3.5     --  104 105   CO2 25  --  26 25   BUN 15  --  10 9   CREATININE 0.8  --

## 2024-04-09 NOTE — FLOWSHEET NOTE
04/09/24 1012   Treatment Team Notification   Reason for Communication Evaluate   Name of Team Member Notified DR. Guzman   Treatment Team Role Attending Provider   Method of Communication Secure Message   Response Waiting for response   Notification Time 1012     Provider notified of vitals and patient not being alert enough to take any oral medications.

## 2024-04-09 NOTE — CARE COORDINATION
4/9/24, 11:54 AM EDT    DISCHARGE PLANNING EVALUATION    TONY met with pt's daughter this morning, she reports she did go to [employer] this morning and turned in application for Medicaid.     Daughter reports preferences for SNF for [facility], [facility] then [facility].     TONY did call Krystle with [facility], referral made.

## 2024-04-09 NOTE — PROGRESS NOTES
04/09/24 1320   Encounter Summary   Encounter Overview/Reason  Attempted Encounter   Service Provided For: Patient   [facility]   Support System Spouse;Children   Last Encounter  04/09/24   Complexity of Encounter Low   Begin Time 1315   End Time  1320   Total Time Calculated 5 min   Assessment/Intervention/Outcome   Assessment Unable to assess   Intervention Prayer (assurance of)/[place]     During my encounter with the 78 yr old patient, I attempted to visit with the pt on 5K. The patient appears to be resting (not responsive/resting) now and I didn't want to disturb the patient. I or another  will attempt to visit the patient or the family at another time. The pt was admitted due to delirium.

## 2024-04-09 NOTE — PROGRESS NOTES
[facility]  INPATIENT OCCUPATIONAL THERAPY  [facility] ONC MED 5K  EVALUATION    Time:   Time In: 1104  Time Out: 1118  Timed Code Treatment Minutes: 0 Minutes  Minutes: 14          Date: 2024  Patient Name: El Leo,   Gender: male      MRN: [id number]  : 1945  (78 y.o.)  Referring Practitioner: Mayte Guzman MD  Diagnosis: hypokalemia  Additional Pertinent Hx: Per EMR: The patient is a 78 y.o. male who presents with  increasing confusion, aggressive behavior, hallucinations, falls at home.  He was relocated to [place] by daughter last September cos of confusion noted by family. He lost his 's License following an accident.  He has been forgetful and recently more aggressive towards his wife. He has visual hallucinations, unsteady gait with falls and urinary incontinence.   He said he fell twice at [employer] and hit his hand.  Actually, EMS was called by family member and the patient fell at home multiple times recently    Restrictions/Precautions:  Restrictions/Precautions: Fall Risk    Subjective  Chart Reviewed: Yes, Orders, Progress Notes, History and Physical  Patient assessed for rehabilitation services?: Yes  Family / Caregiver Present: No    Subjective: Pt supine in bed upon arrival sleeping with telesitter present in room. Pt easily woke, although demoes poor alertness and command following throughout session.  Comments: RN reports pt was given ativan overnight, which likely is contrituting to drowsiness. RN/CM reporting pt will [facility] per family request.    Pain: Pt c/o neck pain when HOB elevated and shoulders repositioned to middle of bed. RN informed.     Vitals: Vitals not assessed per clinical judgement, see nursing flowsheet    Social/Functional History:  Lives With: Spouse                Active : No  Patient's  Info: DaughterJanet     Additional Comments: No family present and pt unable to contribute to PLOF.    VISION:WFL    HEARING:

## 2024-04-09 NOTE — CARE COORDINATION
4/9/24, 1:57 PM EDT    DISCHARGE ON GOING EVALUATION    [facility] day: 3  Location: -20/020-A Reason for admit: Hypokalemia [E87.6]  Delirium [R41.0]  Chronic atrial fibrillation (HCC) [I48.20]  Frequent falls [R29.6]  Altered mental status, unspecified altered mental status type [R41.82]   Barriers to Discharge: Eliquis, DM management, Dulera inhaler, Zyprexa at night, prn Tylenol, Haldol, Ativan, and Zofran, regular diet, PT/OT, SS, Dietician, telemetry, up with assistance.   PCP: Main Caldwell, DO  Readmission Risk Score: 13.5%  Patient Goals/Plan/Treatment Preferences: Candido is from home with his wife. Planning for SNF at discharge.

## 2024-04-09 NOTE — PLAN OF CARE
Problem: Skin/Tissue Integrity  Goal: Absence of new skin breakdown  Description: 1.  Monitor for areas of redness and/or skin breakdown  2.  Assess vascular access sites hourly  3.  Every 4-6 hours minimum:  Change oxygen saturation probe site  4.  Every 4-6 hours:  If on nasal continuous positive airway pressure, respiratory therapy assess nares and determine need for appliance change or resting period.  4/9/2024 1245 by Blanca Giraldo RN  Outcome: Progressing     Problem: ABCDS Injury Assessment  Goal: Absence of physical injury  4/9/2024 1245 by Blanca Giraldo RN  Outcome: Progressing  Flowsheets (Taken 4/9/2024 1245)  Absence of Physical Injury: Implement safety measures based on patient assessment     Problem: Safety - Adult  Goal: Free from fall injury  4/9/2024 1245 by Blanca Giraldo RN  Outcome: Progressing  Flowsheets (Taken 4/9/2024 1245)  Free From Fall Injury:   Instruct family/caregiver on patient safety   Based on caregiver fall risk screen, instruct family/caregiver to ask for assistance with transferring infant if caregiver noted to have fall risk factors       Problem: Chronic Conditions and Co-morbidities  Goal: Patient's chronic conditions and co-morbidity symptoms are monitored and maintained or improved  4/9/2024 1245 by Blanca Giraldo RN  Outcome: Progressing  Flowsheets (Taken 4/9/2024 1245)  Care Plan - Patient's Chronic Conditions and Co-Morbidity Symptoms are Monitored and Maintained or Improved:   Monitor and assess patient's chronic conditions and comorbid symptoms for stability, deterioration, or improvement   Collaborate with multidisciplinary team to address chronic and comorbid conditions and prevent exacerbation or deterioration   Update acute care plan with appropriate goals if chronic or comorbid symptoms are exacerbated and prevent overall improvement and discharge     Problem: Discharge Planning  Goal: Discharge to home or other facility with appropriate  diagnoses, notify LIP   [place] high risk fall precautions, as indicated   Care plan reviewed with patient and family.  Patient and family verbalize understanding of the plan of care and contribute to goal setting.

## 2024-04-10 LAB
ANION GAP SERPL CALC-SCNC: 17 MEQ/L (ref 8–16)
BUN SERPL-MCNC: 16 MG/DL (ref 7–22)
CALCIUM SERPL-MCNC: 8.9 MG/DL (ref 8.5–10.5)
CHLORIDE SERPL-SCNC: 100 MEQ/L (ref 98–111)
CO2 SERPL-SCNC: 23 MEQ/L (ref 23–33)
CREAT SERPL-MCNC: 0.8 MG/DL (ref 0.4–1.2)
DEPRECATED RDW RBC AUTO: 46.3 FL (ref 35–45)
ERYTHROCYTE [DISTWIDTH] IN BLOOD BY AUTOMATED COUNT: 13.6 % (ref 11.5–14.5)
GFR SERPL CREATININE-BSD FRML MDRD: 89 ML/MIN/1.73M2
GLUCOSE BLD STRIP.AUTO-MCNC: 145 MG/DL (ref 70–108)
GLUCOSE BLD STRIP.AUTO-MCNC: 184 MG/DL (ref 70–108)
GLUCOSE BLD STRIP.AUTO-MCNC: 200 MG/DL (ref 70–108)
GLUCOSE BLD STRIP.AUTO-MCNC: 206 MG/DL (ref 70–108)
GLUCOSE SERPL-MCNC: 205 MG/DL (ref 70–108)
HCT VFR BLD AUTO: 43.6 % (ref 42–52)
HGB BLD-MCNC: 14.5 GM/DL (ref 14–18)
MCH RBC QN AUTO: 31.2 PG (ref 26–33)
MCHC RBC AUTO-ENTMCNC: 33.3 GM/DL (ref 32.2–35.5)
MCV RBC AUTO: 93.8 FL (ref 80–94)
PLATELET # BLD AUTO: 270 THOU/MM3 (ref 130–400)
PMV BLD AUTO: 9.7 FL (ref 9.4–12.4)
POTASSIUM SERPL-SCNC: 3.7 MEQ/L (ref 3.5–5.2)
RBC # BLD AUTO: 4.65 MILL/MM3 (ref 4.7–6.1)
SODIUM SERPL-SCNC: 140 MEQ/L (ref 135–145)
WBC # BLD AUTO: 10 THOU/MM3 (ref 4.8–10.8)

## 2024-04-10 PROCEDURE — 94640 AIRWAY INHALATION TREATMENT: CPT

## 2024-04-10 PROCEDURE — 97110 THERAPEUTIC EXERCISES: CPT

## 2024-04-10 PROCEDURE — 2580000003 HC RX 258: Performed by: INTERNAL MEDICINE

## 2024-04-10 PROCEDURE — 97530 THERAPEUTIC ACTIVITIES: CPT

## 2024-04-10 PROCEDURE — 97162 PT EVAL MOD COMPLEX 30 MIN: CPT

## 2024-04-10 PROCEDURE — 80048 BASIC METABOLIC PNL TOTAL CA: CPT

## 2024-04-10 PROCEDURE — 85027 COMPLETE CBC AUTOMATED: CPT

## 2024-04-10 PROCEDURE — 6360000002 HC RX W HCPCS: Performed by: INTERNAL MEDICINE

## 2024-04-10 PROCEDURE — 82948 REAGENT STRIP/BLOOD GLUCOSE: CPT

## 2024-04-10 PROCEDURE — 1200000003 HC TELEMETRY R&B

## 2024-04-10 PROCEDURE — 94760 N-INVAS EAR/PLS OXIMETRY 1: CPT

## 2024-04-10 PROCEDURE — 6370000000 HC RX 637 (ALT 250 FOR IP): Performed by: INTERNAL MEDICINE

## 2024-04-10 PROCEDURE — 36415 COLL VENOUS BLD VENIPUNCTURE: CPT

## 2024-04-10 RX ORDER — INSULIN GLARGINE 100 [IU]/ML
12 INJECTION, SOLUTION SUBCUTANEOUS NIGHTLY
Status: DISCONTINUED | OUTPATIENT
Start: 2024-04-10 | End: 2024-04-12

## 2024-04-10 RX ORDER — LORAZEPAM 2 MG/ML
0.5 INJECTION INTRAMUSCULAR ONCE
Status: COMPLETED | OUTPATIENT
Start: 2024-04-10 | End: 2024-04-10

## 2024-04-10 RX ADMIN — LISINOPRIL 20 MG: 20 TABLET ORAL at 07:43

## 2024-04-10 RX ADMIN — METOPROLOL TARTRATE 25 MG: 25 TABLET, FILM COATED ORAL at 20:01

## 2024-04-10 RX ADMIN — METFORMIN HYDROCHLORIDE 500 MG: 500 TABLET ORAL at 07:43

## 2024-04-10 RX ADMIN — OLANZAPINE 7.5 MG: 5 TABLET, FILM COATED ORAL at 20:01

## 2024-04-10 RX ADMIN — METFORMIN HYDROCHLORIDE 500 MG: 500 TABLET ORAL at 18:26

## 2024-04-10 RX ADMIN — LORAZEPAM 0.5 MG: 2 INJECTION INTRAMUSCULAR; INTRAVENOUS at 23:09

## 2024-04-10 RX ADMIN — LORAZEPAM 0.5 MG: 2 INJECTION INTRAMUSCULAR; INTRAVENOUS at 22:04

## 2024-04-10 RX ADMIN — METOPROLOL TARTRATE 25 MG: 25 TABLET, FILM COATED ORAL at 07:43

## 2024-04-10 RX ADMIN — LORAZEPAM 0.5 MG: 2 INJECTION INTRAMUSCULAR; INTRAVENOUS at 01:27

## 2024-04-10 RX ADMIN — HALOPERIDOL LACTATE 2 MG: 5 INJECTION, SOLUTION INTRAMUSCULAR at 21:52

## 2024-04-10 RX ADMIN — Medication 250 MG: at 07:43

## 2024-04-10 RX ADMIN — MOMETASONE FUROATE AND FORMOTEROL FUMARATE DIHYDRATE 2 PUFF: 200; 5 AEROSOL RESPIRATORY (INHALATION) at 07:54

## 2024-04-10 RX ADMIN — INSULIN GLARGINE 12 UNITS: 100 INJECTION, SOLUTION SUBCUTANEOUS at 23:27

## 2024-04-10 RX ADMIN — LORAZEPAM 0.5 MG: 2 INJECTION INTRAMUSCULAR; INTRAVENOUS at 11:16

## 2024-04-10 RX ADMIN — MOMETASONE FUROATE AND FORMOTEROL FUMARATE DIHYDRATE 2 PUFF: 200; 5 AEROSOL RESPIRATORY (INHALATION) at 18:20

## 2024-04-10 RX ADMIN — FERROUS SULFATE TAB 325 MG (65 MG ELEMENTAL FE) 325 MG: 325 (65 FE) TAB at 07:43

## 2024-04-10 RX ADMIN — DONEPEZIL HYDROCHLORIDE 5 MG: 5 TABLET, FILM COATED ORAL at 20:01

## 2024-04-10 RX ADMIN — APIXABAN 5 MG: 5 TABLET, FILM COATED ORAL at 07:43

## 2024-04-10 RX ADMIN — Medication 250 MCG: at 07:43

## 2024-04-10 RX ADMIN — HALOPERIDOL LACTATE 2 MG: 5 INJECTION, SOLUTION INTRAMUSCULAR at 12:16

## 2024-04-10 RX ADMIN — ATORVASTATIN CALCIUM 80 MG: 80 TABLET, FILM COATED ORAL at 20:01

## 2024-04-10 RX ADMIN — APIXABAN 5 MG: 5 TABLET, FILM COATED ORAL at 20:01

## 2024-04-10 RX ADMIN — SODIUM CHLORIDE, PRESERVATIVE FREE 10 ML: 5 INJECTION INTRAVENOUS at 20:04

## 2024-04-10 RX ADMIN — SODIUM CHLORIDE, PRESERVATIVE FREE 10 ML: 5 INJECTION INTRAVENOUS at 07:42

## 2024-04-10 RX ADMIN — INSULIN LISPRO 1 UNITS: 100 INJECTION, SOLUTION INTRAVENOUS; SUBCUTANEOUS at 18:25

## 2024-04-10 RX ADMIN — ACETAMINOPHEN 650 MG: 325 TABLET ORAL at 20:02

## 2024-04-10 RX ADMIN — INSULIN LISPRO 1 UNITS: 100 INJECTION, SOLUTION INTRAVENOUS; SUBCUTANEOUS at 13:51

## 2024-04-10 ASSESSMENT — PAIN DESCRIPTION - FREQUENCY: FREQUENCY: INTERMITTENT

## 2024-04-10 ASSESSMENT — PAIN DESCRIPTION - LOCATION: LOCATION: NECK

## 2024-04-10 ASSESSMENT — PAIN DESCRIPTION - DESCRIPTORS: DESCRIPTORS: ACHING

## 2024-04-10 ASSESSMENT — PAIN DESCRIPTION - PAIN TYPE: TYPE: ACUTE PAIN

## 2024-04-10 ASSESSMENT — PAIN - FUNCTIONAL ASSESSMENT: PAIN_FUNCTIONAL_ASSESSMENT: ACTIVITIES ARE NOT PREVENTED

## 2024-04-10 ASSESSMENT — PAIN DESCRIPTION - ORIENTATION: ORIENTATION: POSTERIOR

## 2024-04-10 NOTE — PLAN OF CARE
Problem: Skin/Tissue Integrity  Goal: Absence of new skin breakdown  Description: 1.  Monitor for areas of redness and/or skin breakdown  2.  Assess vascular access sites hourly  3.  Every 4-6 hours minimum:  Change oxygen saturation probe site  4.  Every 4-6 hours:  If on nasal continuous positive airway pressure, respiratory therapy assess nares and determine need for appliance change or resting period.  4/10/2024 0032 by Prachi Ryan RN  Outcome: Progressing  Note: No new skin integrity issues at this time.     Problem: ABCDS Injury Assessment  Goal: Absence of physical injury  4/10/2024 0032 by Prachi Ryan RN  Outcome: Progressing  Flowsheets (Taken 4/10/2024 0032)  Absence of Physical Injury: Implement safety measures based on patient assessment     Problem: Safety - Adult  Goal: Free from fall injury  4/10/2024 0032 by Prachi Ryan RN  Outcome: Progressing  Note: Telesitter is present at this time.     Problem: Chronic Conditions and Co-morbidities  Goal: Patient's chronic conditions and co-morbidity symptoms are monitored and maintained or improved  4/10/2024 0032 by Prachi Ryan RN  Outcome: Progressing  Flowsheets (Taken 4/10/2024 0032)  Care Plan - Patient's Chronic Conditions and Co-Morbidity Symptoms are Monitored and Maintained or Improved: Monitor and assess patient's chronic conditions and comorbid symptoms for stability, deterioration, or improvement     Problem: Discharge Planning  Goal: Discharge to home or other facility with appropriate resources  4/10/2024 0032 by Prachi Ryan RN  Outcome: Progressing  Flowsheets (Taken 4/10/2024 0032)  Discharge to home or other facility with appropriate resources:   Identify barriers to discharge with patient and caregiver   Identify discharge learning needs (meds, wound care, etc)    Care plan reviewed with patient.  Patient verbalizes understanding of the plan of care and contributes to goal setting.

## 2024-04-10 NOTE — PROGRESS NOTES
INTERNAL MEDICINE Progress Note  4/10/2024 2:02 PM  Subjective:   Admit Date: 4/6/2024  PCP: Main Caldwell, DO  Interval History:     Still with periodic agitations, , hallucinations, on Olanzapine  Alert and oriented to person, year    Objective:   Vitals: BP (!) 168/73   Pulse 59   Temp 98.1 °F (36.7 °C) (Oral)   Resp 16   Ht 1.778 m (5' 10\")   Wt 108.7 kg (239 lb 9.6 oz)   SpO2 95%   BMI 34.38 kg/m²   General appearance: alert and cooperative with exam  HEENT: atraumatic  Neck: no adenopathy, no carotid bruit, no JVD, and supple, symmetrical, trachea midline  Lungs: clear to auscultation bilaterally  Heart: regular rate and rhythm and S1, S2 normal  Abdomen: soft, non-tender; bowel sounds normal; no masses,  no organomegaly  Extremities: extremities normal, atraumatic, no cyanosis or edema  Neurologic: Alert, oriented, thought content appropriate      Medications:   Scheduled Meds:   donepezil  5 mg Oral Nightly    OLANZapine  7.5 mg Oral Nightly    metFORMIN  500 mg Oral BID WC    insulin glargine  10 Units SubCUTAneous Nightly    sodium chloride flush  5-40 mL IntraVENous 2 times per day    metoprolol tartrate  25 mg Oral BID    apixaban  5 mg Oral BID    insulin lispro  0-4 Units SubCUTAneous TID WC    insulin lispro  0-4 Units SubCUTAneous Nightly    atorvastatin  80 mg Oral Nightly    mometasone-formoterol  2 puff Inhalation BID RT    cyanocobalamin  250 mcg Oral Daily    lisinopril  20 mg Oral Daily    magnesium oxide  250 mg Oral Daily    ferrous sulfate  325 mg Oral Daily with breakfast    potassium bicarb-citric acid  40 mEq Oral Once     Continuous Infusions:   sodium chloride      dextrose         Lab Results:   CBC:   Recent Labs     04/08/24  0332   WBC 9.7   HGB 13.6*          BMP:    Recent Labs     04/08/24  0332 04/09/24  0501    142   K 3.0*  3.0* 3.5    105   CO2 26 25   BUN 10 9   CREATININE 0.8 0.8   GLUCOSE 60* 100       Hepatic:   No results for input(s):  incontinence  Dementia with behavioral disturbance,     -cont aricept, zyprexa   -consult Psychiatry  HTN  DM 2  Chronic a fib  hypokalemia    plan   replace K as needed  Fall precautions.  eliquis   lantus  Cont Zyprexa, Aricept  Am labs.    Mayte Guzman MD, MD

## 2024-04-10 NOTE — CARE COORDINATION
4/10/24, 11:48 AM EDT    DISCHARGE PLANNING EVALUATION    SW stopped by pt's room, pt continues in restraints, telesitter in place, pt sleeping, no family at bedside.

## 2024-04-10 NOTE — PROGRESS NOTES
[facility]  INPATIENT PHYSICAL THERAPY  EVALUATION  [facility] ONC MED 5K - 5K-20/020-A    Time In: 1645  Time Out: 1732  Timed Code Treatment Minutes: 30 Minutes  Minutes: 47          Date: 4/10/2024  Patient Name: El Leo,  Gender:  male        MRN: [id number]  : 1945  (78 y.o.)      Referring Practitioner: Mayte Guzman MD  Diagnosis: Hypokalemia  Additional Pertinent Hx: Per EMR: The patient is a 78 y.o. male who presents with  increasing confusion, aggressive behavior, hallucinations, falls at home.  He was relocated to [place] by daughter last September cos of confusion noted by family. He lost his 's License following an accident.  He has been forgetful and recently more aggressive towards his wife. He has visual hallucinations, unsteady gait with falls and urinary incontinence.   He said he fell twice at [employer] and hit his hand.  Actually, EMS was called by family member and the patient fell at home multiple times recently     Restrictions/Precautions:  Restrictions/Precautions: Fall Risk    Subjective:  Chart Reviewed: Yes  Patient assessed for rehabilitation services?: Yes  Subjective: Pt resting in bed and seems a bit restless. Pt noted to need to have linens and depends changed due to bladder incontinence. RN approved session. Pt's daughter present and helpful with home info and PLOF.    General:     Vision: Impaired  Vision Exceptions: Wears glasses at all times  Hearing: Within functional limits       Pain: 5-6/10: low back. Pt has chronic back pain from old injuries    Vitals: Vitals not assessed per clinical judgement, see nursing flowsheet    Social/Functional History:    Lives With: Spouse  Type of Home: Apartment  Home Layout: One level  Home Access: Level entry  Home Equipment: Walker, 4 wheeled, Lift chair          Receives Help From: Family, Neighbor  ADL Assistance: Needs assistance  Homemaking Assistance: Needs assistance  Ambulation Assistance:  past medical, social and functional history, completion of standardized testing, formal and informal observation of tasks, assessment of data and development of plan of care and goals.  Treatment time included skilled education and facilitation of tasks to increase safety and independence with functional mobility for improved independence and quality of life.    Assessment:  Body Structures, Functions, Activity Limitations Requiring Skilled Therapeutic Intervention: Decreased functional mobility , Decreased endurance, Decreased balance, Increased pain, Decreased strength, Decreased safe awareness  Assessment: Pt is an 79 yo male that has been having some recent falls. Pt having some cognitive impairments and had been requiring restraints and sedating meds earlier today due to agitation. Pt participated well. Pt was generally Supervision to Mod I for mobility in PLOF and is at Min A to CGA today. Pt is not safe for return to home at this time and would benefit from continued skilled PT to address strengthening, balance, bed mobility, endurance building, and functional mobility training.    Therapy Prognosis: Good    Requires PT Follow-Up: Yes    Discharge Recommendations:  Discharge Recommendations: Continue to assess pending progress, Subacute/Skilled Nursing Facility, Patient would benefit from continued therapy after discharge, Therapy recommended at discharge    Patient Education:      .    Patient Education  Education Given To: Patient, Family  Education Provided: Role of Therapy, Plan of Care, Home Exercise Program  Education Method: Demonstration, Verbal  Barriers to Learning: Cognition  Education Outcome: Verbalized understanding, Demonstrated understanding       Equipment Recommendations:  Other: monitor for needs    Plan:  Current Treatment Recommendations: Strengthening, Balance training, Gait training, Functional mobility training, Transfer training, Safety education & training, Home exercise program,

## 2024-04-10 NOTE — CARE COORDINATION
4/10/24, 2:33 PM EDT    DISCHARGE ON GOING EVALUATION    [facility] day: 4  Location: -20/020-A Reason for admit: Hypokalemia [E87.6]  Delirium [R41.0]  Chronic atrial fibrillation (HCC) [I48.20]  Frequent falls [R29.6]  Altered mental status, unspecified altered mental status type [R41.82]     Procedures: n/a     Imaging since last note: n/a    Barriers to Discharge: Hospitalist following. Daily labs. PRN IV electrolyte replacement and ativan. Telesitter, soft restraints.     PCP: Main Caldwell DO  Readmission Risk Score: 13.1%    Patient Goals/Plan/Treatment Preferences: SW made referral to Oralia Florez.

## 2024-04-10 NOTE — PLAN OF CARE
Problem: Skin/Tissue Integrity  Goal: Absence of new skin breakdown  Description: 1.  Monitor for areas of redness and/or skin breakdown  2.  Assess vascular access sites hourly  3.  Every 4-6 hours minimum:  Change oxygen saturation probe site  4.  Every 4-6 hours:  If on nasal continuous positive airway pressure, respiratory therapy assess nares and determine need for appliance change or resting period.  4/10/2024 0959 by Kamar Hayden RN  Outcome: Progressing     Problem: ABCDS Injury Assessment  Goal: Absence of physical injury  4/10/2024 0959 by Kamar Hayden RN  Outcome: Progressing     Problem: Safety - Adult  Goal: Free from fall injury  4/10/2024 0959 by Kamar Hayden RN  Outcome: Progressing     Problem: Safety - Medical Restraint  Goal: Remains free of injury from restraints (Restraint for Interference with Medical Device)  Description: INTERVENTIONS:  1. Determine that other, less restrictive measures have been tried or would not be effective before applying the restraint  2. Evaluate the patient's condition at the time of restraint application  3. Inform patient/family regarding the reason for restraint  4. Q2H: Monitor safety, psychosocial status, comfort, nutrition and hydration  4/10/2024 0959 by Kamar Hayden RN  Outcome: Progressing     Problem: Chronic Conditions and Co-morbidities  Goal: Patient's chronic conditions and co-morbidity symptoms are monitored and maintained or improved  4/10/2024 0959 by Kamar Hayden RN  Outcome: Progressing     Problem: Discharge Planning  Goal: Discharge to home or other facility with appropriate resources  4/10/2024 0959 by Kamar Hayden RN  Outcome: Progressing     Problem: Nutrition Deficit:  Goal: Optimize nutritional status  4/10/2024 0959 by Kamar Hayden RN  Outcome: Progressing     Problem: Confusion  Goal: Confusion, delirium, dementia, or psychosis is improved or at baseline  Description: INTERVENTIONS:  1. Assess for possible contributors to

## 2024-04-11 PROBLEM — R41.82 ALTERED MENTAL STATUS: Status: ACTIVE | Noted: 2024-04-11

## 2024-04-11 LAB
BACTERIA BLD AEROBE CULT: NORMAL
BACTERIA BLD AEROBE CULT: NORMAL
EKG Q-T INTERVAL: 468 MS
EKG QRS DURATION: 150 MS
EKG QTC CALCULATION (BAZETT): 471 MS
EKG R AXIS: -76 DEGREES
EKG T AXIS: 0 DEGREES
EKG VENTRICULAR RATE: 61 BPM
GLUCOSE BLD STRIP.AUTO-MCNC: 145 MG/DL (ref 70–108)
GLUCOSE BLD STRIP.AUTO-MCNC: 146 MG/DL (ref 70–108)
GLUCOSE BLD STRIP.AUTO-MCNC: 152 MG/DL (ref 70–108)
GLUCOSE BLD STRIP.AUTO-MCNC: 154 MG/DL (ref 70–108)

## 2024-04-11 PROCEDURE — 1200000003 HC TELEMETRY R&B

## 2024-04-11 PROCEDURE — 99223 1ST HOSP IP/OBS HIGH 75: CPT

## 2024-04-11 PROCEDURE — 6360000002 HC RX W HCPCS: Performed by: INTERNAL MEDICINE

## 2024-04-11 PROCEDURE — 6370000000 HC RX 637 (ALT 250 FOR IP): Performed by: INTERNAL MEDICINE

## 2024-04-11 PROCEDURE — 2580000003 HC RX 258: Performed by: INTERNAL MEDICINE

## 2024-04-11 PROCEDURE — 95819 EEG AWAKE AND ASLEEP: CPT | Performed by: PSYCHIATRY & NEUROLOGY

## 2024-04-11 PROCEDURE — 94640 AIRWAY INHALATION TREATMENT: CPT

## 2024-04-11 PROCEDURE — 82948 REAGENT STRIP/BLOOD GLUCOSE: CPT

## 2024-04-11 PROCEDURE — 95819 EEG AWAKE AND ASLEEP: CPT

## 2024-04-11 RX ORDER — OLANZAPINE 10 MG/1
10 TABLET ORAL NIGHTLY
Status: DISCONTINUED | OUTPATIENT
Start: 2024-04-11 | End: 2024-04-18 | Stop reason: HOSPADM

## 2024-04-11 RX ADMIN — HALOPERIDOL LACTATE 2 MG: 5 INJECTION, SOLUTION INTRAMUSCULAR at 23:23

## 2024-04-11 RX ADMIN — METOPROLOL TARTRATE 25 MG: 25 TABLET, FILM COATED ORAL at 20:22

## 2024-04-11 RX ADMIN — SODIUM CHLORIDE, PRESERVATIVE FREE 10 ML: 5 INJECTION INTRAVENOUS at 10:09

## 2024-04-11 RX ADMIN — ATORVASTATIN CALCIUM 80 MG: 80 TABLET, FILM COATED ORAL at 20:20

## 2024-04-11 RX ADMIN — SODIUM CHLORIDE, PRESERVATIVE FREE 10 ML: 5 INJECTION INTRAVENOUS at 20:22

## 2024-04-11 RX ADMIN — APIXABAN 5 MG: 5 TABLET, FILM COATED ORAL at 20:20

## 2024-04-11 RX ADMIN — MOMETASONE FUROATE AND FORMOTEROL FUMARATE DIHYDRATE 2 PUFF: 200; 5 AEROSOL RESPIRATORY (INHALATION) at 16:19

## 2024-04-11 RX ADMIN — OLANZAPINE 10 MG: 5 TABLET, FILM COATED ORAL at 20:22

## 2024-04-11 RX ADMIN — INSULIN GLARGINE 12 UNITS: 100 INJECTION, SOLUTION SUBCUTANEOUS at 20:20

## 2024-04-11 RX ADMIN — DONEPEZIL HYDROCHLORIDE 5 MG: 5 TABLET, FILM COATED ORAL at 20:21

## 2024-04-11 RX ADMIN — HALOPERIDOL LACTATE 2 MG: 5 INJECTION, SOLUTION INTRAMUSCULAR at 05:17

## 2024-04-11 ASSESSMENT — PAIN SCALES - GENERAL: PAINLEVEL_OUTOF10: 0

## 2024-04-11 NOTE — CONSULTS
Neurology Consult Note    Date:4/11/2024       Room:[facility]20/020-A  Patient Name:El Leo     YOB: 1945     Age:78 y.o.    Requesting Physician: Mayte Guzman MD     Reason for Consult:  Evaluate for AMS/confusion, abm MRI with ventriculomegaly      Chief Complaint:   Chief Complaint   Patient presents with    Altered Mental Status    Hallucinations       Subjective     El Leo is a 78 y.o. male with a history of asbestosis, ASHD, A-fib on Eliquis, COPD, diabetes, hyperlipidemia, GERD, previous CVA, hypertension, USMAN, osteoarthritis who originally presented to [facility] ED 4/6 for evaluation of frequent falls and visual hallucinations.  Patient has been noted to be with increasing confusion, aggressive behavior, visual hallucinations, urinary incontinence, and multiple falls at home secondary to gait instability.  Per chart review patient was relocated to [place] by his daughter last September because of confusion.  Patient states that he knows that he has been having visual hallucinations.  He states that he typically sees people or animals.  He states he is very unsteady on his feet which has resulted in several falls at home.  He admits to urinary incontinence.  MRI brain obtained 4/8 and was negative for any acute infarct, did reveal dilation of the third and lateral ventricles.  Neurology consulted today for concerns of NPH.  Patient is with symptoms consistent with NPH and with imaging concerning for NPH.  Recommend consult to neurosurgery for further evaluation of normal pressure hydrocephalus.  On exam patient is pleasant, he was alert and oriented.  He was able to follow simple commands and identify objects.  He does admit to pain in his bilateral upper extremities secondary to falls.  He states he does have a walker he uses at home but states he is still falling while using it.  He denies any headaches, dizziness, recent illness, vision changes, numbness, weakness.    Review of Systems  Palpations: Abdomen is soft.      Tenderness: There is no abdominal tenderness.   Musculoskeletal:         General: Normal range of motion.      Right lower leg: No edema.      Left lower leg: No edema.   Skin:     General: Skin is warm.      Findings: Bruising (Scattered throughout) present. No rash.   Neurological:      Mental Status: He is alert and oriented to person, place, and time.      Coordination: Finger-Nose-Finger Test and Heel to Shin Test normal.   Psychiatric:         Mood and Affect: Mood normal.         Speech: Speech normal.         Behavior: Behavior normal.       Neurologic Exam     Mental Status   Oriented to person, place, and time.   Follows 1 step commands.   Attention: normal. Concentration: normal.   Speech: speech is normal   Level of consciousness: alert  Able to name object. Able to repeat. Normal comprehension.     Cranial Nerves     CN II   Visual fields full to confrontation.   Right visual field deficit: none  Left visual field deficit: none     CN III, IV, VI   Pupils are equal, round, and reactive to light.  Extraocular motions are normal.   Right pupil: Shape: regular. Reactivity: brisk.   Left pupil: Shape: regular. Reactivity: brisk.     CN V   Facial sensation intact.   Right facial sensation deficit: none  Left facial sensation deficit: none    CN VII   Facial expression full, symmetric.   Right facial weakness: none  Left facial weakness: none    CN VIII   CN VIII normal.   Hearing: intact    CN IX, X   CN IX normal.   Palate: symmetric    CN XI   CN XI normal.   Right trapezius strength: normal  Left trapezius strength: normal    CN XII   CN XII normal.   Tongue: not atrophic  Fasciculations: absent  Tongue deviation: none    Motor Exam   Muscle bulk: normal  Overall muscle tone: normal  Right arm tone: normal  Left arm tone: normal  Right arm pronator drift: absent  Left arm pronator drift: absent  Right leg tone: normal  Left leg tone: normal  Generalized weakness noted

## 2024-04-11 NOTE — PROGRESS NOTES
Orders for restraints were discontinued due to patient resting and comfortable, is not displaying any signs of anxiousness or restlessness.

## 2024-04-11 NOTE — FLOWSHEET NOTE
Department of Psychiatry  Consult Service  Progress Note     Reason for Consult: hallucinations, memory loss, aggression    SUBJECTIVE:      Patient unable to be easily roused, noted  Haldol x 3 last dose 0517 in AM, as well as ativan x 3 in last 24 hours. As patient is sedated, psychiatry will reassess at later time when patient is less sommulent. Per chart review, noted hallucinations, unstead gait, and urinary incontience. MRI does demonstrate ventricular dilation.      OBJECTIVE      Medications  Current Facility-Administered Medications: insulin glargine (LANTUS) injection vial 12 Units, 12 Units, SubCUTAneous, Nightly  donepezil (ARICEPT) tablet 5 mg, 5 mg, Oral, Nightly  OLANZapine (ZYPREXA) tablet 7.5 mg, 7.5 mg, Oral, Nightly  metFORMIN (GLUCOPHAGE) tablet 500 mg, 500 mg, Oral, BID WC  sodium chloride flush 0.9 % injection 5-40 mL, 5-40 mL, IntraVENous, 2 times per day  sodium chloride flush 0.9 % injection 5-40 mL, 5-40 mL, IntraVENous, PRN  0.9 % sodium chloride infusion, , IntraVENous, PRN  potassium chloride (KLOR-CON M) extended release tablet 40 mEq, 40 mEq, Oral, PRN **OR** potassium bicarb-citric acid (EFFER-K) effervescent tablet 40 mEq, 40 mEq, Oral, PRN **OR** potassium chloride 10 mEq/100 mL IVPB (Peripheral Line), 10 mEq, IntraVENous, PRN  magnesium sulfate 2000 mg in 50 mL IVPB premix, 2,000 mg, IntraVENous, PRN  ondansetron (ZOFRAN-ODT) disintegrating tablet 4 mg, 4 mg, Oral, Q8H PRN **OR** ondansetron (ZOFRAN) injection 4 mg, 4 mg, IntraVENous, Q6H PRN  polyethylene glycol (GLYCOLAX) packet 17 g, 17 g, Oral, Daily PRN  acetaminophen (TYLENOL) tablet 650 mg, 650 mg, Oral, Q6H PRN **OR** acetaminophen (TYLENOL) suppository 650 mg, 650 mg, Rectal, Q6H PRN  metoprolol tartrate (LOPRESSOR) tablet 25 mg, 25 mg, Oral, BID  apixaban (ELIQUIS) tablet 5 mg, 5 mg, Oral, BID  insulin lispro (HUMALOG) injection vial 0-4 Units, 0-4 Units, SubCUTAneous, TID WC  insulin lispro (HUMALOG) injection vial 0-4

## 2024-04-11 NOTE — PLAN OF CARE
Problem: Skin/Tissue Integrity  Goal: Absence of new skin breakdown  Description: 1.  Monitor for areas of redness and/or skin breakdown  2.  Assess vascular access sites hourly  3.  Every 4-6 hours minimum:  Change oxygen saturation probe site  4.  Every 4-6 hours:  If on nasal continuous positive airway pressure, respiratory therapy assess nares and determine need for appliance change or resting period.  Outcome: Progressing     Problem: ABCDS Injury Assessment  Goal: Absence of physical injury  Outcome: Progressing     Problem: Safety - Adult  Goal: Free from fall injury  Outcome: Progressing     Problem: Safety - Medical Restraint  Goal: Remains free of injury from restraints (Restraint for Interference with Medical Device)  Description: INTERVENTIONS:  1. Determine that other, less restrictive measures have been tried or would not be effective before applying the restraint  2. Evaluate the patient's condition at the time of restraint application  3. Inform patient/family regarding the reason for restraint  4. Q2H: Monitor safety, psychosocial status, comfort, nutrition and hydration  Outcome: Progressing     Problem: Chronic Conditions and Co-morbidities  Goal: Patient's chronic conditions and co-morbidity symptoms are monitored and maintained or improved  Outcome: Progressing     Problem: Discharge Planning  Goal: Discharge to home or other facility with appropriate resources  Outcome: Progressing     Problem: Nutrition Deficit:  Goal: Optimize nutritional status  Outcome: Progressing     Problem: Confusion  Goal: Confusion, delirium, dementia, or psychosis is improved or at baseline  Description: INTERVENTIONS:  1. Assess for possible contributors to thought disturbance, including medications, impaired vision or hearing, underlying metabolic abnormalities, dehydration, psychiatric diagnoses, and notify attending LIP  2. [place] high risk fall precautions, as indicated  3. Provide frequent short contacts

## 2024-04-11 NOTE — PROGRESS NOTES
[facility]     Neurodiagnostic Laboratory Technician Worksheet      EEG Date: 2024    Name: El Leo  : 1945  Age: 78 y.o.  Sex: male  MRN: [id number]  CSN: [id number]    Room/Location: [address] [place] [address]  Ordering Provider: HILLARY Singh    EEG Number: [id number]    Time In: 1316  Time Out: 1338  Total Treatment Time: 20 mins    Clinical History: pt confused having hallucinations, Combative and having frequent falls recently at home.    Hand Dominance: Right   Sedation: No, Ativan given at 1116   H.V. Completed: No, age protocol   Photic: Yes   Sleep: Yes   Drowsy: Yes   Sleep Deprived: No   Seizures Observed: No   Mentality: obtunded     Technician: Yael Castillo 2024

## 2024-04-11 NOTE — PROGRESS NOTES
Value Date/Time    MG 1.9 04/09/2024 05:01 AM     HgBA1c:    Lab Results   Component Value Date/Time    LABA1C 8.0 04/08/2024 03:32 AM     TSH:    Lab Results   Component Value Date/Time    TSH 1.300 04/08/2024 03:32 AM     FOLATE:    Lab Results   Component Value Date/Time    FOLATE 14.3 04/08/2024 03:32 AM     MRI BRAIN WO CONTRAST   CLINICAL INFORMATION AMS.   COMPARISON: CT scan of the brain dated 4/6/2024..     TECHNIQUE: Multiplanar and multiple spin echo MRI images were obtained of the brain without contrast.     FINDINGS:   The diffusion-weighted images are normal.  The brain volume is reduced. There is a mild amount of signal hyperintensity on the FLAIR and T2-weighted sequences in the white matter of the brain. This is consistent with mild severity chronic small vessel   ischemic changes.     There are no intra-or extra-axial collections.  There is mild dilatation of the third and lateral ventricles. There is no  midline shift or mass effect.     There are no areas of susceptibility artifact noted to. The major intracranial vascular flow voids are present.     There is some soft tissue thickening behind the dens..  The pituitary gland and brainstem are normal.     There is increased signal intensity in ethmoid air cells and mastoid air cells bilaterally consistent with inflammatory changes.   The patient is status post bilateral lens removal.   IMPRESSION:   1. Mild atrophy and dilatation of the third and lateral ventricles.  2. Probable ischemic changes in the white matter. No evidence for an acute infarct.  3. Mild inflammatory changes in ethmoid air cells and mastoid air cells.  4. Pannus formation surrounding the dens.       Assessment and Plan:   AMS / hallucinations  Confusion in the setting of memory loss and urinary incontinence,   -neurology consulted for ?NPH  Dementia with behavioral disturbance,     -cont aricept, zyprexa   -consult Psychiatry  HTN  DM 2  Chronic a fib  hypokalemia    plan    Fall precautions.  eliquis   lantus  Cont Zyprexa, Aricept    Mayte Guzman MD, MD

## 2024-04-12 LAB
ANION GAP SERPL CALC-SCNC: 13 MEQ/L (ref 8–16)
BUN SERPL-MCNC: 18 MG/DL (ref 7–22)
CALCIUM SERPL-MCNC: 8.7 MG/DL (ref 8.5–10.5)
CHLORIDE SERPL-SCNC: 104 MEQ/L (ref 98–111)
CO2 SERPL-SCNC: 27 MEQ/L (ref 23–33)
CREAT SERPL-MCNC: 0.8 MG/DL (ref 0.4–1.2)
GFR SERPL CREATININE-BSD FRML MDRD: 89 ML/MIN/1.73M2
GLUCOSE BLD STRIP.AUTO-MCNC: 152 MG/DL (ref 70–108)
GLUCOSE BLD STRIP.AUTO-MCNC: 199 MG/DL (ref 70–108)
GLUCOSE BLD STRIP.AUTO-MCNC: 287 MG/DL (ref 70–108)
GLUCOSE BLD STRIP.AUTO-MCNC: 328 MG/DL (ref 70–108)
GLUCOSE SERPL-MCNC: 170 MG/DL (ref 70–108)
POTASSIUM SERPL-SCNC: 3.5 MEQ/L (ref 3.5–5.2)
SODIUM SERPL-SCNC: 144 MEQ/L (ref 135–145)

## 2024-04-12 PROCEDURE — 6370000000 HC RX 637 (ALT 250 FOR IP): Performed by: INTERNAL MEDICINE

## 2024-04-12 PROCEDURE — 99222 1ST HOSP IP/OBS MODERATE 55: CPT | Performed by: PSYCHIATRY & NEUROLOGY

## 2024-04-12 PROCEDURE — 36415 COLL VENOUS BLD VENIPUNCTURE: CPT

## 2024-04-12 PROCEDURE — 6360000002 HC RX W HCPCS: Performed by: INTERNAL MEDICINE

## 2024-04-12 PROCEDURE — 2580000003 HC RX 258: Performed by: INTERNAL MEDICINE

## 2024-04-12 PROCEDURE — 1200000003 HC TELEMETRY R&B

## 2024-04-12 PROCEDURE — 82948 REAGENT STRIP/BLOOD GLUCOSE: CPT

## 2024-04-12 PROCEDURE — 99223 1ST HOSP IP/OBS HIGH 75: CPT | Performed by: NEUROLOGICAL SURGERY

## 2024-04-12 PROCEDURE — 80048 BASIC METABOLIC PNL TOTAL CA: CPT

## 2024-04-12 PROCEDURE — 94640 AIRWAY INHALATION TREATMENT: CPT

## 2024-04-12 RX ORDER — LORAZEPAM 2 MG/ML
1 INJECTION INTRAMUSCULAR ONCE
Status: COMPLETED | OUTPATIENT
Start: 2024-04-13 | End: 2024-04-12

## 2024-04-12 RX ORDER — INSULIN GLARGINE 100 [IU]/ML
16 INJECTION, SOLUTION SUBCUTANEOUS NIGHTLY
Status: DISCONTINUED | OUTPATIENT
Start: 2024-04-12 | End: 2024-04-15

## 2024-04-12 RX ADMIN — MOMETASONE FUROATE AND FORMOTEROL FUMARATE DIHYDRATE 2 PUFF: 200; 5 AEROSOL RESPIRATORY (INHALATION) at 20:41

## 2024-04-12 RX ADMIN — METFORMIN HYDROCHLORIDE 500 MG: 500 TABLET ORAL at 08:40

## 2024-04-12 RX ADMIN — Medication 250 MG: at 08:40

## 2024-04-12 RX ADMIN — MOMETASONE FUROATE AND FORMOTEROL FUMARATE DIHYDRATE 2 PUFF: 200; 5 AEROSOL RESPIRATORY (INHALATION) at 07:44

## 2024-04-12 RX ADMIN — HALOPERIDOL LACTATE 2 MG: 5 INJECTION, SOLUTION INTRAMUSCULAR at 08:40

## 2024-04-12 RX ADMIN — METFORMIN HYDROCHLORIDE 500 MG: 500 TABLET ORAL at 17:05

## 2024-04-12 RX ADMIN — LISINOPRIL 20 MG: 20 TABLET ORAL at 08:40

## 2024-04-12 RX ADMIN — ATORVASTATIN CALCIUM 80 MG: 80 TABLET, FILM COATED ORAL at 20:42

## 2024-04-12 RX ADMIN — SODIUM CHLORIDE, PRESERVATIVE FREE 10 ML: 5 INJECTION INTRAVENOUS at 20:43

## 2024-04-12 RX ADMIN — HALOPERIDOL LACTATE 2 MG: 5 INJECTION, SOLUTION INTRAMUSCULAR at 19:07

## 2024-04-12 RX ADMIN — FERROUS SULFATE TAB 325 MG (65 MG ELEMENTAL FE) 325 MG: 325 (65 FE) TAB at 08:40

## 2024-04-12 RX ADMIN — APIXABAN 5 MG: 5 TABLET, FILM COATED ORAL at 20:42

## 2024-04-12 RX ADMIN — INSULIN GLARGINE 16 UNITS: 100 INJECTION, SOLUTION SUBCUTANEOUS at 20:42

## 2024-04-12 RX ADMIN — INSULIN LISPRO 3 UNITS: 100 INJECTION, SOLUTION INTRAVENOUS; SUBCUTANEOUS at 17:05

## 2024-04-12 RX ADMIN — APIXABAN 5 MG: 5 TABLET, FILM COATED ORAL at 08:40

## 2024-04-12 RX ADMIN — DONEPEZIL HYDROCHLORIDE 5 MG: 5 TABLET, FILM COATED ORAL at 20:42

## 2024-04-12 RX ADMIN — Medication 250 MCG: at 08:40

## 2024-04-12 RX ADMIN — METOPROLOL TARTRATE 25 MG: 25 TABLET, FILM COATED ORAL at 08:40

## 2024-04-12 RX ADMIN — OLANZAPINE 10 MG: 5 TABLET, FILM COATED ORAL at 17:05

## 2024-04-12 RX ADMIN — LORAZEPAM 0.5 MG: 2 INJECTION INTRAMUSCULAR; INTRAVENOUS at 19:19

## 2024-04-12 RX ADMIN — INSULIN LISPRO 2 UNITS: 100 INJECTION, SOLUTION INTRAVENOUS; SUBCUTANEOUS at 13:10

## 2024-04-12 RX ADMIN — LORAZEPAM 1 MG: 2 INJECTION INTRAMUSCULAR; INTRAVENOUS at 23:56

## 2024-04-12 RX ADMIN — METOPROLOL TARTRATE 25 MG: 25 TABLET, FILM COATED ORAL at 20:42

## 2024-04-12 ASSESSMENT — PAIN SCALES - GENERAL
PAINLEVEL_OUTOF10: 0
PAINLEVEL_OUTOF10: 0

## 2024-04-12 NOTE — PROGRESS NOTES
Neurosurgery Progress Note     Date: 4/12/2024   Patient:  El Leo  YOB: 1945  Age: 78 y.o.  MRN: [id number]      Reason for Consult:  Possible NPH     Chief Complaint:   Chief Complaint   Patient presents with    Altered Mental Status    Hallucinations       Subjective     HPI -  78 y.o. male who presented to [facility] on 4/7/24 with increasing confusion, aggressive behavior behavior, hallucinations and frequent falls.  He was also noted to have an unsteady gait and urinary incontinence.  Patient notes that he has been having increased difficulty with balance over the last year, but it should be noted that he has been having some confusion which makes him an unreliable historian.  His daughter notes recent increase in temper and aggression recently. She notes that patient's father also had a from of dementia that caused similar symptoms.     Interval History -     Review of Systems   Review of Systems   Genitourinary:  Positive for frequency and urgency.   Skin:  Negative for wound.   Neurological:  Negative for headaches.   Psychiatric/Behavioral:  Positive for agitation, behavioral problems and confusion.      \  Medications     Current Facility-Administered Medications:     OLANZapine (ZYPREXA) tablet 10 mg, 10 mg, Oral, Nightly, Mayte Guzman MD, 10 mg at 04/11/24 2022    insulin glargine (LANTUS) injection vial 12 Units, 12 Units, SubCUTAneous, Nightly, Mayte Guzman MD, 12 Units at 04/11/24 2020    donepezil (ARICEPT) tablet 5 mg, 5 mg, Oral, Nightly, Mayte Guzman MD, 5 mg at 04/11/24 2021    metFORMIN (GLUCOPHAGE) tablet 500 mg, 500 mg, Oral, BID WC, Mayte Guzman MD, 500 mg at 04/12/24 0840    sodium chloride flush 0.9 % injection 5-40 mL, 5-40 mL, IntraVENous, 2 times per day, Mayte Guzman MD, 10 mL at 04/11/24 2022    sodium chloride flush 0.9 % injection 5-40 mL, 5-40 mL, IntraVENous, PRN, Mayte Guzman MD    0.9 % sodium chloride infusion, ,

## 2024-04-12 NOTE — PROGRESS NOTES
Primary Report received by RN      Physical Assessment    HEAD:  Pt. Alert and oriented to person and time, not to place or situation  Pt. Says they have no pain  Capillary refill less than 3 sec  Pupils round equal and react to light     UPPER EXTREMITIES:  Pt. Has no numbness or tingling  Movement is present  Skin pink warm and dry  Arm drift negative    CHEST  Heart ausculted in all four quadrants   Heart rate normal rhythm, plus 2 amplitude   Radial pulse is 92  Lung sounds clear throughout, chest movement symmetrical, 14 respirations per minute    ABDOMEN  Abdomen round  Bowel sounds are hyperactive and heard x4  No masses felt, pt. Denies and tenderness while palpating    LOWER EXTREMITIES  Pink dry and warm  Movement purposeful   Denies numbness or tingling  Grade one edema  Pedal pulses palpated    POSTERIOR  Skin over posterior dry and intact  Movement purposeful     VITALS  Temp: 98.6  Pulse: 92  BP: 126/78  O2: 95  RR: 14    Pt. In chair, call light is in reach and chair alarm is on.

## 2024-04-12 NOTE — PROCEDURES
EEG REPORT       Patient: El Leo Age: 78 y.o.  MRN: [id number]      Referring Provider: No ref. provider found    History: This routine 30 minute scalp EEG was recorded with video- monitoring for a 78 y.o.. male who presented with encephalopathy. This EEG was performed to evaluate for focal and epileptiform abnormalities.     El Leo   Current Facility-Administered Medications   Medication Dose Route Frequency Provider Last Rate Last Admin    OLANZapine (ZYPREXA) tablet 10 mg  10 mg Oral Nightly Mayte Guzman MD   10 mg at 04/11/24 2022    insulin glargine (LANTUS) injection vial 12 Units  12 Units SubCUTAneous Nightly Mayte Guzman MD   12 Units at 04/11/24 2020    donepezil (ARICEPT) tablet 5 mg  5 mg Oral Nightly Mayte Guzman MD   5 mg at 04/11/24 2021    metFORMIN (GLUCOPHAGE) tablet 500 mg  500 mg Oral BID WC Mayte Guzman MD   500 mg at 04/10/24 1826    sodium chloride flush 0.9 % injection 5-40 mL  5-40 mL IntraVENous 2 times per day Mayte Guzman MD   10 mL at 04/11/24 2022    sodium chloride flush 0.9 % injection 5-40 mL  5-40 mL IntraVENous PRN Mayte Guzman MD        0.9 % sodium chloride infusion   IntraVENous PRN Mayte Guzman MD        potassium chloride (KLOR-CON M) extended release tablet 40 mEq  40 mEq Oral PRN Mayte Guzman MD        Or    potassium bicarb-citric acid (EFFER-K) effervescent tablet 40 mEq  40 mEq Oral PRN Mayte Guzman MD        Or    potassium chloride 10 mEq/100 mL IVPB (Peripheral Line)  10 mEq IntraVENous PRN Mayte Guzman  mL/hr at 04/08/24 1150 10 mEq at 04/08/24 1150    magnesium sulfate 2000 mg in 50 mL IVPB premix  2,000 mg IntraVENous PRN Mayte Guzman MD   Stopped at 04/08/24 0648    ondansetron (ZOFRAN-ODT) disintegrating tablet 4 mg  4 mg Oral Q8H PRN Mayte Guzman MD        Or    ondansetron (ZOFRAN) injection 4 mg  4 mg IntraVENous Q6H PRN Mayte Guzman MD        polyethylene glycol (GLYCOLAX) packet 17 g  17 g Oral Daily

## 2024-04-12 NOTE — PLAN OF CARE
Problem: Skin/Tissue Integrity  Goal: Absence of new skin breakdown  Description: 1.  Monitor for areas of redness and/or skin breakdown  2.  Assess vascular access sites hourly  3.  Every 4-6 hours minimum:  Change oxygen saturation probe site  4.  Every 4-6 hours:  If on nasal continuous positive airway pressure, respiratory therapy assess nares and determine need for appliance change or resting period.  Outcome: Progressing     Problem: ABCDS Injury Assessment  Goal: Absence of physical injury  Outcome: Progressing     Problem: Safety - Adult  Goal: Free from fall injury  Outcome: Progressing     Problem: Safety - Medical Restraint  Goal: Remains free of injury from restraints (Restraint for Interference with Medical Device)  Description: INTERVENTIONS:  1. Determine that other, less restrictive measures have been tried or would not be effective before applying the restraint  2. Evaluate the patient's condition at the time of restraint application  3. Inform patient/family regarding the reason for restraint  4. Q2H: Monitor safety, psychosocial status, comfort, nutrition and hydration  Outcome: Progressing     Problem: Chronic Conditions and Co-morbidities  Goal: Patient's chronic conditions and co-morbidity symptoms are monitored and maintained or improved  Outcome: Progressing     Problem: Discharge Planning  Goal: Discharge to home or other facility with appropriate resources  Outcome: Progressing     Problem: Nutrition Deficit:  Goal: Optimize nutritional status  Outcome: Progressing     Problem: Confusion  Goal: Confusion, delirium, dementia, or psychosis is improved or at baseline  Description: INTERVENTIONS:  1. Assess for possible contributors to thought disturbance, including medications, impaired vision or hearing, underlying metabolic abnormalities, dehydration, psychiatric diagnoses, and notify attending LIP  2. [place] high risk fall precautions, as indicated  3. Provide frequent short contacts  to provide reality reorientation, refocusing and direction  4. Decrease environmental stimuli, including noise as appropriate  5. Monitor and intervene to maintain adequate nutrition, hydration, elimination, sleep and activity  6. If unable to ensure safety without constant attention obtain sitter and review sitter guidelines with assigned personnel  7. Initiate Psychosocial CNS and Spiritual Care consult, as indicated  Outcome: Progressing     Problem: Respiratory - Adult  Goal: Clear lung sounds  Outcome: Progressing     Problem: Pain  Goal: Verbalizes/displays adequate comfort level or baseline comfort level  Outcome: Progressing     Problem: Neurosensory - Adult  Goal: Achieves stable or improved neurological status  Outcome: Progressing     Problem: Skin/Tissue Integrity - Adult  Goal: Incisions, wounds, or drain sites healing without S/S of infection  Outcome: Progressing     Problem: Musculoskeletal - Adult  Goal: Return mobility to safest level of function  Outcome: Progressing     Problem: Gastrointestinal - Adult  Goal: Maintains adequate nutritional intake  Outcome: Progressing     Problem: Genitourinary - Adult  Goal: Absence of urinary retention  Outcome: Progressing  Care plan reviewed with patient.  Patient verbalizes understanding of the plan of care and contributes to goal setting.

## 2024-04-12 NOTE — PROGRESS NOTES
INTERNAL MEDICINE Progress Note  4/12/2024 3:22 PM  Subjective:   Admit Date: 4/6/2024  PCP: Main Caldwell C, DO  Interval History:     calm, sitting out of bed  on Olanzapine  Alert and oriented to person, year    Objective:   Vitals: BP (!) 145/87   Pulse 87   Temp 98.2 °F (36.8 °C) (Oral)   Resp 16   Ht 1.778 m (5' 10\")   Wt 108.7 kg (239 lb 9.6 oz)   SpO2 95%   BMI 34.38 kg/m²   General appearance: alert and cooperative with exam  HEENT: atraumatic  Neck: no adenopathy, no carotid bruit, no JVD, and supple, symmetrical, trachea midline  Lungs: clear to auscultation bilaterally  Heart: regular rate and rhythm and S1, S2 normal  Abdomen: soft, non-tender; bowel sounds normal; no masses,  no organomegaly  Extremities: extremities normal, atraumatic, no cyanosis or edema  Neurologic: Alert, oriented, thought content inappropriate      Medications:   Scheduled Meds:   OLANZapine  10 mg Oral Nightly    insulin glargine  12 Units SubCUTAneous Nightly    donepezil  5 mg Oral Nightly    metFORMIN  500 mg Oral BID WC    sodium chloride flush  5-40 mL IntraVENous 2 times per day    metoprolol tartrate  25 mg Oral BID    apixaban  5 mg Oral BID    insulin lispro  0-4 Units SubCUTAneous TID WC    insulin lispro  0-4 Units SubCUTAneous Nightly    atorvastatin  80 mg Oral Nightly    mometasone-formoterol  2 puff Inhalation BID RT    cyanocobalamin  250 mcg Oral Daily    lisinopril  20 mg Oral Daily    magnesium oxide  250 mg Oral Daily    ferrous sulfate  325 mg Oral Daily with breakfast    potassium bicarb-citric acid  40 mEq Oral Once     Continuous Infusions:   sodium chloride      dextrose         Lab Results:   CBC:   Recent Labs     04/10/24  1445   WBC 10.0   HGB 14.5          BMP:    Recent Labs     04/10/24  1445 04/12/24  0531    144   K 3.7 3.5    104   CO2 23 27   BUN 16 18   CREATININE 0.8 0.8   GLUCOSE 205* 170*         Magnesium:    Lab Results   Component Value Date/Time    MG 1.9

## 2024-04-12 NOTE — PROGRESS NOTES
Physician Progress Note      PATIENT:               FAIZA TIAN  [facility] #:                  [id number]  :                       1945  ADMIT DATE:       2024 9:08 PM  DISCH DATE:  RESPONDING  PROVIDER #:        Mayte Guzman MD          QUERY TEXT:    Patient admitted with falls and AMS, noted to have chronic atrial fibrillation   and is maintained on Eliquis. If possible, please document in progress notes   and discharge summary if you are evaluating and/or treating any of the   following:    The medical record reflects the following:  Risk Factors: Elderly, DM  Clinical Indicators: Chronic afib on Eliquis  Treatment: lab monitoring, medication management    Thank You!  Melida Ghotra RN, CRCR  RN Clinical Documentation Integrity  Options provided:  -- Secondary hypercoagulable state in a patient with atrial fibrillation  -- Other - I will add my own diagnosis  -- Disagree - Not applicable / Not valid  -- Disagree - Clinically unable to determine / Unknown  -- Refer to Clinical Documentation Reviewer    PROVIDER RESPONSE TEXT:    This patient has secondary hypercoagulable state in a patient with atrial   fibrillation.    Query created by: Melida Ghotra on 2024 10:40 AM      Electronically signed by:  Mayte Guzman MD 2024 4:16 PM

## 2024-04-12 NOTE — CARE COORDINATION
4/12/24, 2:49 PM EDT    DISCHARGE PLANNING EVALUATION     Spoke with Krystle at [facility] and they will review on Monday to determine if patient is appropriate for their unit.

## 2024-04-12 NOTE — CONSULTS
Department of Psychiatry   Psychiatric Assessment      Thank you very much for allowing us to participate in the care of this patient.      Reason for Consult: Confusion and agitation    HISTORY OF PRESENT ILLNESS:    Patient is an 78-year-old male with no significant psychiatric history but concerns for significant neurocognitive decline presented for frequent falls, confusion and visual hallucinations.  We attempted to see the patient yesterday however he was very somnolent after receiving emergency meds.  This morning patient is still not oriented to time place person or situation.  Reports that everything is coming down on him.  Very restless on the bed.  Attempting to get up multiple times during the conversation.  Reviewed records and per family he has been seeing people and animals in the room and also having multiple falls.    PSYCHIATRIC HISTORY:  Could not assess from the patient due to his current mentation        Prior to Admission medications    Medication Sig Start Date End Date Taking? Authorizing Provider   fluticasone (FLONASE) 50 MCG/ACT nasal spray 1 spray by Each Nostril route daily as needed for Rhinitis or Allergies    Brian Clifton MD   omeprazole (PRILOSEC) 20 MG delayed release capsule Take 1 capsule by mouth daily    Brian Clifton MD   vitamin D (VITAMIN D3) 50 MCG (2000 UT) CAPS capsule Take 1 capsule by mouth daily    Brian Clifton MD   insulin glargine (BASAGLAR KWIKPEN) 100 UNIT/ML injection pen Inject 30 units before breakfast and 20-25 units at bedtime.    Brian Clifton MD   budesonide-formoterol (SYMBICORT) 160-4.5 MCG/ACT AERO Inhale 2 puffs into the lungs 2 times daily    Brian Clifton MD   insulin lispro, 1 Unit Dial, (HUMALOG KWIKPEN) 100 UNIT/ML SOPN Using sliding scale with breakfast and dinner.    Brian Clifton MD   metoprolol tartrate (LOPRESSOR) 50 MG tablet Take 1 tablet by mouth 2 times daily    Brian Clifton MD

## 2024-04-12 NOTE — PLAN OF CARE
Problem: Skin/Tissue Integrity  Goal: Absence of new skin breakdown  Description: 1.  Monitor for areas of redness and/or skin breakdown  2.  Assess vascular access sites hourly  3.  Every 4-6 hours minimum:  Change oxygen saturation probe site  4.  Every 4-6 hours:  If on nasal continuous positive airway pressure, respiratory therapy assess nares and determine need for appliance change or resting period.  4/11/2024 2241 by Starla Singleton RN  Outcome: Progressing     Problem: ABCDS Injury Assessment  Goal: Absence of physical injury  4/11/2024 2241 by Starla Singleton RN  Outcome: Progressing     Problem: Safety - Adult  Goal: Free from fall injury  4/11/2024 2241 by Starla Singleton RN  Outcome: Progressing   Fall assessment completed. Patient using call light appropriately to call for assistance with ambulation to bathroom.  Personal items within reach. Patient is also compliant with use of non-skid slippers.  Tele sitter at bedside,    Problem: Chronic Conditions and Co-morbidities  Goal: Patient's chronic conditions and co-morbidity symptoms are monitored and maintained or improved  4/11/2024 2241 by Starla Singleton RN  Outcome: Progressing  Flowsheets (Taken 4/11/2024 2201)  Care Plan - Patient's Chronic Conditions and Co-Morbidity Symptoms are Monitored and Maintained or Improved:   Monitor and assess patient's chronic conditions and comorbid symptoms for stability, deterioration, or improvement   Collaborate with multidisciplinary team to address chronic and comorbid conditions and prevent exacerbation or deterioration   Update acute care plan with appropriate goals if chronic or comorbid symptoms are exacerbated and prevent overall improvement and discharge     Problem: Discharge Planning  Goal: Discharge to home or other facility with appropriate resources  4/11/2024 2241 by Starla Singleton RN  Outcome: Progressing  Flowsheets (Taken 4/11/2024 2201)  Discharge to home or other facility

## 2024-04-13 LAB
GLUCOSE BLD STRIP.AUTO-MCNC: 122 MG/DL (ref 70–108)
GLUCOSE BLD STRIP.AUTO-MCNC: 134 MG/DL (ref 70–108)
GLUCOSE BLD STRIP.AUTO-MCNC: 135 MG/DL (ref 70–108)
GLUCOSE BLD STRIP.AUTO-MCNC: 147 MG/DL (ref 70–108)
GLUCOSE BLD STRIP.AUTO-MCNC: 170 MG/DL (ref 70–108)

## 2024-04-13 PROCEDURE — 6360000002 HC RX W HCPCS: Performed by: INTERNAL MEDICINE

## 2024-04-13 PROCEDURE — 51798 US URINE CAPACITY MEASURE: CPT

## 2024-04-13 PROCEDURE — 6370000000 HC RX 637 (ALT 250 FOR IP): Performed by: INTERNAL MEDICINE

## 2024-04-13 PROCEDURE — 2580000003 HC RX 258: Performed by: INTERNAL MEDICINE

## 2024-04-13 PROCEDURE — 99232 SBSQ HOSP IP/OBS MODERATE 35: CPT | Performed by: NEUROLOGICAL SURGERY

## 2024-04-13 PROCEDURE — 94640 AIRWAY INHALATION TREATMENT: CPT

## 2024-04-13 PROCEDURE — 1200000003 HC TELEMETRY R&B

## 2024-04-13 PROCEDURE — 93005 ELECTROCARDIOGRAM TRACING: CPT | Performed by: INTERNAL MEDICINE

## 2024-04-13 PROCEDURE — 82948 REAGENT STRIP/BLOOD GLUCOSE: CPT

## 2024-04-13 RX ADMIN — ATORVASTATIN CALCIUM 80 MG: 80 TABLET, FILM COATED ORAL at 19:34

## 2024-04-13 RX ADMIN — SODIUM CHLORIDE, PRESERVATIVE FREE 10 ML: 5 INJECTION INTRAVENOUS at 19:34

## 2024-04-13 RX ADMIN — SODIUM CHLORIDE, PRESERVATIVE FREE 10 ML: 5 INJECTION INTRAVENOUS at 09:00

## 2024-04-13 RX ADMIN — MOMETASONE FUROATE AND FORMOTEROL FUMARATE DIHYDRATE 2 PUFF: 200; 5 AEROSOL RESPIRATORY (INHALATION) at 18:03

## 2024-04-13 RX ADMIN — DONEPEZIL HYDROCHLORIDE 5 MG: 5 TABLET, FILM COATED ORAL at 19:34

## 2024-04-13 RX ADMIN — OLANZAPINE 10 MG: 5 TABLET, FILM COATED ORAL at 19:25

## 2024-04-13 RX ADMIN — METOPROLOL TARTRATE 25 MG: 25 TABLET, FILM COATED ORAL at 19:34

## 2024-04-13 RX ADMIN — HALOPERIDOL LACTATE 2 MG: 5 INJECTION, SOLUTION INTRAMUSCULAR at 00:44

## 2024-04-13 NOTE — PROGRESS NOTES
incontinence.  Patient notes that he has been having increased difficulty with balance over the last year, but it should be noted that he has been having some confusion which makes him an unreliable historian.  His daughter notes recent increase in temper and aggression recently. She notes that patient's father also had a from of dementia that caused similar symptoms.     Interval History -   4/13/24: Patient seen and evaluated with daughter at bedside. No acute events overnight.     Review of Systems   Review of Systems   Genitourinary:  Positive for frequency and urgency.   Skin:  Negative for wound.   Neurological:  Negative for headaches.   Psychiatric/Behavioral:  Positive for agitation, behavioral problems and confusion.      Medications     Current Facility-Administered Medications:     OLANZapine (ZyPREXA) 5 mg in sterile water 1 mL injection, 5 mg, IntraMUSCular, BID PRN, Mayte Guzman MD    insulin glargine (LANTUS) injection vial 16 Units, 16 Units, SubCUTAneous, Nightly, Mayte Guzman MD, 16 Units at 04/12/24 2042    OLANZapine (ZYPREXA) tablet 10 mg, 10 mg, Oral, Nightly, Mayte Guzman MD, 10 mg at 04/12/24 1705    donepezil (ARICEPT) tablet 5 mg, 5 mg, Oral, Nightly, Mayte Guzman MD, 5 mg at 04/12/24 2042    metFORMIN (GLUCOPHAGE) tablet 500 mg, 500 mg, Oral, BID WC, Mayte Guzman MD, 500 mg at 04/12/24 1705    sodium chloride flush 0.9 % injection 5-40 mL, 5-40 mL, IntraVENous, 2 times per day, Mayte Guzman MD, 10 mL at 04/12/24 2043    sodium chloride flush 0.9 % injection 5-40 mL, 5-40 mL, IntraVENous, PRN, Mayte Guzman MD    0.9 % sodium chloride infusion, , IntraVENous, PRN, Mayte Guzman MD    potassium chloride (KLOR-CON M) extended release tablet 40 mEq, 40 mEq, Oral, PRN **OR** potassium bicarb-citric acid (EFFER-K) effervescent tablet 40 mEq, 40 mEq, Oral, PRN **OR** potassium chloride 10 mEq/100 mL IVPB (Peripheral Line), 10 mEq, IntraVENous, PRN, Myate Guzman MD, Last  may contain minor errors which are inherent in voice recognition technology.**      Final report electronically signed by DR REINALDO GALVEZ on 4/8/2024 2:49 PM      XR CHEST 1 VIEW   Final Result   Impression:   Cardiomegaly, no CHF.      Stable pleural calcifications.      This document has been electronically signed by: Solomon Zee MD on    04/06/2024 10:50 PM      CT HEAD WO CONTRAST   Final Result   Impression:   Mild nonspecific periventricular and deep white matter disease.      This document has been electronically signed by: Solomon Zee MD on    04/06/2024 10:51 PM      All CTs at this facility use dose modulation techniques and iterative    reconstructions, and/or weight-based dosing   when appropriate to reduce radiation to a low as reasonably achievable.      CT CERVICAL SPINE WO CONTRAST   Final Result   Degenerative changes within the cervical spine. No fractures.      This document has been electronically signed by: Solomon Zee MD on    04/06/2024 10:49 PM      All CTs at this facility use dose modulation techniques and iterative    reconstructions, and/or weight-based dosing   when appropriate to reduce radiation to a low as reasonably achievable.      ]    Assessment and Plan:        Active Hospital Problems    Diagnosis Date Noted    Altered mental status [R41.82] 04/11/2024    Delirium [R41.0] 04/06/2024     Suspected NPH       PLAN:  - Patient's daughter spoke with patient's wife regarding the ultimate decision of NPH trial. At this time they do not wish to proceed with lumbar drain trial. I advised them to reach out with further questions.   - We discussed that if they change their mind we can follow up on an OP basis, but otherwise no follow up from neurosurgery is required.  - Neurosurgery will see this patient only as needed while he remains at [facility].  Please reach out with any further questions or concerns.    This case was discussed with Dr. Johnson and he is in agreement with

## 2024-04-13 NOTE — PROGRESS NOTES
Rounding complete    Pt. Sitting in chair watching tv, pt. Did not want to get back in bed.     Call light within reach. Chair alarm on.

## 2024-04-13 NOTE — PROCEDURES
Bladder scan completed at 7:20pm and results told to RN. Scan showed 450 mL in the patients bladder. Sondra

## 2024-04-13 NOTE — FLOWSHEET NOTE
04/12/24 6417   Treatment Team Notification   Reason for Communication Evaluate  (Doctor, this patient is aggressive, removing IV and diaper and confuse at the moment. He received the PRN dose of Lorazepam and Haldol at 7pm. It will be due at 1am. Would you mind to order a one time dose Doctor to calm him down. Please advised)   Name of Team Member Notified Mayte Guzman   Treatment Team Role Attending Provider   Method of Communication Secure Message   Response See orders   Notification Time 6331

## 2024-04-13 NOTE — PLAN OF CARE
Provide frequent short contacts to provide reality reorientation, refocusing and direction   Decrease environmental stimuli, including noise as appropriate   Monitor and intervene to maintain adequate nutrition, hydration, elimination, sleep and activity     Problem: Respiratory - Adult  Goal: Clear lung sounds  4/12/2024 2210 by Starla Singleton RN  Outcome: Progressing     Problem: Pain  Goal: Verbalizes/displays adequate comfort level or baseline comfort level  4/12/2024 2210 by Starla Singleton RN  Outcome: Progressing  Flowsheets (Taken 4/12/2024 2030)  Verbalizes/displays adequate comfort level or baseline comfort level:   Encourage patient to monitor pain and request assistance   Assess pain using appropriate pain scale   Administer analgesics based on type and severity of pain and evaluate response   Implement non-pharmacological measures as appropriate and evaluate response   Notify Licensed Independent Practitioner if interventions unsuccessful or patient reports new pain     Problem: Neurosensory - Adult  Goal: Achieves stable or improved neurological status  4/12/2024 2210 by Starla Singleton RN  Outcome: Progressing  Flowsheets (Taken 4/12/2024 2130)  Achieves stable or improved neurological status:   Assess for and report changes in neurological status   Maintain blood pressure and fluid volume within ordered parameters to optimize cerebral perfusion and minimize risk of hemorrhage   Initiate measures to prevent increased intracranial pressure   Monitor temperature, glucose, and sodium. Initiate appropriate interventions as ordered     Problem: Skin/Tissue Integrity - Adult  Goal: Incisions, wounds, or drain sites healing without S/S of infection  4/12/2024 2210 by Starla Singleton RN  Outcome: Progressing  Flowsheets (Taken 4/12/2024 2130)  Incisions, Wounds, or Drain Sites Healing Without Sign and Symptoms of Infection:   TWICE DAILY: Assess and document skin integrity   TWICE DAILY:

## 2024-04-13 NOTE — PROGRESS NOTES
INTERNAL MEDICINE Progress Note  4/13/2024 11:12 AM  Subjective:   Admit Date: 4/6/2024  PCP: Main Caldwell, DO  Interval History:     Somnolent, received ativan last night for severe agitation  on Olanzapine      Objective:   Vitals: /69   Pulse 62   Temp 97.7 °F (36.5 °C) (Axillary)   Resp 18   Ht 1.778 m (5' 10\")   Wt 108.7 kg (239 lb 9.6 oz)   SpO2 94%   BMI 34.38 kg/m²   General appearance: somnolent, responds to stimulation  HEENT: atraumatic  Neck: no adenopathy, no carotid bruit, no JVD, and supple, symmetrical, trachea midline  Lungs: clear to auscultation bilaterally  Heart: regular rate and rhythm and S1, S2 normal  Abdomen: soft, non-tender; bowel sounds normal; no masses,  no organomegaly  Extremities: extremities normal, atraumatic, no cyanosis or edema  Neurologic: somnolent,       Medications:   Scheduled Meds:   insulin glargine  16 Units SubCUTAneous Nightly    OLANZapine  10 mg Oral Nightly    donepezil  5 mg Oral Nightly    metFORMIN  500 mg Oral BID WC    sodium chloride flush  5-40 mL IntraVENous 2 times per day    metoprolol tartrate  25 mg Oral BID    apixaban  5 mg Oral BID    insulin lispro  0-4 Units SubCUTAneous TID WC    insulin lispro  0-4 Units SubCUTAneous Nightly    atorvastatin  80 mg Oral Nightly    mometasone-formoterol  2 puff Inhalation BID RT    cyanocobalamin  250 mcg Oral Daily    lisinopril  20 mg Oral Daily    magnesium oxide  250 mg Oral Daily    ferrous sulfate  325 mg Oral Daily with breakfast    potassium bicarb-citric acid  40 mEq Oral Once     Continuous Infusions:   sodium chloride      dextrose         Lab Results:   CBC:   Recent Labs     04/10/24  1445   WBC 10.0   HGB 14.5          BMP:    Recent Labs     04/10/24  1445 04/12/24  0531    144   K 3.7 3.5    104   CO2 23 27   BUN 16 18   CREATININE 0.8 0.8   GLUCOSE 205* 170*         Magnesium:    Lab Results   Component Value Date/Time    MG 1.9 04/09/2024 05:01 AM     HgBA1c:     Lab Results   Component Value Date/Time    LABA1C 8.0 04/08/2024 03:32 AM     TSH:    Lab Results   Component Value Date/Time    TSH 1.300 04/08/2024 03:32 AM     FOLATE:    Lab Results   Component Value Date/Time    FOLATE 14.3 04/08/2024 03:32 AM     MRI BRAIN WO CONTRAST   CLINICAL INFORMATION AMS.   COMPARISON: CT scan of the brain dated 4/6/2024..     TECHNIQUE: Multiplanar and multiple spin echo MRI images were obtained of the brain without contrast.     FINDINGS:   The diffusion-weighted images are normal.  The brain volume is reduced. There is a mild amount of signal hyperintensity on the FLAIR and T2-weighted sequences in the white matter of the brain. This is consistent with mild severity chronic small vessel   ischemic changes.     There are no intra-or extra-axial collections.  There is mild dilatation of the third and lateral ventricles. There is no  midline shift or mass effect.     There are no areas of susceptibility artifact noted to. The major intracranial vascular flow voids are present.     There is some soft tissue thickening behind the dens..  The pituitary gland and brainstem are normal.     There is increased signal intensity in ethmoid air cells and mastoid air cells bilaterally consistent with inflammatory changes.   The patient is status post bilateral lens removal.   IMPRESSION:   1. Mild atrophy and dilatation of the third and lateral ventricles.  2. Probable ischemic changes in the white matter. No evidence for an acute infarct.  3. Mild inflammatory changes in ethmoid air cells and mastoid air cells.  4. Pannus formation surrounding the dens.       Assessment and Plan:   AMS / hallucinations  Confusion in the setting of memory loss and urinary incontinence,   -neurosurgery consulted for ?NPH, considered lumbar drain trial-daughter undecided  Dementia with behavioral disturbance,     -cont aricept, zyprexa   -dc BZP  HTN  DM 2  Chronic a fib, with tova, hold BB for

## 2024-04-14 LAB
GLUCOSE BLD STRIP.AUTO-MCNC: 186 MG/DL (ref 70–108)
GLUCOSE BLD STRIP.AUTO-MCNC: 241 MG/DL (ref 70–108)
GLUCOSE BLD STRIP.AUTO-MCNC: 243 MG/DL (ref 70–108)
GLUCOSE BLD STRIP.AUTO-MCNC: 269 MG/DL (ref 70–108)

## 2024-04-14 PROCEDURE — 1200000003 HC TELEMETRY R&B

## 2024-04-14 PROCEDURE — 93010 ELECTROCARDIOGRAM REPORT: CPT | Performed by: NUCLEAR MEDICINE

## 2024-04-14 PROCEDURE — 82948 REAGENT STRIP/BLOOD GLUCOSE: CPT

## 2024-04-14 PROCEDURE — 94640 AIRWAY INHALATION TREATMENT: CPT

## 2024-04-14 PROCEDURE — 6370000000 HC RX 637 (ALT 250 FOR IP): Performed by: INTERNAL MEDICINE

## 2024-04-14 PROCEDURE — 2580000003 HC RX 258: Performed by: INTERNAL MEDICINE

## 2024-04-14 RX ADMIN — DONEPEZIL HYDROCHLORIDE 5 MG: 5 TABLET, FILM COATED ORAL at 20:58

## 2024-04-14 RX ADMIN — LISINOPRIL 20 MG: 20 TABLET ORAL at 09:46

## 2024-04-14 RX ADMIN — METFORMIN HYDROCHLORIDE 500 MG: 500 TABLET ORAL at 09:45

## 2024-04-14 RX ADMIN — METFORMIN HYDROCHLORIDE 500 MG: 500 TABLET ORAL at 18:43

## 2024-04-14 RX ADMIN — SODIUM CHLORIDE, PRESERVATIVE FREE 10 ML: 5 INJECTION INTRAVENOUS at 09:45

## 2024-04-14 RX ADMIN — ATORVASTATIN CALCIUM 80 MG: 80 TABLET, FILM COATED ORAL at 20:58

## 2024-04-14 RX ADMIN — FERROUS SULFATE TAB 325 MG (65 MG ELEMENTAL FE) 325 MG: 325 (65 FE) TAB at 09:45

## 2024-04-14 RX ADMIN — MOMETASONE FUROATE AND FORMOTEROL FUMARATE DIHYDRATE 2 PUFF: 200; 5 AEROSOL RESPIRATORY (INHALATION) at 08:13

## 2024-04-14 RX ADMIN — SODIUM CHLORIDE, PRESERVATIVE FREE 10 ML: 5 INJECTION INTRAVENOUS at 20:59

## 2024-04-14 RX ADMIN — METOPROLOL TARTRATE 12.5 MG: 25 TABLET, FILM COATED ORAL at 20:58

## 2024-04-14 RX ADMIN — INSULIN LISPRO 1 UNITS: 100 INJECTION, SOLUTION INTRAVENOUS; SUBCUTANEOUS at 13:30

## 2024-04-14 RX ADMIN — INSULIN GLARGINE 16 UNITS: 100 INJECTION, SOLUTION SUBCUTANEOUS at 20:59

## 2024-04-14 RX ADMIN — OLANZAPINE 10 MG: 5 TABLET, FILM COATED ORAL at 18:37

## 2024-04-14 RX ADMIN — Medication 250 MCG: at 09:45

## 2024-04-14 RX ADMIN — INSULIN LISPRO 2 UNITS: 100 INJECTION, SOLUTION INTRAVENOUS; SUBCUTANEOUS at 18:37

## 2024-04-14 RX ADMIN — MOMETASONE FUROATE AND FORMOTEROL FUMARATE DIHYDRATE 2 PUFF: 200; 5 AEROSOL RESPIRATORY (INHALATION) at 17:32

## 2024-04-14 RX ADMIN — Medication 250 MG: at 09:46

## 2024-04-14 RX ADMIN — APIXABAN 5 MG: 5 TABLET, FILM COATED ORAL at 20:58

## 2024-04-14 NOTE — PLAN OF CARE
Problem: Skin/Tissue Integrity  Goal: Absence of new skin breakdown  Description: 1.  Monitor for areas of redness and/or skin breakdown  2.  Assess vascular access sites hourly  3.  Every 4-6 hours minimum:  Change oxygen saturation probe site  4.  Every 4-6 hours:  If on nasal continuous positive airway pressure, respiratory therapy assess nares and determine need for appliance change or resting period.  Outcome: Progressing     Problem: ABCDS Injury Assessment  Goal: Absence of physical injury  Outcome: Progressing     Problem: Safety - Adult  Goal: Free from fall injury  Outcome: Progressing   All fall precautions in place. Bed in low position, alarm activated and appropriate use of call light.     Problem: Safety - Medical Restraint  Goal: Remains free of injury from restraints (Restraint for Interference with Medical Device)  Description: INTERVENTIONS:  1. Determine that other, less restrictive measures have been tried or would not be effective before applying the restraint  2. Evaluate the patient's condition at the time of restraint application  3. Inform patient/family regarding the reason for restraint  4. Q2H: Monitor safety, psychosocial status, comfort, nutrition and hydration  Outcome: Progressing     Problem: Chronic Conditions and Co-morbidities  Goal: Patient's chronic conditions and co-morbidity symptoms are monitored and maintained or improved  Outcome: Progressing  Care Plan - Patient's Chronic Conditions and Co-Morbidity Symptoms are Monitored and Maintained or Improved: Monitor and assess patient's chronic conditions and comorbid symptoms for stability, deterioration, or improvement     Problem: Discharge Planning  Goal: Discharge to home or other facility with appropriate resources  Outcome: Progressing  Discharge to home or other facility with appropriate resources: Identify barriers to discharge with patient and caregiver     Problem: Nutrition Deficit:  Goal: Optimize nutritional

## 2024-04-14 NOTE — PROGRESS NOTES
INTERNAL MEDICINE Progress Note  4/14/2024 2:03 PM  Subjective:   Admit Date: 4/6/2024  PCP: Main Caldwell, DO  Interval History:     Alert  Sitting out of bed, feeding himself  Family decided against lumbar spine drainage trial for NPH  on Olanzapine      Objective:   Vitals: /73   Pulse 80   Temp 98.4 °F (36.9 °C) (Oral)   Resp 16   Ht 1.778 m (5' 10\")   Wt 108.7 kg (239 lb 9.6 oz)   SpO2 94%   BMI 34.38 kg/m²   General appearance: somnolent, responds to stimulation  HEENT: atraumatic  Neck: no adenopathy, no carotid bruit, no JVD, and supple, symmetrical, trachea midline  Lungs: clear to auscultation bilaterally  Heart: regular rate and rhythm and S1, S2 normal  Abdomen: soft, non-tender; bowel sounds normal; no masses,  no organomegaly  Extremities: extremities normal, atraumatic, no cyanosis or edema  Neurologic: somnolent,       Medications:   Scheduled Meds:   insulin glargine  16 Units SubCUTAneous Nightly    OLANZapine  10 mg Oral Nightly    donepezil  5 mg Oral Nightly    metFORMIN  500 mg Oral BID WC    sodium chloride flush  5-40 mL IntraVENous 2 times per day    metoprolol tartrate  25 mg Oral BID    [Held by provider] apixaban  5 mg Oral BID    insulin lispro  0-4 Units SubCUTAneous TID WC    insulin lispro  0-4 Units SubCUTAneous Nightly    atorvastatin  80 mg Oral Nightly    mometasone-formoterol  2 puff Inhalation BID RT    cyanocobalamin  250 mcg Oral Daily    lisinopril  20 mg Oral Daily    magnesium oxide  250 mg Oral Daily    ferrous sulfate  325 mg Oral Daily with breakfast    potassium bicarb-citric acid  40 mEq Oral Once     Continuous Infusions:   sodium chloride      dextrose         Lab Results:   CBC:   No results for input(s): \"WBC\", \"HGB\", \"PLT\" in the last 72 hours.    BMP:    Recent Labs     04/12/24  0531      K 3.5      CO2 27   BUN 18   CREATININE 0.8   GLUCOSE 170*         Magnesium:    Lab Results   Component Value Date/Time    MG 1.9 04/09/2024

## 2024-04-15 LAB
GLUCOSE BLD STRIP.AUTO-MCNC: 185 MG/DL (ref 70–108)
GLUCOSE BLD STRIP.AUTO-MCNC: 228 MG/DL (ref 70–108)
GLUCOSE BLD STRIP.AUTO-MCNC: 258 MG/DL (ref 70–108)

## 2024-04-15 PROCEDURE — 6370000000 HC RX 637 (ALT 250 FOR IP): Performed by: INTERNAL MEDICINE

## 2024-04-15 PROCEDURE — 1200000003 HC TELEMETRY R&B

## 2024-04-15 PROCEDURE — 6360000002 HC RX W HCPCS: Performed by: INTERNAL MEDICINE

## 2024-04-15 PROCEDURE — 94640 AIRWAY INHALATION TREATMENT: CPT

## 2024-04-15 PROCEDURE — 82948 REAGENT STRIP/BLOOD GLUCOSE: CPT

## 2024-04-15 PROCEDURE — 2580000003 HC RX 258: Performed by: INTERNAL MEDICINE

## 2024-04-15 RX ORDER — INSULIN GLARGINE 100 [IU]/ML
20 INJECTION, SOLUTION SUBCUTANEOUS NIGHTLY
Status: DISCONTINUED | OUTPATIENT
Start: 2024-04-15 | End: 2024-04-18 | Stop reason: HOSPADM

## 2024-04-15 RX ADMIN — WATER 5 MG: 1 INJECTION INTRAMUSCULAR; INTRAVENOUS; SUBCUTANEOUS at 15:35

## 2024-04-15 RX ADMIN — ATORVASTATIN CALCIUM 80 MG: 80 TABLET, FILM COATED ORAL at 23:02

## 2024-04-15 RX ADMIN — Medication 250 MG: at 09:52

## 2024-04-15 RX ADMIN — Medication 250 MCG: at 09:52

## 2024-04-15 RX ADMIN — APIXABAN 5 MG: 5 TABLET, FILM COATED ORAL at 09:52

## 2024-04-15 RX ADMIN — SODIUM CHLORIDE, PRESERVATIVE FREE 10 ML: 5 INJECTION INTRAVENOUS at 23:03

## 2024-04-15 RX ADMIN — INSULIN GLARGINE 20 UNITS: 100 INJECTION, SOLUTION SUBCUTANEOUS at 23:03

## 2024-04-15 RX ADMIN — FERROUS SULFATE TAB 325 MG (65 MG ELEMENTAL FE) 325 MG: 325 (65 FE) TAB at 09:52

## 2024-04-15 RX ADMIN — OLANZAPINE 10 MG: 5 TABLET, FILM COATED ORAL at 18:35

## 2024-04-15 RX ADMIN — MOMETASONE FUROATE AND FORMOTEROL FUMARATE DIHYDRATE 2 PUFF: 200; 5 AEROSOL RESPIRATORY (INHALATION) at 17:46

## 2024-04-15 RX ADMIN — DONEPEZIL HYDROCHLORIDE 5 MG: 5 TABLET, FILM COATED ORAL at 23:05

## 2024-04-15 RX ADMIN — METFORMIN HYDROCHLORIDE 500 MG: 500 TABLET ORAL at 09:52

## 2024-04-15 RX ADMIN — APIXABAN 5 MG: 5 TABLET, FILM COATED ORAL at 23:02

## 2024-04-15 RX ADMIN — METOPROLOL TARTRATE 12.5 MG: 25 TABLET, FILM COATED ORAL at 10:05

## 2024-04-15 RX ADMIN — SODIUM CHLORIDE, PRESERVATIVE FREE 10 ML: 5 INJECTION INTRAVENOUS at 09:52

## 2024-04-15 RX ADMIN — LISINOPRIL 20 MG: 20 TABLET ORAL at 09:52

## 2024-04-15 RX ADMIN — METOPROLOL TARTRATE 12.5 MG: 25 TABLET, FILM COATED ORAL at 23:02

## 2024-04-15 RX ADMIN — INSULIN LISPRO 1 UNITS: 100 INJECTION, SOLUTION INTRAVENOUS; SUBCUTANEOUS at 12:01

## 2024-04-15 RX ADMIN — MOMETASONE FUROATE AND FORMOTEROL FUMARATE DIHYDRATE 2 PUFF: 200; 5 AEROSOL RESPIRATORY (INHALATION) at 09:07

## 2024-04-15 NOTE — PLAN OF CARE
Problem: Respiratory - Adult  Goal: Clear lung sounds  4/15/2024 1011 by Mine Eldridge, RCP  Outcome: Progressing  Continue therapy as ordered to achieve and maintain clear breath sounds and improve aeration.

## 2024-04-15 NOTE — CARE COORDINATION
4/15/24, 2:56 PM EDT    DISCHARGE ON GOING EVALUATION    [facility] day: 9  Location: [facility]20/020-A Reason for admit: Hypokalemia [E87.6]  Delirium [R41.0]  Chronic atrial fibrillation (HCC) [I48.20]  Frequent falls [R29.6]  Altered mental status, unspecified altered mental status type [R41.82]     Procedures: none    Imaging since last note: none    Barriers to Discharge: IM, Neurosurgery and Psychiatry following. Restraints discontinued 4/11. Mentation and behaviors improved today per nurse. Telesitter remains at bedside. DM management.     PCP: Main Caldwell DO  Readmission Risk Score: 14.6%    Patient Goals/Plan/Treatment Preferences: From home w/ wife. Referral to [facility]. [facility] to do on site visit 4/16. SW following.

## 2024-04-15 NOTE — CARE COORDINATION
4/15/24, 10:02 AM EDT    DISCHARGE PLANNING EVALUATION    Call to Krystle with [facility]. They will do on site visit tomorrow morning to determine if they can accept pt.     2:42 PM EDT  Lizbeth met with pt, daughter, and wife at bedside. LIZBETH updated on [facility] coming to visit pt tomorrow. Daughter and wife did talk with pt about going to [facility], pt agreeable to plans, states he knows he can't go home right now.     Daughter reports they have not been given a confirmation number from Medicaid application, she will call [facility] to get more information on this.

## 2024-04-15 NOTE — PROGRESS NOTES
INTERNAL MEDICINE Progress Note  4/15/2024 7:09 PM  Subjective:   Admit Date: 4/6/2024  PCP: Main Caldwell, DO  Interval History:     Pt with periodic agitation, calms down quickly  on Olanzapine  Tolerating orally    Objective:   Vitals: /73   Pulse 74   Temp 97.4 °F (36.3 °C) (Oral)   Resp 18   Ht 1.778 m (5' 10\")   Wt 108.7 kg (239 lb 9.6 oz)   SpO2 98%   BMI 34.38 kg/m²   General appearance: alert  HEENT: atraumatic  Neck: no adenopathy, no carotid bruit, no JVD, and supple, symmetrical, trachea midline  Lungs: clear to auscultation bilaterally  Heart: regular rate and rhythm and S1, S2 normal  Abdomen: soft, non-tender; bowel sounds normal; no masses,  no organomegaly  Extremities: extremities normal, atraumatic, no cyanosis or edema  Neurologic: alert, oriented to person, thought content inappropriate / tangential      Medications:   Scheduled Meds:   metoprolol tartrate  12.5 mg Oral BID    insulin glargine  16 Units SubCUTAneous Nightly    OLANZapine  10 mg Oral Nightly    donepezil  5 mg Oral Nightly    metFORMIN  500 mg Oral BID WC    sodium chloride flush  5-40 mL IntraVENous 2 times per day    apixaban  5 mg Oral BID    insulin lispro  0-4 Units SubCUTAneous TID WC    insulin lispro  0-4 Units SubCUTAneous Nightly    atorvastatin  80 mg Oral Nightly    mometasone-formoterol  2 puff Inhalation BID RT    cyanocobalamin  250 mcg Oral Daily    lisinopril  20 mg Oral Daily    magnesium oxide  250 mg Oral Daily    ferrous sulfate  325 mg Oral Daily with breakfast    potassium bicarb-citric acid  40 mEq Oral Once     Continuous Infusions:   sodium chloride      dextrose         Lab Results:   CBC:   No results for input(s): \"WBC\", \"HGB\", \"PLT\" in the last 72 hours.    BMP:    No results for input(s): \"NA\", \"K\", \"CL\", \"CO2\", \"BUN\", \"CREATININE\", \"GLUCOSE\" in the last 72 hours.      Magnesium:    Lab Results   Component Value Date/Time    MG 1.9 04/09/2024 05:01 AM     HgBA1c:    Lab Results  bradycardia  hypokalemia    plan   Cont Zyprexa, Aricept  Fall precautions.  eliquis   lantus   eliquis.  SNF eval ongoing.    Mayte Guzman MD, MD

## 2024-04-15 NOTE — PLAN OF CARE
Problem: Skin/Tissue Integrity  Goal: Absence of new skin breakdown  Description: 1.  Monitor for areas of redness and/or skin breakdown  2.  Assess vascular access sites hourly  Outcome: Progressing     Problem: ABCDS Injury Assessment  Goal: Absence of physical injury  Outcome: Progressing     Problem: Safety - Adult  Goal: Free from fall injury  Outcome: Progressing   Fall assessment completed. Patient using call light appropriately to call for assistance with ambulation to bathroom.  Personal items within reach. Patient is also compliant with use of non-skid slippers.      Problem: Chronic Conditions and Co-morbidities  Goal: Patient's chronic conditions and co-morbidity symptoms are monitored and maintained or improved  Outcome: Progressing  Flowsheets (Taken 4/15/2024 0012)  Care Plan - Patient's Chronic Conditions and Co-Morbidity Symptoms are Monitored and Maintained or Improved:   Monitor and assess patient's chronic conditions and comorbid symptoms for stability, deterioration, or improvement   Collaborate with multidisciplinary team to address chronic and comorbid conditions and prevent exacerbation or deterioration   Update acute care plan with appropriate goals if chronic or comorbid symptoms are exacerbated and prevent overall improvement and discharge     Problem: Discharge Planning  Goal: Discharge to home or other facility with appropriate resources  Outcome: Progressing  Flowsheets (Taken 4/15/2024 0012)  Discharge to home or other facility with appropriate resources:   Identify barriers to discharge with patient and caregiver   Arrange for needed discharge resources and transportation as appropriate   Identify discharge learning needs (meds, wound care, etc)   Refer to discharge planning if patient needs post-hospital services based on physician order or complex needs related to functional status, cognitive ability or social support system   Discharge plan is home. Awaiting for further discharge

## 2024-04-15 NOTE — PROGRESS NOTES
Patient became increasingly confused at 1520 - attempted to crawl out of bed and becoming verbally aggressive and agitated. PRN zyprexa IM administered. Patient then fell back asleep and remained calm until 1745. Patient woke up and this RN entered room - patient stating he needed to get up to use the bathroom. This RN along with Remington Santana RN attempted to get patient up via sera steady to bathroom. Patient became increasingly agitated, refusing to use sera steady. Patient became verbally aggressive and impulsive - refusing to follow directions. This RN and Remington Santana RN were able to get patient back into bed laying down. Patient then soiled the bed. This RN along with Remington Santana RN and patient care tech attempted to clean patient up and perform whole linen change and brief change. Patient still agitated and refusing to comply. Patient then started swinging in the air in a way that appeared like he was trying to hit staff. Staff then backed away, placed a new gown on patient and covered patient up, bed alarm on and call light in reach in an attempt to calm patient down.    This RN then perfect served THOMAS BOBO in regards to situation. Thomas BOBO arrived at bedside and spoke with patient. This RN entered room and patient now compliant and in good spirits. Patient allowed this RN and Remington Santana RN to get patient up safely to the toilet via sera steady. Patient had large BM and urinated. Patient brief was changed and the rest of the bed change was completed from prior and patient was safely assisted back to bed. Patient was then sat up and left to eat dinner. Patient bed alarm on and call light in reach. Scheduled zyprexa oral pill was administered.

## 2024-04-16 LAB
ANION GAP SERPL CALC-SCNC: 11 MEQ/L (ref 8–16)
BUN SERPL-MCNC: 21 MG/DL (ref 7–22)
CALCIUM SERPL-MCNC: 8.4 MG/DL (ref 8.5–10.5)
CHLORIDE SERPL-SCNC: 104 MEQ/L (ref 98–111)
CO2 SERPL-SCNC: 26 MEQ/L (ref 23–33)
CREAT SERPL-MCNC: 0.8 MG/DL (ref 0.4–1.2)
DEPRECATED RDW RBC AUTO: 46 FL (ref 35–45)
ERYTHROCYTE [DISTWIDTH] IN BLOOD BY AUTOMATED COUNT: 13.1 % (ref 11.5–14.5)
GFR SERPL CREATININE-BSD FRML MDRD: 89 ML/MIN/1.73M2
GLUCOSE BLD STRIP.AUTO-MCNC: 166 MG/DL (ref 70–108)
GLUCOSE BLD STRIP.AUTO-MCNC: 201 MG/DL (ref 70–108)
GLUCOSE BLD STRIP.AUTO-MCNC: 227 MG/DL (ref 70–108)
GLUCOSE BLD STRIP.AUTO-MCNC: 250 MG/DL (ref 70–108)
GLUCOSE SERPL-MCNC: 166 MG/DL (ref 70–108)
HCT VFR BLD AUTO: 39 % (ref 42–52)
HGB BLD-MCNC: 12.6 GM/DL (ref 14–18)
MCH RBC QN AUTO: 30.9 PG (ref 26–33)
MCHC RBC AUTO-ENTMCNC: 32.3 GM/DL (ref 32.2–35.5)
MCV RBC AUTO: 95.6 FL (ref 80–94)
PLATELET # BLD AUTO: 291 THOU/MM3 (ref 130–400)
PMV BLD AUTO: 9.7 FL (ref 9.4–12.4)
POTASSIUM SERPL-SCNC: 3.7 MEQ/L (ref 3.5–5.2)
RBC # BLD AUTO: 4.08 MILL/MM3 (ref 4.7–6.1)
SODIUM SERPL-SCNC: 141 MEQ/L (ref 135–145)
WBC # BLD AUTO: 8.7 THOU/MM3 (ref 4.8–10.8)

## 2024-04-16 PROCEDURE — 94640 AIRWAY INHALATION TREATMENT: CPT

## 2024-04-16 PROCEDURE — 6370000000 HC RX 637 (ALT 250 FOR IP): Performed by: INTERNAL MEDICINE

## 2024-04-16 PROCEDURE — 36415 COLL VENOUS BLD VENIPUNCTURE: CPT

## 2024-04-16 PROCEDURE — 82948 REAGENT STRIP/BLOOD GLUCOSE: CPT

## 2024-04-16 PROCEDURE — 1200000003 HC TELEMETRY R&B

## 2024-04-16 PROCEDURE — 2580000003 HC RX 258: Performed by: INTERNAL MEDICINE

## 2024-04-16 PROCEDURE — 85027 COMPLETE CBC AUTOMATED: CPT

## 2024-04-16 PROCEDURE — 80048 BASIC METABOLIC PNL TOTAL CA: CPT

## 2024-04-16 RX ADMIN — INSULIN GLARGINE 20 UNITS: 100 INJECTION, SOLUTION SUBCUTANEOUS at 20:39

## 2024-04-16 RX ADMIN — MOMETASONE FUROATE AND FORMOTEROL FUMARATE DIHYDRATE 2 PUFF: 200; 5 AEROSOL RESPIRATORY (INHALATION) at 17:10

## 2024-04-16 RX ADMIN — SODIUM CHLORIDE, PRESERVATIVE FREE 10 ML: 5 INJECTION INTRAVENOUS at 11:15

## 2024-04-16 RX ADMIN — Medication 250 MG: at 11:13

## 2024-04-16 RX ADMIN — METFORMIN HYDROCHLORIDE 500 MG: 500 TABLET ORAL at 17:33

## 2024-04-16 RX ADMIN — SODIUM CHLORIDE, PRESERVATIVE FREE 10 ML: 5 INJECTION INTRAVENOUS at 19:38

## 2024-04-16 RX ADMIN — APIXABAN 5 MG: 5 TABLET, FILM COATED ORAL at 20:39

## 2024-04-16 RX ADMIN — MOMETASONE FUROATE AND FORMOTEROL FUMARATE DIHYDRATE 2 PUFF: 200; 5 AEROSOL RESPIRATORY (INHALATION) at 07:42

## 2024-04-16 RX ADMIN — FERROUS SULFATE TAB 325 MG (65 MG ELEMENTAL FE) 325 MG: 325 (65 FE) TAB at 11:13

## 2024-04-16 RX ADMIN — METFORMIN HYDROCHLORIDE 500 MG: 500 TABLET ORAL at 11:13

## 2024-04-16 RX ADMIN — LISINOPRIL 20 MG: 20 TABLET ORAL at 11:13

## 2024-04-16 RX ADMIN — DONEPEZIL HYDROCHLORIDE 5 MG: 5 TABLET, FILM COATED ORAL at 19:38

## 2024-04-16 RX ADMIN — OLANZAPINE 10 MG: 5 TABLET, FILM COATED ORAL at 19:37

## 2024-04-16 RX ADMIN — INSULIN LISPRO 1 UNITS: 100 INJECTION, SOLUTION INTRAVENOUS; SUBCUTANEOUS at 12:44

## 2024-04-16 RX ADMIN — INSULIN LISPRO 1 UNITS: 100 INJECTION, SOLUTION INTRAVENOUS; SUBCUTANEOUS at 17:32

## 2024-04-16 RX ADMIN — ATORVASTATIN CALCIUM 80 MG: 80 TABLET, FILM COATED ORAL at 19:38

## 2024-04-16 RX ADMIN — Medication 250 MCG: at 11:13

## 2024-04-16 RX ADMIN — APIXABAN 5 MG: 5 TABLET, FILM COATED ORAL at 11:13

## 2024-04-16 NOTE — PROGRESS NOTES
INTERNAL MEDICINE Progress Note  4/16/2024 5:29 PM  Subjective:   Admit Date: 4/6/2024  PCP: Main Caldwell, DO  Interval History:     Alert, calm  on Olanzapine  Tolerating orally    Objective:   Vitals: /67   Pulse 60   Temp 98.3 °F (36.8 °C) (Oral)   Resp 18   Ht 1.778 m (5' 10\")   Wt 102.1 kg (225 lb)   SpO2 95%   BMI 32.28 kg/m²   General appearance: alert  HEENT: atraumatic  Neck: no adenopathy, no carotid bruit, no JVD, and supple, symmetrical, trachea midline  Lungs: clear to auscultation bilaterally  Heart: regular rate and rhythm and S1, S2 normal  Abdomen: soft, non-tender; bowel sounds normal; no masses,  no organomegaly  Extremities: extremities normal, atraumatic, no cyanosis or edema  Neurologic: alert, oriented to person, thought content appropriate      Medications:   Scheduled Meds:   insulin glargine  20 Units SubCUTAneous Nightly    metoprolol tartrate  12.5 mg Oral BID    OLANZapine  10 mg Oral Nightly    donepezil  5 mg Oral Nightly    metFORMIN  500 mg Oral BID WC    sodium chloride flush  5-40 mL IntraVENous 2 times per day    apixaban  5 mg Oral BID    insulin lispro  0-4 Units SubCUTAneous TID WC    insulin lispro  0-4 Units SubCUTAneous Nightly    atorvastatin  80 mg Oral Nightly    mometasone-formoterol  2 puff Inhalation BID RT    cyanocobalamin  250 mcg Oral Daily    lisinopril  20 mg Oral Daily    magnesium oxide  250 mg Oral Daily    ferrous sulfate  325 mg Oral Daily with breakfast    potassium bicarb-citric acid  40 mEq Oral Once     Continuous Infusions:   sodium chloride      dextrose         Lab Results:   CBC:   Recent Labs     04/16/24  0536   WBC 8.7   HGB 12.6*          BMP:    Recent Labs     04/16/24  0536      K 3.7      CO2 26   BUN 21   CREATININE 0.8   GLUCOSE 166*         Magnesium:    Lab Results   Component Value Date/Time    MG 1.9 04/09/2024 05:01 AM     HgBA1c:    Lab Results   Component Value Date/Time    LABA1C 8.0 04/08/2024

## 2024-04-16 NOTE — CARE COORDINATION
4/16/24, 1:31 PM EDT    DISCHARGE PLANNING EVALUATION     LIZBETH spoke with Martha this morning with [facility], she will be up this morning. Lizbeth did call daughter and updated on estimated time Martha would be visiting.

## 2024-04-16 NOTE — PLAN OF CARE
Problem: Skin/Tissue Integrity  Goal: Absence of new skin breakdown  Description: 1.  Monitor for areas of redness and/or skin breakdown  2.  Assess vascular access sites hourly  3.  Every 4-6 hours minimum:  Change oxygen saturation probe site  4.  Every 4-6 hours:  If on nasal continuous positive airway pressure, respiratory therapy assess nares and determine need for appliance change or resting period.  Outcome: Progressing  Note: Patient encouraged to reposition      Problem: ABCDS Injury Assessment  Goal: Absence of physical injury  Outcome: Progressing  Flowsheets (Taken 4/10/2024 0032 by Prachi Ryan, RN)  Absence of Physical Injury: Implement safety measures based on patient assessment     Problem: Safety - Adult  Goal: Free from fall injury  Outcome: Progressing  Flowsheets (Taken 4/9/2024 1245 by Blnaca Giraldo, RN)  Free From Fall Injury:   Instruct family/caregiver on patient safety   Based on caregiver fall risk screen, instruct family/caregiver to ask for assistance with transferring infant if caregiver noted to have fall risk factors     Problem: Safety - Medical Restraint  Goal: Remains free of injury from restraints (Restraint for Interference with Medical Device)  Description: INTERVENTIONS:  1. Determine that other, less restrictive measures have been tried or would not be effective before applying the restraint  2. Evaluate the patient's condition at the time of restraint application  3. Inform patient/family regarding the reason for restraint  4. Q2H: Monitor safety, psychosocial status, comfort, nutrition and hydration  Outcome: Completed     Problem: Chronic Conditions and Co-morbidities  Goal: Patient's chronic conditions and co-morbidity symptoms are monitored and maintained or improved  Outcome: Progressing  Flowsheets (Taken 4/16/2024 0800)  Care Plan - Patient's Chronic Conditions and Co-Morbidity Symptoms are Monitored and Maintained or Improved: Monitor and assess patient's  dementia, or psychosis) are managed with adequate functional status: Assess for contributors to thought disturbance, including medications, impaired vision or hearing, underlying metabolic abnormalities, dehydration, psychiatric diagnoses, notify LIP     Problem: Respiratory - Adult  Goal: Clear lung sounds  Outcome: Progressing  Note: Cough and deep breathe     Problem: Neurosensory - Adult  Goal: Achieves stable or improved neurological status  Outcome: Progressing  Flowsheets (Taken 4/16/2024 0800)  Achieves stable or improved neurological status: Assess for and report changes in neurological status     Problem: Skin/Tissue Integrity - Adult  Goal: Incisions, wounds, or drain sites healing without S/S of infection  Outcome: Progressing  Flowsheets (Taken 4/16/2024 0800)  Incisions, Wounds, or Drain Sites Healing Without Sign and Symptoms of Infection: ADMISSION and DAILY: Assess and document risk factors for pressure ulcer development     Problem: Musculoskeletal - Adult  Goal: Return mobility to safest level of function  Outcome: Progressing  Flowsheets (Taken 4/16/2024 0800)  Return Mobility to Safest Level of Function:   Assess patient stability and activity tolerance for standing, transferring and ambulating with or without assistive devices   Assist with transfers and ambulation using safe patient handling equipment as needed     Problem: Gastrointestinal - Adult  Goal: Maintains adequate nutritional intake  Outcome: Progressing  Flowsheets (Taken 4/16/2024 0800)  Maintains adequate nutritional intake:   Monitor percentage of each meal consumed   Identify factors contributing to decreased intake, treat as appropriate   Assist with meals as needed     Problem: Genitourinary - Adult  Goal: Absence of urinary retention  Outcome: Progressing  Flowsheets (Taken 4/16/2024 0800)  Absence of urinary retention:   Assess patient’s ability to void and empty bladder   Monitor intake/output and perform bladder scan as

## 2024-04-16 NOTE — PROGRESS NOTES
Date    LABA1C 8.0 (H) 04/08/2024    LABA1C 8.0 (H) 04/03/2024     Recent Labs     04/16/24  0536      K 3.7      GLUCOSE 166*   BUN 21   CREATININE 0.8     Medications: insulin, metoprolol, metformin, satin, lisinopril, iron    Current Nutrition Intake & Therapies:    Recent PO intake: 51-75%  Recent Supplement Intake: Unable to assess (patient did not answer questions- seemed confused)    - Current intake likely does not meet estimated needs.   ADULT DIET; Regular  ADULT ORAL NUTRITION SUPPLEMENT; Breakfast, Dinner; Wound Healing Oral Supplement  ADULT ORAL NUTRITION SUPPLEMENT; Lunch; Diabetic Oral Supplement    Anthropometric Measures:  Height: 177.8 cm (5' 10\")  Ideal Body Weight (IBW): 166 lbs  Admission Body Jose: 108.7 kg (239 lb 9.6 oz) (4/6/24 bedscale, no edema)  Current Body Weight: 108.4 kg (239 lb) (4/6, asked RN for updated weight)  BMI: Body mass index is 34.38 kg/m².  BMI Classification: Class I Obesity (30-34.9)     Wt Readings from Last 10 Encounters:   04/06/24 108.7 kg (239 lb 9.6 oz)   04/07/24 108.7 kg (239 lb 10.2 oz)   04/03/24 108 kg (238 lb)   03/23/24 115.7 kg (255 lb)   12/12/23 115.7 kg (255 lb)     Estimated Daily Nutrient Needs:  Energy (kcal/day): 109 kg  Weight Used for energy calculation: ~ 0146-9484 kcals (15-18 kcals/kg/day)   Protein (g/day): ~  grams (1.2-2 grams/kg IBW/day)    Weight Used for protein calculation: 75.5 kg      Nutrition Diagnosis:   Increased nutrient needs related to increase demand for energy/nutrients as evidenced by wounds    Nutrition Interventions:   Nutrition and/ or Nutrient Delivery: Continue Current Diet, Continue Oral Nutrition Supplement   Nutrition Education/ Counseling: Education not appropriate   Coordination of Nutrition Care: Continue to monitor while inpatient     Goals:  Goals: Meet at least 75% of estimated needs, by next RD assessment     Nutrition Monitoring and Evaluation:   Food/ Nutrient Intake Outcomes: Food and  Nutrient Intake, Supplement Intake   Physical Sings/ Symptoms Outcomes: Biochemical Data, GI Status, Fluid Status or Edema, Hemodynamic Status, Nutrition Focused Physical Findings, Skin, Weight     Discharge Planning:    Continue Oral Nutrition Supplement      Hodan Patterson MS, RD, LD  Contact: [phone]

## 2024-04-16 NOTE — PROGRESS NOTES
04/16/24 1645   Encounter Summary   Encounter Overview/Reason  Spiritual/Emotional Needs   Service Provided For: Patient   Referral/Consult From: Rounding   Support System Spouse;Children   Last Encounter  04/16/24   Complexity of Encounter Moderate   Begin Time 1635   End Time  1645   Total Time Calculated 10 min   Spiritual/Emotional needs   Type Spiritual Support   Assessment/Intervention/Outcome   Assessment Coping   Intervention Nurtured Hope;Prayer (assurance of)/[place];Sustaining Presence/Ministry of presence   Outcome Coping     Assessment:  In my encounter with the 78 yr old patient, while rounding the unit 5K,  I provided spiritual care to patient through conversation, I also came to assess the patient's spiritual needs present. The pt was admitted due to delirium.     Interventions:  I provided prayer, emotional support and words of comfort. The pt wasn't too receptive.     Outcomes:  The patient didn't share any further spiritual needs at this time.     Plan:  Chaplains will follow-up at a later time for assessment of any spiritual care needs present.

## 2024-04-17 PROBLEM — F02.B11 MODERATE LATE ONSET ALZHEIMER'S DEMENTIA WITH AGITATION (HCC): Status: ACTIVE | Noted: 2024-04-17

## 2024-04-17 PROBLEM — G30.1 MODERATE LATE ONSET ALZHEIMER'S DEMENTIA WITH AGITATION (HCC): Status: ACTIVE | Noted: 2024-04-17

## 2024-04-17 LAB
EKG Q-T INTERVAL: 482 MS
EKG QRS DURATION: 148 MS
EKG QTC CALCULATION (BAZETT): 426 MS
EKG R AXIS: -78 DEGREES
EKG T AXIS: -14 DEGREES
EKG VENTRICULAR RATE: 47 BPM
GLUCOSE BLD STRIP.AUTO-MCNC: 148 MG/DL (ref 70–108)
GLUCOSE BLD STRIP.AUTO-MCNC: 201 MG/DL (ref 70–108)
GLUCOSE BLD STRIP.AUTO-MCNC: 224 MG/DL (ref 70–108)
GLUCOSE BLD STRIP.AUTO-MCNC: 253 MG/DL (ref 70–108)

## 2024-04-17 PROCEDURE — 2580000003 HC RX 258: Performed by: INTERNAL MEDICINE

## 2024-04-17 PROCEDURE — 94761 N-INVAS EAR/PLS OXIMETRY MLT: CPT

## 2024-04-17 PROCEDURE — 97530 THERAPEUTIC ACTIVITIES: CPT

## 2024-04-17 PROCEDURE — 1200000003 HC TELEMETRY R&B

## 2024-04-17 PROCEDURE — 82948 REAGENT STRIP/BLOOD GLUCOSE: CPT

## 2024-04-17 PROCEDURE — 94640 AIRWAY INHALATION TREATMENT: CPT

## 2024-04-17 PROCEDURE — 97116 GAIT TRAINING THERAPY: CPT

## 2024-04-17 PROCEDURE — 97110 THERAPEUTIC EXERCISES: CPT

## 2024-04-17 PROCEDURE — 6370000000 HC RX 637 (ALT 250 FOR IP): Performed by: INTERNAL MEDICINE

## 2024-04-17 RX ORDER — INSULIN LISPRO 100 [IU]/ML
0-4 INJECTION, SOLUTION INTRAVENOUS; SUBCUTANEOUS NIGHTLY
DISCHARGE
Start: 2024-04-17

## 2024-04-17 RX ORDER — INSULIN GLARGINE 100 [IU]/ML
20 INJECTION, SOLUTION SUBCUTANEOUS 2 TIMES DAILY
Qty: 5 ADJUSTABLE DOSE PRE-FILLED PEN SYRINGE | Refills: 3 | DISCHARGE
Start: 2024-04-17

## 2024-04-17 RX ORDER — INSULIN LISPRO 100 [IU]/ML
0-4 INJECTION, SOLUTION INTRAVENOUS; SUBCUTANEOUS
DISCHARGE
Start: 2024-04-17

## 2024-04-17 RX ORDER — AMLODIPINE BESYLATE 5 MG/1
5 TABLET ORAL DAILY
Qty: 30 TABLET | Refills: 3 | DISCHARGE
Start: 2024-04-17

## 2024-04-17 RX ORDER — OLANZAPINE 10 MG/1
10 TABLET ORAL NIGHTLY
Qty: 30 TABLET | Refills: 3 | DISCHARGE
Start: 2024-04-17

## 2024-04-17 RX ORDER — AMLODIPINE BESYLATE 5 MG/1
5 TABLET ORAL DAILY
Status: DISCONTINUED | OUTPATIENT
Start: 2024-04-17 | End: 2024-04-18 | Stop reason: HOSPADM

## 2024-04-17 RX ORDER — DONEPEZIL HYDROCHLORIDE 5 MG/1
5 TABLET, FILM COATED ORAL NIGHTLY
Qty: 30 TABLET | Refills: 3 | DISCHARGE
Start: 2024-04-17

## 2024-04-17 RX ADMIN — Medication 250 MCG: at 09:15

## 2024-04-17 RX ADMIN — Medication 250 MG: at 12:06

## 2024-04-17 RX ADMIN — APIXABAN 5 MG: 5 TABLET, FILM COATED ORAL at 09:16

## 2024-04-17 RX ADMIN — METOPROLOL TARTRATE 12.5 MG: 25 TABLET, FILM COATED ORAL at 15:20

## 2024-04-17 RX ADMIN — OLANZAPINE 10 MG: 5 TABLET, FILM COATED ORAL at 20:33

## 2024-04-17 RX ADMIN — INSULIN GLARGINE 20 UNITS: 100 INJECTION, SOLUTION SUBCUTANEOUS at 20:33

## 2024-04-17 RX ADMIN — APIXABAN 5 MG: 5 TABLET, FILM COATED ORAL at 20:32

## 2024-04-17 RX ADMIN — INSULIN LISPRO 1 UNITS: 100 INJECTION, SOLUTION INTRAVENOUS; SUBCUTANEOUS at 18:11

## 2024-04-17 RX ADMIN — AMLODIPINE BESYLATE 5 MG: 5 TABLET ORAL at 20:41

## 2024-04-17 RX ADMIN — LISINOPRIL 20 MG: 20 TABLET ORAL at 09:16

## 2024-04-17 RX ADMIN — FERROUS SULFATE TAB 325 MG (65 MG ELEMENTAL FE) 325 MG: 325 (65 FE) TAB at 09:16

## 2024-04-17 RX ADMIN — SODIUM CHLORIDE, PRESERVATIVE FREE 10 ML: 5 INJECTION INTRAVENOUS at 20:33

## 2024-04-17 RX ADMIN — DONEPEZIL HYDROCHLORIDE 5 MG: 5 TABLET, FILM COATED ORAL at 20:32

## 2024-04-17 RX ADMIN — INSULIN LISPRO 2 UNITS: 100 INJECTION, SOLUTION INTRAVENOUS; SUBCUTANEOUS at 12:05

## 2024-04-17 RX ADMIN — METFORMIN HYDROCHLORIDE 500 MG: 500 TABLET ORAL at 17:23

## 2024-04-17 RX ADMIN — ATORVASTATIN CALCIUM 80 MG: 80 TABLET, FILM COATED ORAL at 20:32

## 2024-04-17 RX ADMIN — MOMETASONE FUROATE AND FORMOTEROL FUMARATE DIHYDRATE 2 PUFF: 200; 5 AEROSOL RESPIRATORY (INHALATION) at 18:02

## 2024-04-17 RX ADMIN — SODIUM CHLORIDE, PRESERVATIVE FREE 10 ML: 5 INJECTION INTRAVENOUS at 09:19

## 2024-04-17 RX ADMIN — MOMETASONE FUROATE AND FORMOTEROL FUMARATE DIHYDRATE 2 PUFF: 200; 5 AEROSOL RESPIRATORY (INHALATION) at 08:04

## 2024-04-17 RX ADMIN — METFORMIN HYDROCHLORIDE 500 MG: 500 TABLET ORAL at 09:17

## 2024-04-17 ASSESSMENT — PAIN SCALES - WONG BAKER: WONGBAKER_NUMERICALRESPONSE: NO HURT

## 2024-04-17 ASSESSMENT — PAIN SCALES - GENERAL
PAINLEVEL_OUTOF10: 0
PAINLEVEL_OUTOF10: 0

## 2024-04-17 NOTE — PLAN OF CARE
Problem: Skin/Tissue Integrity  Goal: Absence of new skin breakdown  Description: 1.  Monitor for areas of redness and/or skin breakdown  2.  Assess vascular access sites hourly  3.  Every 4-6 hours minimum:  Change oxygen saturation probe site  4.  Every 4-6 hours:  If on nasal continuous positive airway pressure, respiratory therapy assess nares and determine need for appliance change or resting period.  Outcome: Progressing     Problem: ABCDS Injury Assessment  Goal: Absence of physical injury  Outcome: Progressing     Problem: Safety - Adult  Goal: Free from fall injury  Outcome: Progressing  Flowsheets (Taken 4/17/2024 7179)  Free From Fall Injury: Instruct family/    Care plan reviewed with patient and family. Patient and family verbalize understanding of the plan of care and contribute to goal setting.  caregiver on patient safety

## 2024-04-17 NOTE — PROGRESS NOTES
[facility]  PHYSICAL THERAPY MISSED TREATMENT NOTE  STRZ ONC MED 5K    Date: 2024  Patient Name: El Leo        MRN: [id number]   : 1945  (78 y.o.)  Gender: male   Referring Practitioner: Mayte Guzman MD  Diagnosis: Hypokalemia         REASON FOR MISSED TREATMENT:  RN approves treatment. Upon entering room patient eating breakfast. Will check back later or next available date

## 2024-04-17 NOTE — PROGRESS NOTES
[facility]  STRZ ONC MED 5K  Occupational Therapy  Daily Note  Time:   Time In: 1003  Time Out: 1041  Timed Code Treatment Minutes: 38 Minutes  Minutes: 38          Date: 2024  Patient Name: El Leo,   Gender: male      Room: [facility]20/020-A  MRN: [id number]  : 1945  (78 y.o.)  Referring Practitioner: Mayte Guzman MD  Diagnosis: hypokalemia  Additional Pertinent Hx: Per EMR: The patient is a 78 y.o. male who presents with  increasing confusion, aggressive behavior, hallucinations, falls at home.  He was relocated to [place] by daughter last September cos of confusion noted by family. He lost his 's License following an accident.  He has been forgetful and recently more aggressive towards his wife. He has visual hallucinations, unsteady gait with falls and urinary incontinence.   He said he fell twice at [employer] and hit his hand.  Actually, EMS was called by family member and the patient fell at home multiple times recently    Restrictions/Precautions:  Restrictions/Precautions: Fall Risk     Social/Functional History:  Lives With: Spouse  Type of Home: Apartment  Home Layout: One level  Home Access: Level entry  Home Equipment: Walker, 4 wheeled, Lift chair        Receives Help From: Family, Neighbor  ADL Assistance: Needs assistance  Homemaking Assistance: Needs assistance  Ambulation Assistance: Independent  Transfer Assistance: Independent    Active : No  Patient's  Info: DaughterJanet     Additional Comments: DaughterJanet, present and assisted with home information.    SUBJECTIVE: RN approved session, patient supine in bed with head elevated upon OT arrival and agreeable to tx. Finishing breakfast in bed. Patient A & O x 3, however he thought the hospital was located in [place]. Telesitter in room. Talkative/tangential with cues to stay on task t/o.     PAIN: 0/10:     Vitals: Vitals not assessed per clinical judgement, see nursing flowsheet    COGNITION:  Activity, Fall Prevention, and Assistive Device Safety    Goals  Short Term Goals  Time Frame for Short Term Goals: by discharge  Short Term Goal 1: Pt will follow 100% of simple 1 step commands to increase ability to participate in ADL tasks.-DISCONTINUE, PATIENT FOLLOWING COMMANDS  Short Term Goal 2: Pt will complete feeding/grooming tasks with minimal assistance to increase independence with self care tasks.-CONTINUE/UPGRADE  Short Term Goal 3: Pt will tolerate further assessment of EOB sitting balance, transfers/functional mobility by OTR when appropriate.-MET, REVISED  Long Term Goals  Time Frame for Long Term Goals : not set due to ELOS    Revised Short-Term Goals  Short Term Goals  Time Frame for Short Term Goals: by discharge  Short Term Goal 1: patient will tolerate 5 min functional standing with SBA to increaes ease with toileting and grooming.  Short Term Goal 2: patient will complete ADL routine with SBA with 1-2 cues for safety and sequencing.  Short Term Goal 3: patient will functionally ambulate to/from BR with SBA and 1-2 cues for safety.  Short Term Goal 4: patient will demo >/= 4+/5 (B)UE strength to increase ease with functional transfers.  Long Term Goals  Time Frame for Long Term Goals : not set due to ELOS     Following session, patient left in safe position with all fall risk precautions in place.

## 2024-04-17 NOTE — CARE COORDINATION
4/17/24, 3:02 PM EDT    Patient goals/plan/ treatment preferences discussed by  and .  Patient goals/plan/ treatment preferences reviewed with patient/ family.  Patient/ family verbalize understanding of discharge plan and are in agreement with goal/plan/treatment preferences.  Understanding was demonstrated using the teach back method.  AVS provided by RN at time of discharge, which includes all necessary medical information pertaining to the patients current course of illness, treatment, post-discharge goals of care, and treatment preferences.     Services At/After Discharge: Skilled Nursing Facility (SNF)      Spoke with Martha from [facility] and they have accepted that patient at [facility] and can take today.  Updated patient's daughter of plan.  She stated that she can transport and will be here around 4:30.  Updated Krystle at [facility].  Packet placed on chart and RN aware.  RN notified provider.

## 2024-04-17 NOTE — PLAN OF CARE
Problem: Skin/Tissue Integrity  Goal: Absence of new skin breakdown  Description: 1.  Monitor for areas of redness and/or skin breakdown  2.  Assess vascular access sites hourly  3.  Every 4-6 hours minimum:  Change oxygen saturation probe site  4.  Every 4-6 hours:  If on nasal continuous positive airway pressure, respiratory therapy assess nares and determine need for appliance change or resting period.  4/17/2024 0914 by José Luis Jain, RN  Outcome: Progressing     Problem: ABCDS Injury Assessment  Goal: Absence of physical injury  4/17/2024 0914 by José Luis Jain, RN  Outcome: Progressing     Problem: Safety - Adult  Goal: Free from fall injury  4/17/2024 0914 by José Luis Jain, RN  Outcome: Progressing     Problem: Chronic Conditions and Co-morbidities  Goal: Patient's chronic conditions and co-morbidity symptoms are monitored and maintained or improved  4/17/2024 0914 by José Luis Jain, RN  Outcome: Progressing     Problem: Discharge Planning  Goal: Discharge to home or other facility with appropriate resources  4/17/2024 0914 by José Luis Jain, RN  Outcome: Progressing

## 2024-04-17 NOTE — PROGRESS NOTES
This RN spoke with Dr. Guzman regarding discharge, and patients bp 186/79.  Dr. Guzman wanting to hold off on discharge today, and will give metoprolol,  To plan on d/c tomorrow.

## 2024-04-17 NOTE — PROGRESS NOTES
INTERNAL MEDICINE Progress Note  4/17/2024 5:12 PM  Subjective:   Admit Date: 4/6/2024  PCP: Main Caldwell, DO  Interval History:     Daughter at bedside  Alert, calm, rational with good appropriate conversations  on Olanzapine  Tolerating orally    Objective:   Vitals: BP (!) 186/79   Pulse 84   Temp 97.5 °F (36.4 °C) (Axillary)   Resp 16   Ht 1.778 m (5' 10\")   Wt 102.1 kg (225 lb)   SpO2 94%   BMI 32.28 kg/m²   General appearance: alert  HEENT: atraumatic  Neck: no adenopathy, no carotid bruit, no JVD, and supple, symmetrical, trachea midline  Lungs: clear to auscultation bilaterally  Heart: regular rate and rhythm and S1, S2 normal  Abdomen: soft, non-tender; bowel sounds normal; no masses,  no organomegaly  Extremities: extremities normal, atraumatic, no cyanosis or edema  Neurologic: alert, oriented to person, thought content appropriate      Medications:   Scheduled Meds:   metoprolol tartrate  12.5 mg Oral BID    insulin glargine  20 Units SubCUTAneous Nightly    OLANZapine  10 mg Oral Nightly    donepezil  5 mg Oral Nightly    metFORMIN  500 mg Oral BID WC    sodium chloride flush  5-40 mL IntraVENous 2 times per day    apixaban  5 mg Oral BID    insulin lispro  0-4 Units SubCUTAneous TID WC    insulin lispro  0-4 Units SubCUTAneous Nightly    atorvastatin  80 mg Oral Nightly    mometasone-formoterol  2 puff Inhalation BID RT    cyanocobalamin  250 mcg Oral Daily    lisinopril  20 mg Oral Daily    magnesium oxide  250 mg Oral Daily    ferrous sulfate  325 mg Oral Daily with breakfast    potassium bicarb-citric acid  40 mEq Oral Once     Continuous Infusions:   sodium chloride      dextrose         Lab Results:   CBC:   Recent Labs     04/16/24  0536   WBC 8.7   HGB 12.6*          BMP:    Recent Labs     04/16/24  0536      K 3.7      CO2 26   BUN 21   CREATININE 0.8   GLUCOSE 166*         Magnesium:    Lab Results   Component Value Date/Time    MG 1.9 04/09/2024 05:01 AM

## 2024-04-17 NOTE — CARE COORDINATION
4/17/24, 3:27 PM EDT    DISCHARGE PLANNING EVALUATION       Updated Martha at [facility] that discharge is being held.  RN stated that he called the daughter and left her a message.

## 2024-04-17 NOTE — PROGRESS NOTES
[facility]  INPATIENT PHYSICAL THERAPY  DAILY NOTE  STRZ ONC MED 5K - 5K-20/020-A    Time In: 1102  Time Out: 1140  Timed Code Treatment Minutes: 38 Minutes  Minutes: 38          Date: 2024  Patient Name: El Leo,  Gender:  male        MRN: [id number]  : 1945  (78 y.o.)     Referring Practitioner: Mayte Guzman MD  Diagnosis: Hypokalemia  Additional Pertinent Hx: Per EMR: The patient is a 78 y.o. male who presents with  increasing confusion, aggressive behavior, hallucinations, falls at home.  He was relocated to [place] by daughter last September cos of confusion noted by family. He lost his 's License following an accident.  He has been forgetful and recently more aggressive towards his wife. He has visual hallucinations, unsteady gait with falls and urinary incontinence.   He said he fell twice at [employer] and hit his hand.  Actually, EMS was called by family member and the patient fell at home multiple times recently     Prior Level of Function:  Lives With: Spouse  Type of Home: Apartment  Home Layout: One level  Home Access: Level entry  Home Equipment: Walker, 4 wheeled, Lift chair        Receives Help From: Family, Neighbor  ADL Assistance: Needs assistance  Homemaking Assistance: Needs assistance  Ambulation Assistance: Independent  Transfer Assistance: Independent  Active : No  Additional Comments: DaughterJanet, present and assisted with home information.    Restrictions/Precautions:  Restrictions/Precautions: Fall Risk     SUBJECTIVE: RN approves therapy session. Patient resting in recliner, pleasant and agreeable to therapy session    PAIN: 0/10:     Vitals: Vitals not assessed per clinical judgement, see nursing flowsheet    OBJECTIVE:  Bed Mobility:  Not Tested    Transfers:  Sit to Stand: Contact Guard Assistance, with increased time for completion, cues for hand placement  Stand to Sit:Contact Guard Assistance, with increased time for completion, cues

## 2024-04-17 NOTE — PLAN OF CARE
Problem: Respiratory - Adult  Goal: Clear lung sounds  4/17/2024 0806 by Helen Millan, RCP  Note: Patient receiving inhaler to maintain and manage respiratory status. Will be continued as ordered to improve aeration.

## 2024-04-18 VITALS
HEIGHT: 70 IN | RESPIRATION RATE: 16 BRPM | BODY MASS INDEX: 32.21 KG/M2 | OXYGEN SATURATION: 95 % | SYSTOLIC BLOOD PRESSURE: 121 MMHG | TEMPERATURE: 98.1 F | HEART RATE: 77 BPM | WEIGHT: 225 LBS | DIASTOLIC BLOOD PRESSURE: 80 MMHG

## 2024-04-18 PROBLEM — F02.80 ALZHEIMER'S TYPE DEMENTIA (HCC): Chronic | Status: ACTIVE | Noted: 2024-04-18

## 2024-04-18 PROBLEM — G30.9 ALZHEIMER'S TYPE DEMENTIA (HCC): Chronic | Status: ACTIVE | Noted: 2024-04-18

## 2024-04-18 PROBLEM — R53.81 PHYSICAL DECONDITIONING: Status: ACTIVE | Noted: 2024-04-18

## 2024-04-18 PROBLEM — R44.3 HALLUCINATIONS: Status: ACTIVE | Noted: 2024-04-18

## 2024-04-18 PROBLEM — R41.82 ALTERED MENTAL STATUS: Status: RESOLVED | Noted: 2024-04-11 | Resolved: 2024-04-18

## 2024-04-18 PROBLEM — F39 MOOD DISORDER (HCC): Status: ACTIVE | Noted: 2024-04-18

## 2024-04-18 PROBLEM — F02.B11 MODERATE LATE ONSET ALZHEIMER'S DEMENTIA WITH AGITATION (HCC): Status: RESOLVED | Noted: 2024-04-17 | Resolved: 2024-04-18

## 2024-04-18 PROBLEM — G30.1 MODERATE LATE ONSET ALZHEIMER'S DEMENTIA WITH AGITATION (HCC): Status: RESOLVED | Noted: 2024-04-17 | Resolved: 2024-04-18

## 2024-04-18 PROBLEM — R41.0 DELIRIUM: Status: RESOLVED | Noted: 2024-04-06 | Resolved: 2024-04-18

## 2024-04-18 PROBLEM — R29.6 FREQUENT FALLS: Status: ACTIVE | Noted: 2024-04-18

## 2024-04-18 LAB
GLUCOSE BLD STRIP.AUTO-MCNC: 194 MG/DL (ref 70–108)
GLUCOSE BLD STRIP.AUTO-MCNC: 276 MG/DL (ref 70–108)

## 2024-04-18 PROCEDURE — 82948 REAGENT STRIP/BLOOD GLUCOSE: CPT

## 2024-04-18 PROCEDURE — 97116 GAIT TRAINING THERAPY: CPT

## 2024-04-18 PROCEDURE — 2580000003 HC RX 258: Performed by: INTERNAL MEDICINE

## 2024-04-18 PROCEDURE — 94761 N-INVAS EAR/PLS OXIMETRY MLT: CPT

## 2024-04-18 PROCEDURE — 6370000000 HC RX 637 (ALT 250 FOR IP): Performed by: INTERNAL MEDICINE

## 2024-04-18 PROCEDURE — 97530 THERAPEUTIC ACTIVITIES: CPT

## 2024-04-18 PROCEDURE — 94640 AIRWAY INHALATION TREATMENT: CPT

## 2024-04-18 PROCEDURE — 97110 THERAPEUTIC EXERCISES: CPT

## 2024-04-18 RX ADMIN — POLYETHYLENE GLYCOL 3350 17 G: 17 POWDER, FOR SOLUTION ORAL at 09:18

## 2024-04-18 RX ADMIN — Medication 250 MG: at 09:09

## 2024-04-18 RX ADMIN — LISINOPRIL 20 MG: 20 TABLET ORAL at 08:59

## 2024-04-18 RX ADMIN — METOPROLOL TARTRATE 12.5 MG: 25 TABLET, FILM COATED ORAL at 09:05

## 2024-04-18 RX ADMIN — Medication 250 MCG: at 08:57

## 2024-04-18 RX ADMIN — AMLODIPINE BESYLATE 5 MG: 5 TABLET ORAL at 09:08

## 2024-04-18 RX ADMIN — APIXABAN 5 MG: 5 TABLET, FILM COATED ORAL at 08:59

## 2024-04-18 RX ADMIN — METFORMIN HYDROCHLORIDE 500 MG: 500 TABLET ORAL at 08:57

## 2024-04-18 RX ADMIN — FERROUS SULFATE TAB 325 MG (65 MG ELEMENTAL FE) 325 MG: 325 (65 FE) TAB at 08:58

## 2024-04-18 RX ADMIN — SODIUM CHLORIDE, PRESERVATIVE FREE 10 ML: 5 INJECTION INTRAVENOUS at 12:46

## 2024-04-18 RX ADMIN — INSULIN LISPRO 2 UNITS: 100 INJECTION, SOLUTION INTRAVENOUS; SUBCUTANEOUS at 12:48

## 2024-04-18 RX ADMIN — MOMETASONE FUROATE AND FORMOTEROL FUMARATE DIHYDRATE 2 PUFF: 200; 5 AEROSOL RESPIRATORY (INHALATION) at 07:18

## 2024-04-18 ASSESSMENT — PAIN SCALES - WONG BAKER: WONGBAKER_NUMERICALRESPONSE: NO HURT

## 2024-04-18 ASSESSMENT — PAIN SCALES - GENERAL: PAINLEVEL_OUTOF10: 0

## 2024-04-18 NOTE — CARE COORDINATION
4/18/24, 10:24 AM EDT    Patient goals/plan/ treatment preferences discussed by  and .  Patient goals/plan/ treatment preferences reviewed with patient/ family.  Patient/ family verbalize understanding of discharge plan and are in agreement with goal/plan/treatment preferences.  Understanding was demonstrated using the teach back method.  AVS provided by RN at time of discharge, which includes all necessary medical information pertaining to the patients current course of illness, treatment, post-discharge goals of care, and treatment preferences.     Services At/After Discharge: Skilled Nursing Facility (SNF) [facility]    Team meeting this morning, plans for discharge today, daughter plans to transport after work today.     11:43 AM EDT  Call to Krystle with [facility], updated on plans for discharge today and anticipated transport time.

## 2024-04-18 NOTE — PLAN OF CARE
Problem: Skin/Tissue Integrity  Goal: Absence of new skin breakdown  Description: 1.  Monitor for areas of redness and/or skin breakdown  2.  Assess vascular access sites hourly  3.  Every 4-6 hours minimum:  Change oxygen saturation probe site  4.  Every 4-6 hours:  If on nasal continuous positive airway pressure, respiratory therapy assess nares and determine need for appliance change or resting period.  4/18/2024 0733 by José Luis Jain, RN  Outcome: Progressing     Problem: ABCDS Injury Assessment  Goal: Absence of physical injury  4/18/2024 0733 by José Luis Jain, RN  Outcome: Progressing     Problem: Safety - Adult  Goal: Free from fall injury  4/18/2024 0733 by José Luis Jain, RN  Outcome: Progressing     Problem: Chronic Conditions and Co-morbidities  Goal: Patient's chronic conditions and co-morbidity symptoms are monitored and maintained or improved  4/18/2024 0733 by José Luis Jain, RN  Outcome: Progressing     Problem: Discharge Planning  Goal: Discharge to home or other facility with appropriate resources  4/18/2024 0733 by José Luis Jain, RN  Outcome: Progressing

## 2024-04-18 NOTE — PLAN OF CARE
Problem: Skin/Tissue Integrity  Goal: Absence of new skin breakdown  Description: 1.  Monitor for areas of redness and/or skin breakdown  2.  Assess vascular access sites hourly  3.  Every 4-6 hours minimum:  Change oxygen saturation probe site  4.  Every 4-6 hours:  If on nasal continuous positive airway pressure, respiratory therapy assess nares and determine need for appliance change or resting period.  4/18/2024 1616 by José Luis Jain RN  Outcome: Completed     Problem: ABCDS Injury Assessment  Goal: Absence of physical injury  4/18/2024 1616 by José Luis Jain RN  Outcome: Completed     Problem: Safety - Adult  Goal: Free from fall injury  4/18/2024 1616 by José Luis Jain RN  Outcome: Completed     Problem: Chronic Conditions and Co-morbidities  Goal: Patient's chronic conditions and co-morbidity symptoms are monitored and maintained or improved  4/18/2024 1616 by José Luis Jain RN  Outcome: Completed     Problem: Discharge Planning  Goal: Discharge to home or other facility with appropriate resources  4/18/2024 1616 by José Luis Jain RN  Outcome: Completed     Problem: Nutrition Deficit:  Goal: Optimize nutritional status  Outcome: Completed     Problem: Confusion  Goal: Confusion, delirium, dementia, or psychosis is improved or at baseline  Description: INTERVENTIONS:  1. Assess for possible contributors to thought disturbance, including medications, impaired vision or hearing, underlying metabolic abnormalities, dehydration, psychiatric diagnoses, and notify attending LIP  2. [place] high risk fall precautions, as indicated  3. Provide frequent short contacts to provide reality reorientation, refocusing and direction  4. Decrease environmental stimuli, including noise as appropriate  5. Monitor and intervene to maintain adequate nutrition, hydration, elimination, sleep and activity  6. If unable to ensure safety without constant attention obtain sitter and review sitter guidelines with assigned

## 2024-04-18 NOTE — PROGRESS NOTES
INTERNAL MEDICINE Progress Note  4/18/2024 08:30 AM  Subjective:   Admit Date: 4/6/2024  PCP: Main Caldwell, DO  Interval History:       Alert, calm, rational with good appropriate conversations  on Olanzapine  Tolerating orally    Objective:   Vitals: /62   Pulse 95   Temp 97.7 °F (36.5 °C) (Oral)   Resp 16   Ht 1.778 m (5' 10\")   Wt 102.1 kg (225 lb)   SpO2 95%   BMI 32.28 kg/m²   General appearance: alert  HEENT: atraumatic  Neck: no adenopathy, no carotid bruit, no JVD, and supple, symmetrical, trachea midline  Lungs: clear to auscultation bilaterally  Heart: regular rate and rhythm and S1, S2 normal  Abdomen: soft, non-tender; bowel sounds normal; no masses,  no organomegaly  Extremities: extremities normal, atraumatic, no cyanosis or edema  Neurologic: alert, oriented to person, thought content appropriate      Medications:   Scheduled Meds:   metoprolol tartrate  12.5 mg Oral BID    amLODIPine  5 mg Oral Daily    insulin glargine  20 Units SubCUTAneous Nightly    OLANZapine  10 mg Oral Nightly    donepezil  5 mg Oral Nightly    metFORMIN  500 mg Oral BID WC    sodium chloride flush  5-40 mL IntraVENous 2 times per day    apixaban  5 mg Oral BID    insulin lispro  0-4 Units SubCUTAneous TID WC    insulin lispro  0-4 Units SubCUTAneous Nightly    atorvastatin  80 mg Oral Nightly    mometasone-formoterol  2 puff Inhalation BID RT    cyanocobalamin  250 mcg Oral Daily    lisinopril  20 mg Oral Daily    magnesium oxide  250 mg Oral Daily    ferrous sulfate  325 mg Oral Daily with breakfast    potassium bicarb-citric acid  40 mEq Oral Once     Continuous Infusions:   sodium chloride      dextrose         Lab Results:   CBC:   Recent Labs     04/16/24  0536   WBC 8.7   HGB 12.6*          BMP:    Recent Labs     04/16/24  0536      K 3.7      CO2 26   BUN 21   CREATININE 0.8   GLUCOSE 166*         Magnesium:    Lab Results   Component Value Date/Time    MG 1.9 04/09/2024 05:01 AM  with tova, hold BB for bradycardia  hypokalemia    plan   Cont Zyprexa, Aricept  eliquis , resume BB, add amlodipine.  lantus  dc to SNF .    Mayte Guzman MD, MD

## 2024-04-18 NOTE — PLAN OF CARE
Problem: Chronic Conditions and Co-morbidities  Goal: Patient's chronic conditions and co-morbidity symptoms are monitored and maintained or improved  Outcome: Progressing  Care Plan - Patient's Chronic Conditions and Co-Morbidity Symptoms are Monitored and Maintained or Improved: Monitor and assess patient's chronic conditions and comorbid symptoms for stability, deterioration, or improvement     Problem: Safety - Adult  Goal: Free from fall injury  Outcome: Progressing     Problem: Discharge Planning  Goal: Discharge to home or other facility with appropriate resources  Outcome: Progressing  Discharge to home or other facility with appropriate resources: Identify barriers to discharge with patient and caregiver     Problem: Confusion  Goal: Confusion, delirium, dementia, or psychosis is improved or at baseline  Description: INTERVENTIONS:  1. Assess for possible contributors to thought disturbance, including medications, impaired vision or hearing, underlying metabolic abnormalities, dehydration, psychiatric diagnoses, and notify attending LIP  2. [place] high risk fall precautions, as indicated  3. Provide frequent short contacts to provide reality reorientation, refocusing and direction  4. Decrease environmental stimuli, including noise as appropriate  5. Monitor and intervene to maintain adequate nutrition, hydration, elimination, sleep and activity  Outcome: Progressing     Care plan reviewed with patient.  Patient verbalize understanding of the plan of care and contribute to goal setting.

## 2024-04-18 NOTE — DISCHARGE SUMMARY
Discharge Summary    El Leo  :  1945  MRN:  [id number]    Admit date:  2024  Discharge date:      Admitting Physician:  Mayte Guzman MD    Discharge Diagnoses:    AMS / hallucinations              -improved  Confusion in the setting of memory loss and urinary incontinence,   -neurosurgery consulted for ?NPH, considered lumbar drain trial-daughter decided against procedure  Dementia with behavioral disturbance,               -cont aricept, zyprexa              -dc BZP  HTN  DM 2  Chronic a fib, with tova, hold BB for bradycardia  hypokalemia    Patient Active Problem List   Diagnosis    Asbestosis (HCC)    ASHD (arteriosclerotic heart disease)    Atrial fibrillation (HCC)    COPD (chronic obstructive pulmonary disease) ([facility])    DDD (degenerative disc disease), lumbar    DM2 (diabetes mellitus, type 2) ([facility])    Dyslipidemia    GERD (gastroesophageal reflux disease)    H/O sick sinus syndrome    History of CVA (cerebrovascular accident)    Hypertension, essential    Obesity (BMI 30-39.9)    USMAN on CPAP    Osteoarthritis of carpometacarpal (CMC) joint of right thumb    Balance problem    Chronic vertigo    Elevated PSA    History of kidney stones    Delirium    Altered mental status    Moderate late onset Alzheimer's dementia with agitation ([facility])       Admission Condition:  stable  Discharged Condition:  good    Hospital Course:   78-year-old elderly man admitted from home for memory loss confusion and hallucinations.  Patient also had aggressive behavior with tendency to eating out at medical staff.  Has also been aggressive towards wife at home according to the daughter.  Family looking towards nursing home placement.  Patient was seen and evaluated.  CT scan of the head and subsequent MRI of the brain showed brain atrophy with dilatation of the ventricles.  There was a consideration for normal pressure hydrocephalus.  Neurology and neuro surgical services were consulted for lumbar drain trial.

## 2024-04-18 NOTE — PROGRESS NOTES
Report called to HINA Chicas at [facility] RN updated on d/c transportation time with pts' daughter.  All questions answered.

## 2024-04-19 ENCOUNTER — APPOINTMENT (OUTPATIENT)
Dept: CT IMAGING | Age: 79
DRG: 951 | End: 2024-04-19
Payer: MEDICARE

## 2024-04-19 ENCOUNTER — HOSPITAL ENCOUNTER (INPATIENT)
Age: 79
LOS: 5 days | Discharge: NURSING, REHAB OR LONG-TERM CARE | DRG: 951 | End: 2024-04-24
Attending: EMERGENCY MEDICINE | Admitting: STUDENT IN AN ORGANIZED HEALTH CARE EDUCATION/TRAINING PROGRAM
Payer: MEDICARE

## 2024-04-19 ENCOUNTER — APPOINTMENT (OUTPATIENT)
Dept: GENERAL RADIOLOGY | Age: 79
DRG: 951 | End: 2024-04-19
Payer: MEDICARE

## 2024-04-19 ENCOUNTER — PATIENT MESSAGE (OUTPATIENT)
Dept: FAMILY MEDICINE CLINIC | Age: 79
End: 2024-04-19

## 2024-04-19 DIAGNOSIS — R41.82 ALTERED MENTAL STATUS, UNSPECIFIED ALTERED MENTAL STATUS TYPE: Primary | ICD-10-CM

## 2024-04-19 DIAGNOSIS — Z51.5 PALLIATIVE CARE PATIENT: ICD-10-CM

## 2024-04-19 DIAGNOSIS — S22.080A T12 COMPRESSION FRACTURE, INITIAL ENCOUNTER (HCC): ICD-10-CM

## 2024-04-19 DIAGNOSIS — S22.080A COMPRESSION FRACTURE OF T12 VERTEBRA, INITIAL ENCOUNTER (HCC): ICD-10-CM

## 2024-04-19 DIAGNOSIS — J43.9 PULMONARY EMPHYSEMA, UNSPECIFIED EMPHYSEMA TYPE (HCC): Chronic | ICD-10-CM

## 2024-04-19 PROBLEM — S22.089A T11 VERTEBRAL FRACTURE (HCC): Status: ACTIVE | Noted: 2024-04-19

## 2024-04-19 LAB
AMMONIA PLAS-MCNC: 20 UMOL/L (ref 11–60)
ANION GAP SERPL CALC-SCNC: 15 MEQ/L (ref 8–16)
BASOPHILS ABSOLUTE: 0.1 THOU/MM3 (ref 0–0.1)
BASOPHILS NFR BLD AUTO: 1 %
BUN SERPL-MCNC: 27 MG/DL (ref 7–22)
CALCIUM SERPL-MCNC: 9.2 MG/DL (ref 8.5–10.5)
CHLORIDE SERPL-SCNC: 103 MEQ/L (ref 98–111)
CK SERPL-CCNC: 75 U/L (ref 55–170)
CO2 SERPL-SCNC: 24 MEQ/L (ref 23–33)
CREAT SERPL-MCNC: 1 MG/DL (ref 0.4–1.2)
DEPRECATED RDW RBC AUTO: 44.9 FL (ref 35–45)
EOSINOPHIL NFR BLD AUTO: 3 %
EOSINOPHILS ABSOLUTE: 0.3 THOU/MM3 (ref 0–0.4)
ERYTHROCYTE [DISTWIDTH] IN BLOOD BY AUTOMATED COUNT: 13.2 % (ref 11.5–14.5)
GFR SERPL CREATININE-BSD FRML MDRD: 76 ML/MIN/1.73M2
GLUCOSE SERPL-MCNC: 162 MG/DL (ref 70–108)
HCT VFR BLD AUTO: 43.4 % (ref 42–52)
HGB BLD-MCNC: 14.2 GM/DL (ref 14–18)
IMM GRANULOCYTES # BLD AUTO: 0.03 THOU/MM3 (ref 0–0.07)
IMM GRANULOCYTES NFR BLD AUTO: 0.3 %
LYMPHOCYTES ABSOLUTE: 3.2 THOU/MM3 (ref 1–4.8)
LYMPHOCYTES NFR BLD AUTO: 35.3 %
MCH RBC QN AUTO: 30.8 PG (ref 26–33)
MCHC RBC AUTO-ENTMCNC: 32.7 GM/DL (ref 32.2–35.5)
MCV RBC AUTO: 94.1 FL (ref 80–94)
MONOCYTES ABSOLUTE: 1 THOU/MM3 (ref 0.4–1.3)
MONOCYTES NFR BLD AUTO: 10.6 %
NEUTROPHILS NFR BLD AUTO: 49.8 %
NRBC BLD AUTO-RTO: 0 /100 WBC
OSMOLALITY SERPL CALC.SUM OF ELEC: 291.8 MOSMOL/KG (ref 275–300)
PLATELET # BLD AUTO: 309 THOU/MM3 (ref 130–400)
PMV BLD AUTO: 9.7 FL (ref 9.4–12.4)
POTASSIUM SERPL-SCNC: 3.9 MEQ/L (ref 3.5–5.2)
RBC # BLD AUTO: 4.61 MILL/MM3 (ref 4.7–6.1)
SEGMENTED NEUTROPHILS ABSOLUTE COUNT: 4.5 THOU/MM3 (ref 1.8–7.7)
SODIUM SERPL-SCNC: 142 MEQ/L (ref 135–145)
TROPONIN, HIGH SENSITIVITY: 50 NG/L (ref 0–12)
TROPONIN, HIGH SENSITIVITY: 53 NG/L (ref 0–12)
WBC # BLD AUTO: 9 THOU/MM3 (ref 4.8–10.8)

## 2024-04-19 PROCEDURE — 74177 CT ABD & PELVIS W/CONTRAST: CPT

## 2024-04-19 PROCEDURE — 72125 CT NECK SPINE W/O DYE: CPT

## 2024-04-19 PROCEDURE — 70450 CT HEAD/BRAIN W/O DYE: CPT

## 2024-04-19 PROCEDURE — 99223 1ST HOSP IP/OBS HIGH 75: CPT | Performed by: STUDENT IN AN ORGANIZED HEALTH CARE EDUCATION/TRAINING PROGRAM

## 2024-04-19 PROCEDURE — 80048 BASIC METABOLIC PNL TOTAL CA: CPT

## 2024-04-19 PROCEDURE — 82140 ASSAY OF AMMONIA: CPT

## 2024-04-19 PROCEDURE — 71260 CT THORAX DX C+: CPT

## 2024-04-19 PROCEDURE — 6820000002 HC L2 INJURY CALL ACTIVATION: Performed by: SURGERY

## 2024-04-19 PROCEDURE — 84484 ASSAY OF TROPONIN QUANT: CPT

## 2024-04-19 PROCEDURE — 93005 ELECTROCARDIOGRAM TRACING: CPT

## 2024-04-19 PROCEDURE — 93010 ELECTROCARDIOGRAM REPORT: CPT | Performed by: INTERNAL MEDICINE

## 2024-04-19 PROCEDURE — 76376 3D RENDER W/INTRP POSTPROCES: CPT

## 2024-04-19 PROCEDURE — 6360000004 HC RX CONTRAST MEDICATION

## 2024-04-19 PROCEDURE — 99285 EMERGENCY DEPT VISIT HI MDM: CPT

## 2024-04-19 PROCEDURE — 1200000000 HC SEMI PRIVATE

## 2024-04-19 PROCEDURE — 36415 COLL VENOUS BLD VENIPUNCTURE: CPT

## 2024-04-19 PROCEDURE — 85025 COMPLETE CBC W/AUTO DIFF WBC: CPT

## 2024-04-19 PROCEDURE — 2580000003 HC RX 258: Performed by: STUDENT IN AN ORGANIZED HEALTH CARE EDUCATION/TRAINING PROGRAM

## 2024-04-19 PROCEDURE — 82550 ASSAY OF CK (CPK): CPT

## 2024-04-19 PROCEDURE — 71045 X-RAY EXAM CHEST 1 VIEW: CPT

## 2024-04-19 PROCEDURE — 99284 EMERGENCY DEPT VISIT MOD MDM: CPT | Performed by: SURGERY

## 2024-04-19 RX ORDER — SODIUM CHLORIDE 9 MG/ML
INJECTION, SOLUTION INTRAVENOUS PRN
Status: DISCONTINUED | OUTPATIENT
Start: 2024-04-19 | End: 2024-04-24 | Stop reason: HOSPADM

## 2024-04-19 RX ORDER — GLYCOPYRROLATE 0.2 MG/ML
0.2 INJECTION INTRAMUSCULAR; INTRAVENOUS
Status: DISCONTINUED | OUTPATIENT
Start: 2024-04-19 | End: 2024-04-24 | Stop reason: HOSPADM

## 2024-04-19 RX ORDER — SODIUM CHLORIDE 0.9 % (FLUSH) 0.9 %
5-40 SYRINGE (ML) INJECTION EVERY 12 HOURS SCHEDULED
Status: DISCONTINUED | OUTPATIENT
Start: 2024-04-19 | End: 2024-04-24 | Stop reason: HOSPADM

## 2024-04-19 RX ORDER — SODIUM CHLORIDE 0.9 % (FLUSH) 0.9 %
5-40 SYRINGE (ML) INJECTION PRN
Status: DISCONTINUED | OUTPATIENT
Start: 2024-04-19 | End: 2024-04-24 | Stop reason: HOSPADM

## 2024-04-19 RX ORDER — HALOPERIDOL 5 MG/ML
2 INJECTION INTRAMUSCULAR EVERY 4 HOURS PRN
Status: DISCONTINUED | OUTPATIENT
Start: 2024-04-19 | End: 2024-04-24 | Stop reason: HOSPADM

## 2024-04-19 RX ORDER — HALOPERIDOL 2 MG/ML
2 SOLUTION ORAL EVERY 4 HOURS PRN
Status: DISCONTINUED | OUTPATIENT
Start: 2024-04-19 | End: 2024-04-24 | Stop reason: HOSPADM

## 2024-04-19 RX ORDER — ACETAMINOPHEN 325 MG/1
650 TABLET ORAL EVERY 4 HOURS PRN
Status: DISCONTINUED | OUTPATIENT
Start: 2024-04-19 | End: 2024-04-24 | Stop reason: HOSPADM

## 2024-04-19 RX ORDER — MORPHINE SULFATE 20 MG/ML
5 SOLUTION ORAL
Status: DISCONTINUED | OUTPATIENT
Start: 2024-04-19 | End: 2024-04-24 | Stop reason: HOSPADM

## 2024-04-19 RX ORDER — ACETAMINOPHEN 650 MG/1
650 SUPPOSITORY RECTAL EVERY 4 HOURS PRN
Status: DISCONTINUED | OUTPATIENT
Start: 2024-04-19 | End: 2024-04-24 | Stop reason: HOSPADM

## 2024-04-19 RX ORDER — MORPHINE SULFATE 2 MG/ML
2 INJECTION, SOLUTION INTRAMUSCULAR; INTRAVENOUS
Status: DISCONTINUED | OUTPATIENT
Start: 2024-04-19 | End: 2024-04-24 | Stop reason: HOSPADM

## 2024-04-19 RX ORDER — HYOSCYAMINE SULFATE 0.125 MG
125 TABLET,DISINTEGRATING ORAL
Status: DISCONTINUED | OUTPATIENT
Start: 2024-04-19 | End: 2024-04-24 | Stop reason: HOSPADM

## 2024-04-19 RX ORDER — LORAZEPAM 2 MG/ML
0.5 INJECTION INTRAMUSCULAR
Status: DISCONTINUED | OUTPATIENT
Start: 2024-04-19 | End: 2024-04-24 | Stop reason: HOSPADM

## 2024-04-19 RX ORDER — LORAZEPAM 0.5 MG/1
0.5 TABLET ORAL EVERY 4 HOURS PRN
Status: DISCONTINUED | OUTPATIENT
Start: 2024-04-19 | End: 2024-04-20

## 2024-04-19 RX ADMIN — SODIUM CHLORIDE, PRESERVATIVE FREE 10 ML: 5 INJECTION INTRAVENOUS at 19:46

## 2024-04-19 RX ADMIN — IOPAMIDOL 80 ML: 755 INJECTION, SOLUTION INTRAVENOUS at 08:48

## 2024-04-19 ASSESSMENT — LIFESTYLE VARIABLES
HOW OFTEN DO YOU HAVE A DRINK CONTAINING ALCOHOL: NEVER
HOW MANY STANDARD DRINKS CONTAINING ALCOHOL DO YOU HAVE ON A TYPICAL DAY: PATIENT DOES NOT DRINK

## 2024-04-19 NOTE — ED NOTES
Family has decided to proceed with DNR and palliative care.  Dr. Lucio speaking with daughter at this time.

## 2024-04-19 NOTE — CARE COORDINATION
Per request of Dr. Devi and Dr. Lucio spoke with daughter Janet in person and spouse Simin via phone about code status and hospice services. Spouse agreeable to DNRCCA limited x 4. Dr. Lucio in room and spouse and daughter agreeable to hospice services. Listing offered and declined wanting [facility] hosp[facility]. Dr. [place] consulted and patient will be admitted IP hospice at this time. Denied concerns and needs.

## 2024-04-19 NOTE — ED NOTES
ED to inpatient nurses report      Chief Complaint:  Chief Complaint   Patient presents with    Altered Mental Status     Present to ED from: [facility]    MOA:     LOC:  Unresponsive  Mobility: Fully dependent  Oxygen Baseline: 94% RA    Current needs required: Room Air     Code Status:   DNR-CCA    What abnormal results were found and what did you give/do to treat them? Elevated troponin, repeat pending.  Any procedures or intervention occur? Comfort measures, code status change.    Mental Status:  Level of Consciousness: Responds to pain (2)    Psych Assessment:        Vitals:  Patient Vitals for the past 24 hrs:   BP Temp Pulse Resp SpO2   04/19/24 0939 102/62 -- 50 15 92 %   04/19/24 0856 (!) 109/58 -- 64 14 95 %   04/19/24 0808 135/83 97.4 °F (36.3 °C) 70 20 94 %        LDAs:   Peripheral IV 04/19/24 Right Antecubital (Active)   Site Assessment Clean, dry & intact 04/19/24 0823       Peripheral IV 04/19/24 Distal;Left Forearm (Active)   Site Assessment Clean, dry & intact 04/19/24 0823       Ambulatory Status:  Presents to emergency department  because of falls (Syncope, seizure, or loss of consciousness): Yes, Age > 70: Yes, Altered Mental Status, Intoxication with alcohol or substance confusion (Disorientation, impaired judgment, poor safety awaremess, or inability to follow instructions): Yes, Impaired Mobility: Ambulates or transfers with assistive devices or assistance; Unable to ambulate or transer.: Yes, Nursing Judgement: Yes    Diagnosis:  DISPOSITION Decision To Admit 04/19/2024 10:21:13 AM   Final diagnoses:   Altered mental status, unspecified altered mental status type   Compression fracture of T12 vertebra, initial encounter ([facility])        Consults:  IP CONSULT TO CASE MANAGEMENT  PALLIATIVE CARE EVAL     Pain Score:       C-SSRS:   Risk of Suicide: No Risk    Sepsis Screening:  Sepsis Risk Score: 1.43    Enterprise Fall Risk:  Enterprise 1 Fall Risk  Presents to emergency department  because of falls

## 2024-04-19 NOTE — ED PROVIDER NOTES
[facility] EMERGENCY DEPT      EMERGENCY MEDICINE     Pt Name: El Leo  MRN: [id number]  Birthdate 1945  Date of evaluation: 4/19/2024  Resident Physician: Avery Lucio MD  Attending Physician: Constantine Steve DO    CHIEF COMPLAINT       Chief Complaint   Patient presents with    Altered Mental Status     HISTORY OF PRESENT ILLNESS   El Leo is a 78 y.o. male who presents to the emergency department from nursing home, brought in by EMS for evaluation of found down    Patient was found down unresponsive with unknown downtime at the nursing home.  EMS was called out to the scene.  During transport EMS reports patient became more responsive.    Patient was recently admitted to the hospital for concern for altered mental status, confusion, dementia with concern this may have been normal pressure hydrocephalus.  Interventions were deferred by medical decision-maker at that time.  Patient does arrive as a full code.      The patient has no other acute complaints at this time.    ROS Negative Except as Documented Above.    PASTMEDICAL HISTORY     Past Medical History:   Diagnosis Date    Alzheimer's type dementia (HCC) 04/18/2024    Asbestosis ([facility])     ASHD (arteriosclerotic heart disease)     Atrial fibrillation (HCC)     COPD (chronic obstructive pulmonary disease) ([facility])     DDD (degenerative disc disease), lumbar     s/p lumbar fusion with geetha placement    DM2 (diabetes mellitus, type 2) ([facility])     Dyslipidemia     GERD (gastroesophageal reflux disease)     H/O sick sinus syndrome     History of CVA (cerebrovascular accident)     History of small, chronic, cerebral infarcts, involving left frontal and parietal lobe cortices and bilateral cerebellar hemispheres, per MRI of the brain on 04/07/2020    History of kidney stones 04/03/2024    Hypertension, essential     Obesity (BMI 30-39.9)     USMAN on CPAP     Osteoarthritis of carpometacarpal (CMC) joint of right thumb     Chronic thumb pain,

## 2024-04-19 NOTE — CONSULTS
and/or confirmed all problems associated with this patient encounter by my own direct physical examination of this patient and review of all radiology studies and labwork  that were ordered and available.    Active Hospital Problems    Diagnosis     T12 compression fracture (HCC) [S22.080A]     T11 vertebral fracture (HCC) [S22.089A]     Frequent falls [R29.6]     Altered mental status [R41.82]     Atrial fibrillation (HCC) [I48.91]     COPD (chronic obstructive pulmonary disease) (HCC) [J44.9]     DM2 (diabetes mellitus, type 2) (HCC) [E11.9]     GERD (gastroesophageal reflux disease) [K21.9]     H/O sick sinus syndrome [Z86.79]     History of CVA (cerebrovascular accident) [Z86.73]     Hypertension, essential [I10]     USMAN on CPAP [G47.33]         I  discussed the management of all of the identified problems with the APN or PA.      I formulated the treatment plan for all identified problems and discussed those with the APN or PA .      This management plan was then carried out and the patient's orders for care by the APN or PA.        Please see our orders that were directed and approved by me if there are any new ones for the updated patient care plan.    Above discussed and I agree with documentation and orders placed by Shruthi Carmona PA    See any additional comments if needed below for any other updated orders and plans.

## 2024-04-19 NOTE — ED NOTES
Dr. Colorado in department to see patient and speak with family.  [facility] update on decision to admit.

## 2024-04-19 NOTE — ED NOTES
Patient resting on cart with no signs of distress observed.  Daughter remains at bedside.  Patient awaiting bed on 5K.

## 2024-04-19 NOTE — ED NOTES
Patient to room 3 as a level 2 trauma alert.  Patient was discharged from this hospital yesterday following an admission for altered mental status, hallucinations, and falls.  Patient was then admitted to [facility].  Per report, patient was last seen well and at baseline 0530 today.  Patient was then found lying on the floor around 0730.  This event was unwitnessed.  Reportedly, the patient was unresponsive at this time and EMS was called.  EMS reports that patient's mentation was improving en route, at this time he moans and withdraws from painful stimuli.  No visible signs of injury observed, including head trauma.  Patient does take Eliquis.  EMS reports an accucheck of 202.  Dr. Lucio, Dr. Devi, and Dr. Middleton at bedside to assess.

## 2024-04-19 NOTE — PROGRESS NOTES
Hospice referral completed in room with Candido, daughter Janet present, and wife Simin on phone. Candido resting in bed with eyes closed able to participate in conversations with no /yes questions appropriately. Hospice concepts, philosophies, and services explained. At this time let them know patient is meeting for inpatient hospice but if he does okay, may have to consider placement elsewhere. Per discussion with Dr. Colorado will see how patient does through weekend with symptoms. [place] that patient may have sudden onset of pain or dyspnea that would not be controlled well at other level of hospice care.   Family agreeable to comfort measures with hospice paperwork signed.     IP Admit Date: 04.19.2024    Terminal Diagnosis: T12 compression fracture, initial encounter     Secondary Diagnosis:     Asbestosis ([facility])     ASHD (arteriosclerotic heart disease)    Atrial fibrillation ([facility])    COPD (chronic obstructive pulmonary disease) ([facility])    DDD (degenerative disc disease), lumbar    DM2 (diabetes mellitus, type 2) ([facility])    Dyslipidemia    GERD (gastroesophageal reflux disease)    H/O sick sinus syndrome    History of CVA (cerebrovascular accident)    Hypertension, essential    Obesity (BMI 30-39.9)    USMAN on CPAP    Osteoarthritis of carpometacarpal (CMC) joint of right thumb    Balance problem    Chronic vertigo    Elevated PSA    History of kidney stones    Delirium    Altered mental status    Moderate late onset Alzheimer's dementia with agitation ([facility])        Reviewed Nursing Notes for Previous 24 hours of Inpatient Charting:       Pain Scale:   Denied pain on this time      Graphics:   /64   Pulse 55   Temp 97.4 °F (36.3 °C) Comment: oral  Resp 11   SpO2 93%     Assessment (Head to Toe):   RESPIRATORY:  clear upper lobes, diminished lower  CARDIOVASCULAR:  bradycardia, a fib  GASTROINTESTINAL: abdomen soft, non-tender, bowel sounds active x 4   GENITOURINARY:  external catheter with clear yellow urine   SKIN:  none

## 2024-04-19 NOTE — ED NOTES
Patient noted to be bradycardic on the monitor.  Heart rate fluctuating from 48-60 with pauses.  Daughter and care coordinator speaking with wife who is POA via telephone at this time.

## 2024-04-19 NOTE — CONSULTS
Trauma Consult     Patient:  El Leo  Admit date: 4/19/2024   YOB: 1945 Date of Evaluation: 4/19/2024  MRN: [id number]  Acct: [id number]    Injury Date:04/19/2024 Injury time:  unknown   PCP: Main Caldwell DO   Referring physician: Dr Lucio    Time of Trauma Surgeon Notification:  0804  Time of Trauma GISELA Arrival: 0807  Time of Trauma Surgeon Arrival:  0810  Services Requested Within 30 Minutes: N/A Time Contacted:N/A    Assessment:    Fall   Altered mental status   T12 compression fracture  T11 superior endplate fracture     Plan:    Fall    - unclear downtime; fell at some point at SNF   - no intracranial bleed   - isolated spine injuries noted on imaging    Altered mental status    - unclear etiology   - patient recently admitted with concern for possible normal pressure hydrocephalus   - Ammonia level and labs are non-concerning at this time    - consult to palliative medicine by ER physician      T12 compression fracture   - suspect non-operative in nature   - isolated spine incident but not an acute general surgery issue     T11 superior endplate fracture    - suspect non-operative in nature   - isolated spine incident but does not represent an acute general surgery issue      No acute surgical issues identified for trauma intervention. Trauma will sign off.     Activation: []Level I (Trauma Alert) [x]Level II (Injury Call) []Level III (Trauma Consult) []Downgraded  Mode of Arrival: EMS transportation  Referring Facility: N/A   Loss of Consciousness []No [x]Yes[]Unknown     Unknown Duration(min)  Mechanism of Injury:  []Motor Vehicle crash   []Single Vehicle [] []Passenger []Scene Fatality []Front Seat  []Restrained   []Air Bag Deployed   []Ejected []Rollover []Pedestrian []Trapped   Type of vehicle:   Protective Devices:   []Motorcycle  Wearing Helmet []Yes []No  []Bicycle  Wearing Helmet []Yes []No  [x]Fall   Distance - ground level fall    []Assault    Abuse Reported  medical decision making and discussing the case with staff, patient and family.      CC: AMS, found down unknown length of time    HPI: Patient presented from [facility] after just being discharged yesterday where he was admitted for altered mental status with hallucinations, dementia, confusion with a history of memory loss and dementia with behavioral disturbance was found down at the nursing home do not know why he was on the floor or how he got on the floor or how long he been on the floor but no external evidence of trauma was noted no new bruising no marks on his head no skin tears nothing.  Brought in a level 2 was called.  We evaluated in the emergency room where a FAST exam was negative full-body exam revealed no acute injury findings old bruise over his left hip area some old scars over his left hip and an old spine fixation incision.  He was in a collar but unresponsive with a GCS of 9.    Physical Exam:  ED Triage Vitals [04/19/24 0808]   Enc Vitals Group      /83      Pulse 70      Respirations 20      Temp 97.4 °F (36.3 °C)      Temp src       SpO2 94 %      Weight       Height       Head Circumference       Peak Flow       Pain Score       Pain Loc       Pain Edu?       Excl. in GC?      Vitals:    04/19/24 0808 04/19/24 0856 04/19/24 0939 04/19/24 1127   BP: 135/83 (!) 109/58 102/62 110/64   Pulse: 70 64 50 55   Resp: 20 14 15 11   Temp: 97.4 °F (36.3 °C)      SpO2: 94% 95% 92% 93%         GENERAL: Eyes closed not responsive in a cervical collar breathing and maintaining his own saturation normal blood pressure  HEAD: Normocephalic, without obvious abnormality, atraumatic  EYES: pupils equal, round, and reactive to light, both were 3 mm  EARS: normal TMs  NOSE: Nares normal. Septum midline. Mucosa normal. No drainage or sinus tenderness.  THROAT:normal  NECK:normal C-spine, no tenderness, FROM without pain, normal neurological exam of arms; normal DTRs, motor, sensory exam  BREATH SOUNDS: sounds:

## 2024-04-19 NOTE — ED NOTES
Pt transported to [facility] in stable condition. Floor contacted before transport,spoke to tyesha.

## 2024-04-20 PROBLEM — Z51.5 HOSPICE CARE PATIENT: Status: ACTIVE | Noted: 2024-04-20

## 2024-04-20 LAB
GLUCOSE BLD STRIP.AUTO-MCNC: 236 MG/DL (ref 70–108)
GLUCOSE BLD STRIP.AUTO-MCNC: 258 MG/DL (ref 70–108)

## 2024-04-20 PROCEDURE — 2580000003 HC RX 258: Performed by: STUDENT IN AN ORGANIZED HEALTH CARE EDUCATION/TRAINING PROGRAM

## 2024-04-20 PROCEDURE — 6370000000 HC RX 637 (ALT 250 FOR IP): Performed by: STUDENT IN AN ORGANIZED HEALTH CARE EDUCATION/TRAINING PROGRAM

## 2024-04-20 PROCEDURE — 94640 AIRWAY INHALATION TREATMENT: CPT

## 2024-04-20 PROCEDURE — 1200000000 HC SEMI PRIVATE

## 2024-04-20 PROCEDURE — 82948 REAGENT STRIP/BLOOD GLUCOSE: CPT

## 2024-04-20 PROCEDURE — 94760 N-INVAS EAR/PLS OXIMETRY 1: CPT

## 2024-04-20 RX ORDER — LORAZEPAM 0.5 MG/1
0.5 TABLET ORAL
Status: DISCONTINUED | OUTPATIENT
Start: 2024-04-20 | End: 2024-04-24 | Stop reason: HOSPADM

## 2024-04-20 RX ORDER — PANTOPRAZOLE SODIUM 40 MG/1
40 TABLET, DELAYED RELEASE ORAL
Status: DISCONTINUED | OUTPATIENT
Start: 2024-04-21 | End: 2024-04-24 | Stop reason: HOSPADM

## 2024-04-20 RX ORDER — GLUCAGON 1 MG/ML
1 KIT INJECTION PRN
Status: DISCONTINUED | OUTPATIENT
Start: 2024-04-20 | End: 2024-04-24 | Stop reason: HOSPADM

## 2024-04-20 RX ORDER — PNV NO.95/FERROUS FUM/FOLIC AC 28MG-0.8MG
250 TABLET ORAL DAILY
Status: DISCONTINUED | OUTPATIENT
Start: 2024-04-20 | End: 2024-04-24 | Stop reason: HOSPADM

## 2024-04-20 RX ORDER — AMLODIPINE BESYLATE 5 MG/1
5 TABLET ORAL DAILY
Status: DISCONTINUED | OUTPATIENT
Start: 2024-04-20 | End: 2024-04-24 | Stop reason: HOSPADM

## 2024-04-20 RX ORDER — HYDROCHLOROTHIAZIDE 25 MG/1
25 TABLET ORAL DAILY
Status: DISCONTINUED | OUTPATIENT
Start: 2024-04-20 | End: 2024-04-24 | Stop reason: HOSPADM

## 2024-04-20 RX ORDER — ALBUTEROL SULFATE 90 UG/1
2 AEROSOL, METERED RESPIRATORY (INHALATION)
Status: DISCONTINUED | OUTPATIENT
Start: 2024-04-21 | End: 2024-04-24 | Stop reason: HOSPADM

## 2024-04-20 RX ORDER — LANOLIN ALCOHOL/MO/W.PET/CERES
250 CREAM (GRAM) TOPICAL DAILY
Status: DISCONTINUED | OUTPATIENT
Start: 2024-04-20 | End: 2024-04-24 | Stop reason: HOSPADM

## 2024-04-20 RX ORDER — INSULIN LISPRO 100 [IU]/ML
0-4 INJECTION, SOLUTION INTRAVENOUS; SUBCUTANEOUS NIGHTLY
Status: DISCONTINUED | OUTPATIENT
Start: 2024-04-20 | End: 2024-04-24 | Stop reason: HOSPADM

## 2024-04-20 RX ORDER — ALBUTEROL SULFATE 90 UG/1
2 AEROSOL, METERED RESPIRATORY (INHALATION) EVERY 4 HOURS PRN
Status: DISCONTINUED | OUTPATIENT
Start: 2024-04-20 | End: 2024-04-24 | Stop reason: HOSPADM

## 2024-04-20 RX ORDER — INSULIN GLARGINE 100 [IU]/ML
20 INJECTION, SOLUTION SUBCUTANEOUS 2 TIMES DAILY
Status: DISCONTINUED | OUTPATIENT
Start: 2024-04-20 | End: 2024-04-24 | Stop reason: HOSPADM

## 2024-04-20 RX ORDER — INSULIN LISPRO 100 [IU]/ML
0-4 INJECTION, SOLUTION INTRAVENOUS; SUBCUTANEOUS
Status: DISCONTINUED | OUTPATIENT
Start: 2024-04-20 | End: 2024-04-24 | Stop reason: HOSPADM

## 2024-04-20 RX ORDER — POLYETHYLENE GLYCOL 3350 17 G/17G
17 POWDER, FOR SOLUTION ORAL DAILY PRN
Status: DISCONTINUED | OUTPATIENT
Start: 2024-04-20 | End: 2024-04-24 | Stop reason: HOSPADM

## 2024-04-20 RX ORDER — ATORVASTATIN CALCIUM 80 MG/1
80 TABLET, FILM COATED ORAL DAILY
Status: DISCONTINUED | OUTPATIENT
Start: 2024-04-20 | End: 2024-04-24 | Stop reason: HOSPADM

## 2024-04-20 RX ORDER — MECLIZINE HCL 12.5 MG/1
12.5 TABLET ORAL 3 TIMES DAILY PRN
Status: DISCONTINUED | OUTPATIENT
Start: 2024-04-20 | End: 2024-04-24 | Stop reason: HOSPADM

## 2024-04-20 RX ORDER — LISINOPRIL 20 MG/1
20 TABLET ORAL DAILY
Status: DISCONTINUED | OUTPATIENT
Start: 2024-04-20 | End: 2024-04-24 | Stop reason: HOSPADM

## 2024-04-20 RX ORDER — OLANZAPINE 10 MG/1
10 TABLET ORAL NIGHTLY
Status: DISCONTINUED | OUTPATIENT
Start: 2024-04-20 | End: 2024-04-24 | Stop reason: HOSPADM

## 2024-04-20 RX ORDER — VITAMIN B COMPLEX
2000 TABLET ORAL DAILY
Status: DISCONTINUED | OUTPATIENT
Start: 2024-04-20 | End: 2024-04-24 | Stop reason: HOSPADM

## 2024-04-20 RX ORDER — SENNOSIDES A AND B 8.6 MG/1
1 TABLET, FILM COATED ORAL NIGHTLY PRN
Status: DISCONTINUED | OUTPATIENT
Start: 2024-04-20 | End: 2024-04-24 | Stop reason: HOSPADM

## 2024-04-20 RX ORDER — DEXTROSE MONOHYDRATE 100 MG/ML
INJECTION, SOLUTION INTRAVENOUS CONTINUOUS PRN
Status: DISCONTINUED | OUTPATIENT
Start: 2024-04-20 | End: 2024-04-24 | Stop reason: HOSPADM

## 2024-04-20 RX ORDER — DONEPEZIL HYDROCHLORIDE 5 MG/1
5 TABLET, FILM COATED ORAL NIGHTLY
Status: DISCONTINUED | OUTPATIENT
Start: 2024-04-20 | End: 2024-04-24 | Stop reason: HOSPADM

## 2024-04-20 RX ORDER — FERROUS SULFATE 325(65) MG
325 TABLET ORAL DAILY
Status: DISCONTINUED | OUTPATIENT
Start: 2024-04-20 | End: 2024-04-24 | Stop reason: HOSPADM

## 2024-04-20 RX ADMIN — METOPROLOL TARTRATE 12.5 MG: 25 TABLET, FILM COATED ORAL at 20:26

## 2024-04-20 RX ADMIN — OLANZAPINE 10 MG: 10 TABLET, FILM COATED ORAL at 20:26

## 2024-04-20 RX ADMIN — HYDROCHLOROTHIAZIDE 25 MG: 25 TABLET ORAL at 16:08

## 2024-04-20 RX ADMIN — LORAZEPAM 0.5 MG: 0.5 TABLET ORAL at 20:25

## 2024-04-20 RX ADMIN — FERROUS SULFATE TAB 325 MG (65 MG ELEMENTAL FE) 325 MG: 325 (65 FE) TAB at 16:09

## 2024-04-20 RX ADMIN — MOMETASONE FUROATE AND FORMOTEROL FUMARATE DIHYDRATE 2 PUFF: 200; 5 AEROSOL RESPIRATORY (INHALATION) at 21:58

## 2024-04-20 RX ADMIN — APIXABAN 5 MG: 5 TABLET, FILM COATED ORAL at 20:26

## 2024-04-20 RX ADMIN — SODIUM CHLORIDE, PRESERVATIVE FREE 10 ML: 5 INJECTION INTRAVENOUS at 08:56

## 2024-04-20 RX ADMIN — Medication 250 MG: at 16:53

## 2024-04-20 RX ADMIN — INSULIN LISPRO 1 UNITS: 100 INJECTION, SOLUTION INTRAVENOUS; SUBCUTANEOUS at 16:17

## 2024-04-20 RX ADMIN — LISINOPRIL 20 MG: 20 TABLET ORAL at 16:09

## 2024-04-20 RX ADMIN — APIXABAN 5 MG: 5 TABLET, FILM COATED ORAL at 16:09

## 2024-04-20 RX ADMIN — ATORVASTATIN CALCIUM 80 MG: 80 TABLET, FILM COATED ORAL at 16:09

## 2024-04-20 RX ADMIN — SODIUM CHLORIDE, PRESERVATIVE FREE 10 ML: 5 INJECTION INTRAVENOUS at 20:26

## 2024-04-20 RX ADMIN — ACETAMINOPHEN 650 MG: 325 TABLET ORAL at 20:25

## 2024-04-20 RX ADMIN — DONEPEZIL HYDROCHLORIDE 5 MG: 5 TABLET, FILM COATED ORAL at 20:26

## 2024-04-20 RX ADMIN — Medication 2000 UNITS: at 16:08

## 2024-04-20 RX ADMIN — AMLODIPINE BESYLATE 5 MG: 5 TABLET ORAL at 16:09

## 2024-04-20 RX ADMIN — METOPROLOL TARTRATE 12.5 MG: 25 TABLET, FILM COATED ORAL at 16:10

## 2024-04-20 RX ADMIN — LORAZEPAM 0.5 MG: 0.5 TABLET ORAL at 02:26

## 2024-04-20 RX ADMIN — METFORMIN HYDROCHLORIDE 250 MG: 500 TABLET ORAL at 16:08

## 2024-04-20 RX ADMIN — INSULIN GLARGINE 20 UNITS: 100 INJECTION, SOLUTION SUBCUTANEOUS at 20:25

## 2024-04-20 RX ADMIN — Medication 250 MCG: at 16:10

## 2024-04-20 ASSESSMENT — PAIN DESCRIPTION - DESCRIPTORS: DESCRIPTORS: ACHING;DISCOMFORT

## 2024-04-20 ASSESSMENT — PAIN DESCRIPTION - FREQUENCY: FREQUENCY: CONTINUOUS

## 2024-04-20 ASSESSMENT — PAIN SCALES - GENERAL
PAINLEVEL_OUTOF10: 0
PAINLEVEL_OUTOF10: 0
PAINLEVEL_OUTOF10: 2

## 2024-04-20 ASSESSMENT — PAIN DESCRIPTION - PAIN TYPE: TYPE: CHRONIC PAIN

## 2024-04-20 ASSESSMENT — PAIN DESCRIPTION - LOCATION: LOCATION: GENERALIZED

## 2024-04-20 ASSESSMENT — PAIN - FUNCTIONAL ASSESSMENT: PAIN_FUNCTIONAL_ASSESSMENT: ACTIVITIES ARE NOT PREVENTED

## 2024-04-20 ASSESSMENT — PAIN DESCRIPTION - ORIENTATION: ORIENTATION: RIGHT;LEFT

## 2024-04-20 ASSESSMENT — PAIN DESCRIPTION - ONSET: ONSET: ON-GOING

## 2024-04-20 NOTE — H&P
[facility] Hospice Admission Note        Patient:   El Leo  YOB: 1945  Age:  78 y.o.  Room:  [address] [place] [address]  MRN:  [id number]   Acct: [id number]  PCP: Main Caldwell DO    Date of Admission:  4/19/2024  8:03 AM  Date of Service:  4/19/2024    Subjective   Chief Complaint: T12 compression fracture, initial encounter ([facility])     History Obtained From:- Patient, Electronic Medical Record, Patient's RN, Hospice RN    History of Present Illness: El Leo is a 78 y.o. male who  has a past medical history of Alzheimer's type dementia ([facility]), Asbestosis ([facility]), ASHD (arteriosclerotic heart disease), Atrial fibrillation ([facility]), COPD (chronic obstructive pulmonary disease) ([facility]), DDD (degenerative disc disease), lumbar, DM2 (diabetes mellitus, type 2) ([facility]), Dyslipidemia, GERD (gastroesophageal reflux disease), H/O sick sinus syndrome, History of CVA (cerebrovascular accident), History of kidney stones, Hypertension, essential, Obesity (BMI 30-39.9), USMAN on CPAP, and Osteoarthritis of carpometacarpal (CMC) joint of right thumb. They are admitted to General Inpatient Hospice at [facility] with a hospice terminal diagnosis of Fall resulting in multiple spinal fractures. They are presenting for their first benefit period. The patient is not minimally responsive. There was family present at the bedside. Patient initially presented to the emergency department on April 19, 2024 after being found down in his nursing home.  He was recently discharged from [facility] on April 18, 2024 after being hospitalized with altered mental status, hallucination with concerns for normal pressure hydrocephalus.  He was down at the nursing home for an unknown period of time.  EMS was called and he was brought to the emergency department for further evaluation.  Workup in the emergency department was significant for a CT chest that showed a moderate T12 compression fracture.   and the family no longer wish to pursue aggressive medical treatment and would like to change goals of care to comfort.  They are agreeable to hospice.  Due to his acute fractures we will admitted to inpatient hospice for management related to the fractures.  He is likely that he is going to develop pain due to his acute fractures.  He will most likely return to the community following the stay in the hospital.    Principal Problem:    T12 compression fracture, initial encounter ([facility])  Active Problems:    Asbestosis ([facility])    Atrial fibrillation ([facility])    COPD (chronic obstructive pulmonary disease) ([facility])    DM2 (diabetes mellitus, type 2) ([facility])    GERD (gastroesophageal reflux disease)    H/O sick sinus syndrome    History of CVA (cerebrovascular accident)    Hypertension, essential    USMAN on CPAP    Altered mental status    Alzheimer's type dementia ([facility])    Physical deconditioning    Frequent falls    T12 compression fracture ([facility])    T11 vertebral fracture ([facility])    Hospice care patient  Resolved Problems:    * No resolved hospital problems. *     Plan to transition to comfort care and admit to GIP  Change code status to DNR CC  Start Morphine IR 5 mg and Morphine IV 2 mg every 1 hour as needed for breakthrough pain, shortness of breath, and CPOT greater than 0 and RDOS greater than 2  Start Ativan PO 0.5 mg and Ativan IV 0.5 mg every 1 hour as needed for breakthrough anxiety and shortness of breath not controlled on opioids  Start Haldol 2 mg oral and IV every 4 hours as needed for  agitation and restlessness  Start Glycopyrrolate 0.2 mg and Levsin 125 mcg every 2 hours as needed for  terminal secretions  Start MiraLAX and senna daily as needed for constipation  Continue home Norvasc 5 mg daily, Eliquis 5 mg twice a day, Lipitor 80 mg daily, Aricept 5 mg nightly, iron 65 mg daily, hydrochlorothiazide 25 mg daily, lisinopril 20 mg daily, metformin 500 mg daily with breakfast and 250 mg with dinner, metoprolol  12.5

## 2024-04-20 NOTE — PROGRESS NOTES
hospice visit made to assess patient and symptoms, provide support, and assist with patient care as needed. Hospice aides visited and provide personal care. Doses of ativan 0.5 mg given at 0226 for noted anxiety.   Patient able to eat breakfast this morning, primary nurse with no concerns at this time.   Candido resting in bed with eyes closed, did not open to name being said. Respirations easy and unlabored. Placed stethoscope to listen to lungs, Candido opened eyes. Pleasant, bit disoriented being woke up, easily oriented. Oriented x 3 at this time. No adverse symptoms with denial of pain. No negative findings.   Call placed to daughter who arrived to floor for visit. Discussed with her how patient is doing and wishes if continues to do well. She reports that they will still need placement and okay with return to [facility] with discharge. Concern of cost of room and board out of pocket until Medicaid would come back. Let her know that will have hospice SW speak with facility to see if they would accept medicaid pending rather than patient having to go skilled at first. They do not have money for payment out of pocket but would rather him not have to do therapies. SW to call facility on Monday to see about accepting without doing therapies, if will take medicaid pending.    Let Janet know that she will be updated of Monday of what facility says. Did talk with her about heart issues without monitoring and how Candido's condition could change. She voiced understanding of this. Told her that will figure out plan on Monday. Support provided.   Medication rec done, Dr. Colorado updated so could continue oral medications as patient swallowing okay at this time.   Hospice hha visited to provide personal care. Primary nurse Sofia denied concerns or need for assistance with patient care.     IP Admit Date: 04.19.2024     Terminal Diagnosis: T12 compression fracture, initial encounter      Secondary Diagnosis:      Asbestosis

## 2024-04-20 NOTE — PROGRESS NOTES
Pt is an 78y.o. male, NR/sleeping in 5K-14. No family were present;  prayed for pt in the doorway to his room. Pt was recently remanded to hospice care with family conversation earlier today.     04/20/24 1513   Encounter Summary   Encounter Overview/Reason  Attempted Encounter   Service Provided For: Patient   Referral/Consult From: Multi-disciplinary team   Support System Family members   Last Encounter  04/20/24  (NR-unclear if sleeping or not)   Complexity of Encounter Low   Begin Time 1334   End Time  1336   Total Time Calculated 2 min   Assessment/Intervention/Outcome   Assessment Unable to assess   Intervention Prayer (assurance of)/[place]   Outcome Did not respond

## 2024-04-21 LAB
GLUCOSE BLD STRIP.AUTO-MCNC: 171 MG/DL (ref 70–108)
GLUCOSE BLD STRIP.AUTO-MCNC: 195 MG/DL (ref 70–108)
GLUCOSE BLD STRIP.AUTO-MCNC: 196 MG/DL (ref 70–108)
GLUCOSE BLD STRIP.AUTO-MCNC: 280 MG/DL (ref 70–108)

## 2024-04-21 PROCEDURE — 82948 REAGENT STRIP/BLOOD GLUCOSE: CPT

## 2024-04-21 PROCEDURE — 1200000000 HC SEMI PRIVATE

## 2024-04-21 PROCEDURE — 6370000000 HC RX 637 (ALT 250 FOR IP): Performed by: STUDENT IN AN ORGANIZED HEALTH CARE EDUCATION/TRAINING PROGRAM

## 2024-04-21 PROCEDURE — 2580000003 HC RX 258: Performed by: STUDENT IN AN ORGANIZED HEALTH CARE EDUCATION/TRAINING PROGRAM

## 2024-04-21 PROCEDURE — 94640 AIRWAY INHALATION TREATMENT: CPT

## 2024-04-21 RX ADMIN — OLANZAPINE 10 MG: 10 TABLET, FILM COATED ORAL at 20:57

## 2024-04-21 RX ADMIN — ALBUTEROL SULFATE 2 PUFF: 90 AEROSOL, METERED RESPIRATORY (INHALATION) at 08:37

## 2024-04-21 RX ADMIN — APIXABAN 5 MG: 5 TABLET, FILM COATED ORAL at 20:57

## 2024-04-21 RX ADMIN — METFORMIN HYDROCHLORIDE 250 MG: 500 TABLET ORAL at 18:00

## 2024-04-21 RX ADMIN — METOPROLOL TARTRATE 12.5 MG: 25 TABLET, FILM COATED ORAL at 20:57

## 2024-04-21 RX ADMIN — HALOPERIDOL 2 MG: 2 SOLUTION ORAL at 01:32

## 2024-04-21 RX ADMIN — MOMETASONE FUROATE AND FORMOTEROL FUMARATE DIHYDRATE 2 PUFF: 200; 5 AEROSOL RESPIRATORY (INHALATION) at 08:37

## 2024-04-21 RX ADMIN — MOMETASONE FUROATE AND FORMOTEROL FUMARATE DIHYDRATE 2 PUFF: 200; 5 AEROSOL RESPIRATORY (INHALATION) at 18:14

## 2024-04-21 RX ADMIN — SODIUM CHLORIDE, PRESERVATIVE FREE 10 ML: 5 INJECTION INTRAVENOUS at 09:15

## 2024-04-21 RX ADMIN — ALBUTEROL SULFATE 2 PUFF: 90 AEROSOL, METERED RESPIRATORY (INHALATION) at 18:14

## 2024-04-21 RX ADMIN — SODIUM CHLORIDE, PRESERVATIVE FREE 10 ML: 5 INJECTION INTRAVENOUS at 21:00

## 2024-04-21 RX ADMIN — PANTOPRAZOLE SODIUM 40 MG: 40 TABLET, DELAYED RELEASE ORAL at 05:46

## 2024-04-21 RX ADMIN — INSULIN GLARGINE 20 UNITS: 100 INJECTION, SOLUTION SUBCUTANEOUS at 20:58

## 2024-04-21 RX ADMIN — DONEPEZIL HYDROCHLORIDE 5 MG: 5 TABLET, FILM COATED ORAL at 20:58

## 2024-04-21 ASSESSMENT — PAIN SCALES - GENERAL: PAINLEVEL_OUTOF10: 0

## 2024-04-21 NOTE — PROGRESS NOTES
SN hospice nurse visit made to assess patient and symptoms, provide support, and assist with care as needed. Patient with need of dose of haldol 2 mg at 0132 this morning for restlessness. Discussed with primary nurse who denied need for assistance with patient care.   Patient lying in bed, primary nurse reported that he had been sleeping this morning with being difficult to wake up. Wondering if possible after effect of haldol. Patient difficult to wake but did to sternal rub, lethargic. Alert x 2, vital signs WDL, and blood glucose 195. Denied pain or other adverse symptoms.   Discussed with daughter Janet about patient being stable, wish to monitor for another night but will have to move forward with placement with change of status to routine level of care. She voiced understanding of this. Wish for [facility] with hospice if they will take medicaid pending. If they will not then will want him to go skilled to have room and board paid for, for period of time. Let her know that hospice SW to make call to discuss with [facility] on Monday. Did discuss uncertainty of patient heart issue with no work up and may become symptomatic or something may happen without warning/changes. She voiced understanding of this and part of reason that she just wants him to go to facility with hospice.   Updated primary nurse and Dr. Colorado of plan for placement.   Hospice [facility] provided personal care.     GIP Admit Date: 04.19.2024     Terminal Diagnosis: T12 compression fracture, initial encounter      Secondary Diagnosis:      Asbestosis (HCC)      ASHD (arteriosclerotic heart disease)    Atrial fibrillation (HCC)    COPD (chronic obstructive pulmonary disease) (HCC)    DDD (degenerative disc disease), lumbar    DM2 (diabetes mellitus, type 2) (HCC)    Dyslipidemia    GERD (gastroesophageal reflux disease)    H/O sick sinus syndrome    History of CVA (cerebrovascular accident)    Hypertension, essential    Obesity (BMI

## 2024-04-21 NOTE — RT PROTOCOL NOTE
RT Inhaler-Nebulizer Bronchodilator Protocol Note    There is a bronchodilator order in the chart from a provider indicating to follow the RT Bronchodilator Protocol and there is an “Initiate RT Inhaler-Nebulizer Bronchodilator Protocol” order as well (see protocol at bottom of note).    CXR Findings:  XR CHEST PORTABLE    Result Date: 4/19/2024  1. Mild bibasilar subsegmental atelectasis. **This report has been created using voice recognition software.  It may contain minor errors which are inherent in voice recognition technology.** Final report electronically signed by Dr Pavithra Snyder on 4/19/2024 9:01 AM      The findings from the last RT Protocol Assessment were as follows:   History Pulmonary Disease: Chronic pulmonary disease  Respiratory Pattern: Regular pattern and RR 12-20 bpm  Breath Sounds: Slightly diminished and/or crackles  Cough: Strong, spontaneous, non-productive  Indication for Bronchodilator Therapy: Decreased or absent breath sounds  Bronchodilator Assessment Score: 4    Aerosolized bronchodilator medication orders have been revised according to the RT Inhaler-Nebulizer Bronchodilator Protocol below.    Respiratory Therapist to perform RT Therapy Protocol Assessment initially then follow the protocol.  Repeat RT Therapy Protocol Assessment PRN for score 0-3 or on second treatment, BID, and PRN for scores above 3.    No Indications - adjust the frequency to every 6 hours PRN wheezing or bronchospasm, if no treatments needed after 48 hours then discontinue using Per Protocol order mode.     If indication present, adjust the RT bronchodilator orders based on the Bronchodilator Assessment Score as indicated below.  Use Inhaler orders unless patient has one or more of the following: on home nebulizer, not able to hold breath for 10 seconds, is not alert and oriented, cannot activate and use MDI correctly, or respiratory rate 25 breaths per minute or more, then use the equivalent nebulizer order(s)

## 2024-04-22 LAB
GLUCOSE BLD STRIP.AUTO-MCNC: 149 MG/DL (ref 70–108)
GLUCOSE BLD STRIP.AUTO-MCNC: 231 MG/DL (ref 70–108)
GLUCOSE BLD STRIP.AUTO-MCNC: 242 MG/DL (ref 70–108)
GLUCOSE BLD STRIP.AUTO-MCNC: 315 MG/DL (ref 70–108)

## 2024-04-22 PROCEDURE — 1200000000 HC SEMI PRIVATE

## 2024-04-22 PROCEDURE — 6360000002 HC RX W HCPCS: Performed by: STUDENT IN AN ORGANIZED HEALTH CARE EDUCATION/TRAINING PROGRAM

## 2024-04-22 PROCEDURE — 99233 SBSQ HOSP IP/OBS HIGH 50: CPT | Performed by: STUDENT IN AN ORGANIZED HEALTH CARE EDUCATION/TRAINING PROGRAM

## 2024-04-22 PROCEDURE — 82948 REAGENT STRIP/BLOOD GLUCOSE: CPT

## 2024-04-22 PROCEDURE — 2580000003 HC RX 258: Performed by: STUDENT IN AN ORGANIZED HEALTH CARE EDUCATION/TRAINING PROGRAM

## 2024-04-22 PROCEDURE — 94640 AIRWAY INHALATION TREATMENT: CPT

## 2024-04-22 PROCEDURE — 6370000000 HC RX 637 (ALT 250 FOR IP): Performed by: STUDENT IN AN ORGANIZED HEALTH CARE EDUCATION/TRAINING PROGRAM

## 2024-04-22 RX ADMIN — MORPHINE SULFATE 2 MG: 2 INJECTION, SOLUTION INTRAMUSCULAR; INTRAVENOUS at 22:17

## 2024-04-22 RX ADMIN — INSULIN LISPRO 3 UNITS: 100 INJECTION, SOLUTION INTRAVENOUS; SUBCUTANEOUS at 12:24

## 2024-04-22 RX ADMIN — APIXABAN 5 MG: 5 TABLET, FILM COATED ORAL at 10:11

## 2024-04-22 RX ADMIN — PANTOPRAZOLE SODIUM 40 MG: 40 TABLET, DELAYED RELEASE ORAL at 06:23

## 2024-04-22 RX ADMIN — SODIUM CHLORIDE, PRESERVATIVE FREE 10 ML: 5 INJECTION INTRAVENOUS at 12:24

## 2024-04-22 RX ADMIN — MOMETASONE FUROATE AND FORMOTEROL FUMARATE DIHYDRATE 2 PUFF: 200; 5 AEROSOL RESPIRATORY (INHALATION) at 16:24

## 2024-04-22 RX ADMIN — HYDROCHLOROTHIAZIDE 25 MG: 25 TABLET ORAL at 10:10

## 2024-04-22 RX ADMIN — AMLODIPINE BESYLATE 5 MG: 5 TABLET ORAL at 10:10

## 2024-04-22 RX ADMIN — LISINOPRIL 20 MG: 20 TABLET ORAL at 10:10

## 2024-04-22 RX ADMIN — INSULIN GLARGINE 20 UNITS: 100 INJECTION, SOLUTION SUBCUTANEOUS at 19:39

## 2024-04-22 RX ADMIN — METFORMIN HYDROCHLORIDE 500 MG: 500 TABLET ORAL at 10:09

## 2024-04-22 RX ADMIN — APIXABAN 5 MG: 5 TABLET, FILM COATED ORAL at 19:39

## 2024-04-22 RX ADMIN — ATORVASTATIN CALCIUM 80 MG: 80 TABLET, FILM COATED ORAL at 10:10

## 2024-04-22 RX ADMIN — METFORMIN HYDROCHLORIDE 250 MG: 500 TABLET ORAL at 17:51

## 2024-04-22 RX ADMIN — Medication 250 MCG: at 10:10

## 2024-04-22 RX ADMIN — FERROUS SULFATE TAB 325 MG (65 MG ELEMENTAL FE) 325 MG: 325 (65 FE) TAB at 10:10

## 2024-04-22 RX ADMIN — METOPROLOL TARTRATE 12.5 MG: 25 TABLET, FILM COATED ORAL at 10:10

## 2024-04-22 RX ADMIN — INSULIN GLARGINE 20 UNITS: 100 INJECTION, SOLUTION SUBCUTANEOUS at 10:09

## 2024-04-22 RX ADMIN — Medication 250 MG: at 10:11

## 2024-04-22 RX ADMIN — Medication 2000 UNITS: at 10:10

## 2024-04-22 RX ADMIN — ACETAMINOPHEN 650 MG: 325 TABLET ORAL at 10:09

## 2024-04-22 RX ADMIN — MOMETASONE FUROATE AND FORMOTEROL FUMARATE DIHYDRATE 2 PUFF: 200; 5 AEROSOL RESPIRATORY (INHALATION) at 07:36

## 2024-04-22 RX ADMIN — DONEPEZIL HYDROCHLORIDE 5 MG: 5 TABLET, FILM COATED ORAL at 19:39

## 2024-04-22 RX ADMIN — ALBUTEROL SULFATE 2 PUFF: 90 AEROSOL, METERED RESPIRATORY (INHALATION) at 16:24

## 2024-04-22 RX ADMIN — ALBUTEROL SULFATE 2 PUFF: 90 AEROSOL, METERED RESPIRATORY (INHALATION) at 07:37

## 2024-04-22 RX ADMIN — INSULIN LISPRO 1 UNITS: 100 INJECTION, SOLUTION INTRAVENOUS; SUBCUTANEOUS at 17:51

## 2024-04-22 RX ADMIN — METOPROLOL TARTRATE 12.5 MG: 25 TABLET, FILM COATED ORAL at 19:38

## 2024-04-22 RX ADMIN — OLANZAPINE 10 MG: 10 TABLET, FILM COATED ORAL at 19:39

## 2024-04-22 RX ADMIN — SODIUM CHLORIDE, PRESERVATIVE FREE 10 ML: 5 INJECTION INTRAVENOUS at 19:39

## 2024-04-22 ASSESSMENT — LIFESTYLE VARIABLES
HOW MANY STANDARD DRINKS CONTAINING ALCOHOL DO YOU HAVE ON A TYPICAL DAY: PATIENT DOES NOT DRINK
HOW OFTEN DO YOU HAVE A DRINK CONTAINING ALCOHOL: NEVER

## 2024-04-22 ASSESSMENT — PAIN DESCRIPTION - LOCATION
LOCATION: BACK
LOCATION: BACK;ABDOMEN

## 2024-04-22 ASSESSMENT — PATIENT HEALTH QUESTIONNAIRE - PHQ9
SUM OF ALL RESPONSES TO PHQ9 QUESTIONS 1 & 2: 2
SUM OF ALL RESPONSES TO PHQ QUESTIONS 1-9: 2
SUM OF ALL RESPONSES TO PHQ QUESTIONS 1-9: 2
1. LITTLE INTEREST OR PLEASURE IN DOING THINGS: SEVERAL DAYS
2. FEELING DOWN, DEPRESSED OR HOPELESS: SEVERAL DAYS
SUM OF ALL RESPONSES TO PHQ QUESTIONS 1-9: 2
SUM OF ALL RESPONSES TO PHQ QUESTIONS 1-9: 2

## 2024-04-22 ASSESSMENT — PAIN - FUNCTIONAL ASSESSMENT: PAIN_FUNCTIONAL_ASSESSMENT: ACTIVITIES ARE NOT PREVENTED

## 2024-04-22 ASSESSMENT — PAIN DESCRIPTION - ORIENTATION: ORIENTATION: LOWER

## 2024-04-22 ASSESSMENT — PAIN DESCRIPTION - DESCRIPTORS: DESCRIPTORS: ACHING

## 2024-04-22 ASSESSMENT — PAIN SCALES - GENERAL
PAINLEVEL_OUTOF10: 6
PAINLEVEL_OUTOF10: 4

## 2024-04-22 NOTE — PROGRESS NOTES
[facility] Hospice Progress Note        Patient:   lE Leo  YOB: 1945  Age:  78 y.o.  Room:  [facility]  MRN:  [id number]   Acct: [id number]  PCP: Main Caldwell DO    Date of Admission:  4/19/2024  8:03 AM  Date of Service:  4/22/2024    Subjective   Chief Complaint: T12 compression fracture, initial encounter ([facility])     History Obtained From:-  Patient, Electronic Medical Record, Patient's RN, Hospice RN    History of Present Illness: El Leo is a 78 y.o. male who is admitted to General Inpatient Hospice at [facility] with a hospice terminal diagnosis of Fall resulting in multiple spinal fractures. They are GIP day number three.  Patient was seen in their room today by the hospice team.  There was not family present at the bedside.  The patient is not minimally responsive.  They have Olanzapine 10 mg nightly  as scheduled medications for symptom relief.  From 8 AM yesterday to 8 AM today, they have required No Medications for breakthrough symptom relief.  These medications including changes to the scheduled medications are controlling their symptoms.  The patient is eating. They have not had a bowel movement in the last 24 hours documented. Candido is doing relatively well today.  He denies having any pain.  He has not moved around much or so he does not know to when he moves.  Have any shortness of breath, nausea, vomiting or difficulty sleeping.  His nurse had no concerns for me today.      Scheduled Medications:   amLODIPine  5 mg Oral Daily    apixaban  5 mg Oral BID    atorvastatin  80 mg Oral Daily    mometasone-formoterol  2 puff Inhalation BID RT    cyanocobalamin  250 mcg Oral Daily    donepezil  5 mg Oral Nightly    ferrous sulfate  325 mg Oral Daily    insulin glargine  20 Units SubCUTAneous BID    lisinopril  20 mg Oral Daily    hydroCHLOROthiazide  25 mg Oral Daily    metFORMIN  500 mg Oral Daily with breakfast    OLANZapine  10 mg Oral

## 2024-04-22 NOTE — PLAN OF CARE
Problem: Respiratory - Adult  Goal: Achieves optimal ventilation and oxygenation  Flowsheets (Taken 4/22/2024 0906)  Achieves optimal ventilation and oxygenation:   Assess for changes in respiratory status   Respiratory therapy support as indicated   Position to facilitate oxygenation and minimize respiratory effort   Assess for changes in mentation and behavior   Oxygen supplementation based on oxygen saturation or arterial blood gases   Assess and instruct to report shortness of breath or any respiratory difficulty   Continue therapy as ordered to achieve and maintain clear breath sounds and improve aeration. Pt  agrees with the plan.

## 2024-04-22 NOTE — TELEPHONE ENCOUNTER
Soon after this message was sent  Pt had fall, and was admitted to [facility] IP hospice.

## 2024-04-22 NOTE — PROGRESS NOTES
Brief Intervention and Referral to Treatment Summary    Patient was provided PHQ-9, AUDIT-C and DAST Screening:      PHQ-9 Score: 2  AUDIT-C Score:  0  DAST Score:  0    Patient’s substance use is considered     Low Risk/Healthy      Patient’s depression is considered:     Minimal    Brief Education Was Provided          Brief Intervention Is Provided (Only for AUDIT-C or DAST)     Patient reports readiness to decrease and/or stop use and a plan was discussed   Patient denies readiness to decrease and/or stop use and a plan was not discussed      Injured Trauma Survivor Screening  1.  When you were injured or right afterward   Did you think you were going to die? RESPONSES; YES+1/NO: NO  Do you think this was done to you intentionally? NO    Since your injury  Have you felt more restless, tense or jumpy than usual? RESPONSES; YES+1/NO: NO  Have you found yourself unable to stop worrying? RESPONSES; YES+1/NO: NO  Do you find yourself thinking that the world is unsafe and that people are not to be trusted? RESPONSES; YES+1/NO: NO    TOTAL SCORE from ITSS Questions 1 and 2: 0  NOTE: A score of greater than or equal to 2 is considered positive for PTSD risk and is to receive a community resource packet to link with appropriate providers.    Recommendations/Referrals for Brief and/or Specialized Treatment Provided to Patient:

## 2024-04-22 NOTE — PROGRESS NOTES
hospice nurse visit made to assess patient and symptoms, provide support, and assist with care as needed. Patient able to rest through night with no anxiety/agitation.   Candido calling out for assistance with getting urinal. Primary nurse assisted as this nurse straightened bed linens. Candido repositioned in bed and sat up for lunch tray. Patient pleasant, denied pain or other adverse symptoms. Able to set tray up himself. He denied needs at this time.   TONY Milly called [facility] and updated of families' wish for him to return there and prefers to go hospice with medicaid pending if they will accept. They are checking on.   Call placed to daughter Janet to update her that waiting to be updated from [facility] and this is why TONY had not called her back. Shared that Candido doing well with no issues. No questions for this nurse at this time, will speak with her on [phone].   PT/OT order obtained from Dr. Colorado, to see how he moves and incase needed for pre cert process.   Primary nurse Tiana SANTAMARIA updated of plan at this time.     GIP Admit Date: 04.19.2024     Terminal Diagnosis: T12 compression fracture, initial encounter      Secondary Diagnosis:      Asbestosis ([facility])      ASHD (arteriosclerotic heart disease)    Atrial fibrillation (HCC)    COPD (chronic obstructive pulmonary disease) (HCC)    DDD (degenerative disc disease), lumbar    DM2 (diabetes mellitus, type 2) ([facility])    Dyslipidemia    GERD (gastroesophageal reflux disease)    H/O sick sinus syndrome    History of CVA (cerebrovascular accident)    Hypertension, essential    Obesity (BMI 30-39.9)    USMAN on CPAP    Osteoarthritis of carpometacarpal (CMC) joint of right thumb    Balance problem    Chronic vertigo    Elevated PSA    History of kidney stones    Delirium    Altered mental status    Moderate late onset Alzheimer's dementia with agitation ([facility])         Reviewed Nursing Notes for Previous 24 hours of Inpatient Charting:       Pain Scale:    PRN  dextrose bolus 10% 125 mL, 125 mL, IntraVENous, PRN **OR** dextrose bolus 10% 250 mL, 250 mL, IntraVENous, PRN  glucagon injection 1 mg, 1 mg, SubCUTAneous, PRN  dextrose 10 % infusion, , IntraVENous, Continuous PRN  LORazepam (ATIVAN) tablet 0.5 mg, 0.5 mg, Oral, Q1H PRN  senna (SENOKOT) tablet 8.6 mg, 1 tablet, Oral, Nightly PRN  polyethylene glycol (GLYCOLAX) packet 17 g, 17 g, Oral, Daily PRN  metFORMIN (GLUCOPHAGE) tablet 250 mg, 250 mg, Oral, Dinner  magnesium oxide (MAG-OX) tablet 250 mg, 250 mg, Oral, Daily  albuterol sulfate HFA (PROVENTIL;VENTOLIN;PROAIR) 108 (90 Base) MCG/ACT inhaler 2 puff, 2 puff, Inhalation, BID RT  sodium chloride flush 0.9 % injection 5-40 mL, 5-40 mL, IntraVENous, 2 times per day  sodium chloride flush 0.9 % injection 5-40 mL, 5-40 mL, IntraVENous, PRN  0.9 % sodium chloride infusion, , IntraVENous, PRN  acetaminophen (TYLENOL) tablet 650 mg, 650 mg, Oral, Q4H PRN  haloperidol lactate (HALDOL) injection 2 mg, 2 mg, IntraVENous, Q4H PRN  LORazepam (ATIVAN) injection 0.5 mg, 0.5 mg, IntraVENous, Q1H PRN  haloperidol (HALDOL) 2 MG/ML oral solution 2 mg, 2 mg, Oral, Q4H PRN  glycopyrrolate (ROBINUL) injection 0.2 mg, 0.2 mg, IntraVENous, Q2H PRN  hyoscyamine (OSCIMIN) dispersible tablet 125 mcg, 125 mcg, Oral, Q2H PRN  morphine (PF) injection 2 mg, 2 mg, IntraVENous, Q1H PRN  morphine 20MG/ML concentrated solution 5 mg, 5 mg, Oral, Q1H PRN  acetaminophen (TYLENOL) suppository 650 mg, 650 mg, Rectal, Q4H PRN     Medication Adjustments: None at this time    Reviewed Physician Orders and Progress Notes:   Since nurse visit 04.21.2024 including MAR and flow sheets   Reviewed Plan of Care with:   daughter Janet, Dr. Colorado, TONY Adan, and primary nurse Tiana     Spiritual needs addressed:  hospice chaplain made visit     Level of Care still Appropriate because:       Pt actively dying: No    Signs/Symptoms not able to control at home:   Patient with fall with compression fracture,

## 2024-04-22 NOTE — PROGRESS NOTES
04/22/24 1046   Encounter Summary   Encounter Overview/Reason  Grief, Loss, and Adjustments   Service Provided For: Patient   Referral/Consult From: Hospice   Support System Spouse;Children   Last Encounter  04/22/24   Complexity of Encounter Low   Begin Time 1016   End Time  1035   Total Time Calculated 19 min   Grief, Loss, and Adjustments   Type Hospice;Adjustment to illness   Assessment/Intervention/Outcome   Assessment Calm;Coping;Other (Comment)  (some confusion)   Intervention Active listening;Prayer (assurance of)/[place];Sustaining Presence/Ministry of presence   Outcome Engaged in conversation     Hospice chaplain made an initial visit to introduce self, role and assess spiritual needs. Patient rested in bed, alert, though somewhat confused. Patient shared that he met his wife at a soda fountain back in the late 50's,  a few years later and started a family. He spoke proudly of his son's ability to work on cars and restore them, in [place]. He acknowledged that both are in failing health, and during his hospital stay has been the longest they've been apart. Though not Episc[employer]alian, patient shared a strong belief in [place] and described being at peace in his relationship with God. Spiritual health remains available.

## 2024-04-23 LAB
GLUCOSE BLD STRIP.AUTO-MCNC: 186 MG/DL (ref 70–108)
GLUCOSE BLD STRIP.AUTO-MCNC: 191 MG/DL (ref 70–108)
GLUCOSE BLD STRIP.AUTO-MCNC: 245 MG/DL (ref 70–108)

## 2024-04-23 PROCEDURE — 2580000003 HC RX 258: Performed by: STUDENT IN AN ORGANIZED HEALTH CARE EDUCATION/TRAINING PROGRAM

## 2024-04-23 PROCEDURE — 6370000000 HC RX 637 (ALT 250 FOR IP): Performed by: STUDENT IN AN ORGANIZED HEALTH CARE EDUCATION/TRAINING PROGRAM

## 2024-04-23 PROCEDURE — 99233 SBSQ HOSP IP/OBS HIGH 50: CPT | Performed by: STUDENT IN AN ORGANIZED HEALTH CARE EDUCATION/TRAINING PROGRAM

## 2024-04-23 PROCEDURE — 97530 THERAPEUTIC ACTIVITIES: CPT

## 2024-04-23 PROCEDURE — 94640 AIRWAY INHALATION TREATMENT: CPT

## 2024-04-23 PROCEDURE — 97166 OT EVAL MOD COMPLEX 45 MIN: CPT

## 2024-04-23 PROCEDURE — 97162 PT EVAL MOD COMPLEX 30 MIN: CPT

## 2024-04-23 PROCEDURE — 1200000000 HC SEMI PRIVATE

## 2024-04-23 PROCEDURE — 82948 REAGENT STRIP/BLOOD GLUCOSE: CPT

## 2024-04-23 RX ADMIN — INSULIN GLARGINE 20 UNITS: 100 INJECTION, SOLUTION SUBCUTANEOUS at 08:28

## 2024-04-23 RX ADMIN — METOPROLOL TARTRATE 12.5 MG: 25 TABLET, FILM COATED ORAL at 21:03

## 2024-04-23 RX ADMIN — Medication 250 MG: at 08:28

## 2024-04-23 RX ADMIN — HYDROCHLOROTHIAZIDE 25 MG: 25 TABLET ORAL at 08:28

## 2024-04-23 RX ADMIN — PANTOPRAZOLE SODIUM 40 MG: 40 TABLET, DELAYED RELEASE ORAL at 05:03

## 2024-04-23 RX ADMIN — METOPROLOL TARTRATE 12.5 MG: 25 TABLET, FILM COATED ORAL at 08:28

## 2024-04-23 RX ADMIN — ATORVASTATIN CALCIUM 80 MG: 80 TABLET, FILM COATED ORAL at 08:28

## 2024-04-23 RX ADMIN — SODIUM CHLORIDE, PRESERVATIVE FREE 10 ML: 5 INJECTION INTRAVENOUS at 08:32

## 2024-04-23 RX ADMIN — DONEPEZIL HYDROCHLORIDE 5 MG: 5 TABLET, FILM COATED ORAL at 21:03

## 2024-04-23 RX ADMIN — OLANZAPINE 10 MG: 10 TABLET, FILM COATED ORAL at 21:03

## 2024-04-23 RX ADMIN — APIXABAN 5 MG: 5 TABLET, FILM COATED ORAL at 08:28

## 2024-04-23 RX ADMIN — METFORMIN HYDROCHLORIDE 500 MG: 500 TABLET ORAL at 08:28

## 2024-04-23 RX ADMIN — Medication 250 MCG: at 08:28

## 2024-04-23 RX ADMIN — ALBUTEROL SULFATE 2 PUFF: 90 AEROSOL, METERED RESPIRATORY (INHALATION) at 17:20

## 2024-04-23 RX ADMIN — LISINOPRIL 20 MG: 20 TABLET ORAL at 08:28

## 2024-04-23 RX ADMIN — AMLODIPINE BESYLATE 5 MG: 5 TABLET ORAL at 08:28

## 2024-04-23 RX ADMIN — ALBUTEROL SULFATE 2 PUFF: 90 AEROSOL, METERED RESPIRATORY (INHALATION) at 07:31

## 2024-04-23 RX ADMIN — APIXABAN 5 MG: 5 TABLET, FILM COATED ORAL at 21:03

## 2024-04-23 RX ADMIN — Medication 2000 UNITS: at 08:28

## 2024-04-23 RX ADMIN — FERROUS SULFATE TAB 325 MG (65 MG ELEMENTAL FE) 325 MG: 325 (65 FE) TAB at 08:28

## 2024-04-23 RX ADMIN — SODIUM CHLORIDE, PRESERVATIVE FREE 10 ML: 5 INJECTION INTRAVENOUS at 21:03

## 2024-04-23 RX ADMIN — INSULIN GLARGINE 20 UNITS: 100 INJECTION, SOLUTION SUBCUTANEOUS at 21:03

## 2024-04-23 RX ADMIN — METFORMIN HYDROCHLORIDE 250 MG: 500 TABLET ORAL at 19:15

## 2024-04-23 RX ADMIN — MOMETASONE FUROATE AND FORMOTEROL FUMARATE DIHYDRATE 2 PUFF: 200; 5 AEROSOL RESPIRATORY (INHALATION) at 07:31

## 2024-04-23 RX ADMIN — MOMETASONE FUROATE AND FORMOTEROL FUMARATE DIHYDRATE 2 PUFF: 200; 5 AEROSOL RESPIRATORY (INHALATION) at 17:20

## 2024-04-23 ASSESSMENT — PAIN DESCRIPTION - ORIENTATION: ORIENTATION: LOWER;MID

## 2024-04-23 ASSESSMENT — PAIN DESCRIPTION - LOCATION: LOCATION: BACK

## 2024-04-23 ASSESSMENT — PAIN DESCRIPTION - DESCRIPTORS: DESCRIPTORS: ACHING

## 2024-04-23 ASSESSMENT — PAIN SCALES - GENERAL: PAINLEVEL_OUTOF10: 4

## 2024-04-23 NOTE — PROGRESS NOTES
[facility]  INPATIENT PHYSICAL THERAPY  EVALUATION  [facility] ONC MED 5K - 5K-14/014-A    Time In: 1415  Time Out: 1435  Timed Code Treatment Minutes: 10 Minutes  Minutes: 20          Date: 2024  Patient Name: El Leo,  Gender:  male        MRN: [id number]  : 1945  (78 y.o.)      Referring Practitioner: Angelo Colorado MD  Diagnosis: T12 compression fracture  Additional Pertinent Hx: From H&P:  El Leo is a 78 y.o. male who  has a past medical history of Alzheimer's type dementia ([facility]), Asbestosis (HCC), ASHD (arteriosclerotic heart disease), Atrial fibrillation (HCC), COPD (chronic obstructive pulmonary disease) ([facility]), DDD (degenerative disc disease), lumbar, DM2 (diabetes mellitus, type 2) ([facility]), Dyslipidemia, GERD (gastroesophageal reflux disease), H/O sick sinus syndrome, History of CVA (cerebrovascular accident), History of kidney stones, Hypertension, essential, Obesity (BMI 30-39.9), USMAN on CPAP, and Osteoarthritis of carpometacarpal (CMC) joint of right thumb. They are admitted to General Inpatient Hospice at [facility] with a hospice terminal diagnosis of Fall resulting in multiple spinal fractures. They are presenting for their first benefit period. The patient is not minimally responsive. There was family present at the bedside. Patient initially presented to the emergency department on 2024 after being found down in his nursing home.  He was recently discharged from [facility] on 2024 after being hospitalized with altered mental status, hallucination with concerns for normal pressure hydrocephalus.  He was down at the nursing home for an unknown period of time.  EMS was called and he was brought to the emergency department for further evaluation.  Workup in the emergency department was significant for a CT chest that showed a moderate T12 compression fracture.  CT thoracic spine showed acute fractures of T12  Responsibilities: No  Ambulation Assistance: Independent  Transfer Assistance: Independent    Active : No  Occupation: Retired  Additional Comments: patient's spouse is a retired nurse and is unable to physically assist patient.    OBJECTIVE:  Range of Motion:  Right Lower Extremity: WFL  Left Lower Extremity: WFL    Strength:  Right Lower Extremity: Impaired - grossly 4-/5; demos generalized weakness and deconditioning  Left Lower Extremity: Impaired - grossly 4-/5; demos generalized weakness and deconditioning    Balance:  Static Sitting Balance:  Stand By Assistance  Dynamic Sitting Balance: Stand By Assistance  Static Standing Balance: Contact Guard Assistance  Dynamic Standing Balance: Contact Guard Assistance    Bed Mobility:  Supine to Sit: Stand By Assistance, with head of bed raised, with verbal cues   **cuing for log roll    Transfers:  Sit to Stand: Minimal Assistance, cues for hand placement  Stand to Sit:Minimal Assistance, cues for hand placement    Ambulation:  Minimal Assistance  Distance: 5ft  Surface: Level Tile  Device:Rolling Walker  Gait Deviations:  Forward Flexed Posture, Slow Verito, Decreased Gait Speed, and Decreased Terminal Knee Extension    Exercise:  Patient was guided in 1 set(s) 12 reps of exercise to both lower extremities.  Seated marches, Seated heel/toe raises, and Long arc quads.  Exercises were completed for increased independence with functional mobility.    Functional Outcome Measures: Completed  -PAC Inpatient Mobility Raw Score : 17  -PAC Inpatient T-Scale Score : 42.13  Modified [place] Scale:  Not Applicable    ASSESSMENT:  Activity Tolerance:  Patient tolerance of  treatment: good.       Treatment Initiated: Treatment and education initiated within context of evaluation.  Evaluation time included review of current medical information, gathering information related to past medical, social and functional history, completion of standardized testing, formal and

## 2024-04-23 NOTE — PLAN OF CARE
Problem: Safety - Adult  Goal: Free from fall injury  Outcome: Progressing  Fall assessment completed. Personal items within reach. Bed alarm on. Bed on lowest position. Video safety monitor at bedside.     Problem: Chronic Conditions and Co-morbidities  Goal: Patient's chronic conditions and co-morbidity symptoms are monitored and maintained or improved  Outcome: Progressing  Flowsheets (Taken 4/22/2024 1935)  Care Plan - Patient's Chronic Conditions and Co-Morbidity Symptoms are Monitored and Maintained or Improved:   Monitor and assess patient's chronic conditions and comorbid symptoms for stability, deterioration, or improvement   Collaborate with multidisciplinary team to address chronic and comorbid conditions and prevent exacerbation or deterioration   Update acute care plan with appropriate goals if chronic or comorbid symptoms are exacerbated and prevent overall improvement and discharge     Problem: Skin/Tissue Integrity  Goal: Absence of new skin breakdown  Description: 1.  Monitor for areas of redness and/or skin breakdown  2.  Assess vascular access sites hourly  3.  Every 4-6 hours minimum:  Change oxygen saturation probe site  4.  Every 4-6 hours:  If on nasal continuous positive airway pressure, respiratory therapy assess nares and determine need for appliance change or resting period.  Outcome: Progressing  No skin breakdown this shift. Patient able to turn self on bed. Patient states understanding of repositioning every two hours.      Problem: Pain  Goal: Verbalizes/displays adequate comfort level or baseline comfort level  Outcome: Progressing  Flowsheets (Taken 4/22/2024 1935)  Verbalizes/displays adequate comfort level or baseline comfort level:   Encourage patient to monitor pain and request assistance   Assess pain using appropriate pain scale   Implement non-pharmacological measures as appropriate and evaluate response  Pain Assessment: 0-10  Pain Level: 6   Patient's Stated Pain Goal:  Unable to verbalize/indicate pain goal   Is pain goal met at this time?  Yes     Non-Pharmaceutical Pain Intervention(s): Repositioned, Rest      Problem: Respiratory - Adult  Goal: Achieves optimal ventilation and oxygenation  4/22/2024 2255 by Paul Cavazos, RN  Outcome: Progressing  Flowsheets (Taken 4/22/2024 1935)  Achieves optimal ventilation and oxygenation:   Assess for changes in respiratory status   Assess for changes in mentation and behavior   Position to facilitate oxygenation and minimize respiratory effort   Assess and instruct to report shortness of breath or any respiratory difficulty     Problem: Discharge Planning  Goal: Discharge to home or other facility with appropriate resources  Outcome: Progressing  Flowsheets (Taken 4/22/2024 1935)  Discharge to home or other facility with appropriate resources:   Identify barriers to discharge with patient and caregiver   Arrange for needed discharge resources and transportation as appropriate   Identify discharge learning needs (meds, wound care, etc)  From [facility]. In-patient hospice. Awaiting further discharge plans and orders.    Patient on a DNR-CC status. Care plan reviewed with patient. Patient verbalizes understanding of the care plan. Comfort care measures provided to the patient.

## 2024-04-23 NOTE — PROGRESS NOTES
[facility]  INPATIENT OCCUPATIONAL THERAPY  [facility] ONC MED 5K  EVALUATION    Time:   Time In: 1409  Time Out: 1431  Timed Code Treatment Minutes: 13 Minutes  Minutes: 22      **co-tx with PT due to patient's prognosis and ability to tolerate two separate sessions. At end of session, it was determined patient does not need to be a co-tx.    Date: 2024  Patient Name: El Leo,   Gender: male      MRN: [id number]  : 1945  (78 y.o.)  Referring Practitioner: Angelo Colorado MD  Diagnosis: T12 compression fracture  Additional Pertinent Hx: per chart review; Patient presented from [facility] after just being discharged yesterday where he was admitted for altered mental status with hallucinations, dementia, confusion with a history of memory loss and dementia with behavioral disturbance was found down at the nursing home do not know why he was on the floor or how he got on the floor or how long he been on the floor but no external evidence of trauma was noted no new bruising no marks on his head no skin tears nothing.  Brought in a level 2 was called.  We evaluated in the emergency room where a FAST exam was negative full-body exam revealed no acute injury findings old bruise over his left hip area some old scars over his left hip and an old spine fixation incision.  He was in a collar but unresponsive with a GCS of 9.    Restrictions/Precautions:  Restrictions/Precautions: General Precautions, Fall Risk  Required Braces or Orthoses  Spinal: Thoracic Lumbar Sacral Orthotics  Position Activity Restriction  Spinal Precautions: No Bending, No Twisting, No Lifting    Subjective  Chart Reviewed: Yes, Orders, Progress Notes, History and Physical, Imaging  Patient assessed for rehabilitation services?: Yes  Family / Caregiver Present: No    Subjective: RN approved session, first attempt to eval patient in a.m due to patient sleeping soundly and unable to be roused to therapeutic level. second attempt in  recliner to sit safely.      AM-[facility] Inpatient Daily Activity Raw Score: 19  AM-PAC Inpatient ADL T-Scale Score : 40.22  ADL Inpatient CMS 0-100% Score: 42.8    Activity Tolerance:  Patient tolerance of  treatment: Good treatment tolerance      Modified [place] Scale:  Not Applicable    Assessment:  Assessment: patient demo overall de-conditioning and variable cognition s/p fall and admitted with T12 compression fracture impacting patient's ability to complete ADLs and functional transfers as prior and now has spinal precautions. patient would benefit from continued, skilled OT to progress toward PLOF, decrease caregiver burden and increase occupational performance.  Performance deficits / Impairments: Decreased functional mobility , Decreased endurance, Decreased coordination, Decreased ADL status, Decreased balance, Decreased cognition, Decreased safe awareness  Prognosis: Fair  REQUIRES OT FOLLOW-UP: Yes  Decision Making: Medium Complexity    Treatment Initiated: Treatment and education initiated within context of evaluation.  Evaluation time included review of current medical information, gathering information related to past medical, social and functional history, completion of standardized testing, formal and informal observation of tasks, assessment of data and development of plan of care and goals.  Treatment time included skilled education and facilitation of tasks to increase safety and independence with ADL's for improved functional independence and quality of life.    Discharge Recommendations:  Continue to assess pending progress, Patient would benefit from continued therapy after discharge, Subacute/Skilled Nursing Facility, 24 hour supervision or assist    Patient Education:        Patient Education  Education Given To: Patient  Education Provided: Role of Therapy;Fall Prevention Strategies;Plan of Care;Precautions;ADL Adaptive Strategies;Transfer Training  Barriers to Learning: Cognition    Equipment

## 2024-04-23 NOTE — PROGRESS NOTES
hospice nurse visit made to assess patient and symptoms, provide support, and assist with care as needed. No need of PRN comfort medications through last 24 hrs. No BM charted for last week. Asked primary nurse Andrae to administer prn doses of senna and glycol ax.   Candido resting in bed with eyes closed, had eaten 75 % of breakfast. Hard to wake up and engage in conversation at first, alert to name and place, not situation. Pleasant, assessment completed. No noted issues during visit.    spoke with facility and they wish for patient to come skilled and then transition to hospice due to financial reasons. Call placed to daughter Janet and updated of facility wish for him to go their with therapy. She voiced understanding of this and in agreement. Family will get hospice involved after therapies. Did explain that he has to appear to be in last 6 months of life when evaluating. She voiced understanding. Told her that will call and update her on 04.24.2024 of plan.      GIP Admit Date: 04.19.2024     Terminal Diagnosis: T12 compression fracture, initial encounter      Secondary Diagnosis:      Asbestosis ([facility])      ASHD (arteriosclerotic heart disease)    Atrial fibrillation ([facility])    COPD (chronic obstructive pulmonary disease) ([facility])    DDD (degenerative disc disease), lumbar    DM2 (diabetes mellitus, type 2) ([facility])    Dyslipidemia    GERD (gastroesophageal reflux disease)    H/O sick sinus syndrome    History of CVA (cerebrovascular accident)    Hypertension, essential    Obesity (BMI 30-39.9)    USMAN on CPAP    Osteoarthritis of carpometacarpal (CMC) joint of right thumb    Balance problem    Chronic vertigo    Elevated PSA    History of kidney stones    Delirium    Altered mental status    Moderate late onset Alzheimer's dementia with agitation ([facility])        Reviewed Nursing Notes for Previous 24 hours of Inpatient Charting:       Pain Scale:   Denied pain with no pain charted in last 24 hours       Graphics:   BP (!) 118/50   Pulse 52   Temp 97.5 °F (36.4 °C) (Oral)   Resp 14   SpO2 94%     Assessment (Head to Toe):   RESPIRATORY:  clear upper lobes, diminished lower  CARDIOVASCULAR:  regular rate and rhythm   GASTROINTESTINAL: abdomen soft, non-tender, bowel sounds active x 4   GENITOURINARY:  urinating okay, pad saturated, able to let staff know needed urinal.   SKIN:  Dry and warm   NEUROLOGICAL:  oriented x 3, tired during visit    Current Facility-Administered Medications: albuterol sulfate HFA (PROVENTIL;VENTOLIN;PROAIR) 108 (90 Base) MCG/ACT inhaler 2 puff, 2 puff, Inhalation, Q4H PRN  amLODIPine (NORVASC) tablet 5 mg, 5 mg, Oral, Daily  apixaban (ELIQUIS) tablet 5 mg, 5 mg, Oral, BID  atorvastatin (LIPITOR) tablet 80 mg, 80 mg, Oral, Daily  mometasone-formoterol (DULERA) 200-5 MCG/ACT inhaler 2 puff, 2 puff, Inhalation, BID RT  vitamin B-12 (CYANOCOBALAMIN) tablet 250 mcg, 250 mcg, Oral, Daily  donepezil (ARICEPT) tablet 5 mg, 5 mg, Oral, Nightly  ferrous sulfate (IRON 325) tablet 325 mg, 325 mg, Oral, Daily  insulin glargine (LANTUS) injection vial 20 Units, 20 Units, SubCUTAneous, BID  lisinopril (PRINIVIL;ZESTRIL) tablet 20 mg, 20 mg, Oral, Daily  hydroCHLOROthiazide (HYDRODIURIL) tablet 25 mg, 25 mg, Oral, Daily  meclizine (ANTIVERT) tablet 12.5 mg, 12.5 mg, Oral, TID PRN  metFORMIN (GLUCOPHAGE) tablet 500 mg, 500 mg, Oral, Daily with breakfast  OLANZapine (ZYPREXA) tablet 10 mg, 10 mg, Oral, Nightly  metoprolol tartrate (LOPRESSOR) tablet 12.5 mg, 12.5 mg, Oral, BID  pantoprazole (PROTONIX) tablet 40 mg, 40 mg, Oral, QAM AC  Vitamin D (CHOLECALCIFEROL) tablet 2,000 Units, 2,000 Units, Oral, Daily  insulin lispro (HUMALOG) injection vial 0-4 Units, 0-4 Units, SubCUTAneous, TID WC  insulin lispro (HUMALOG) injection vial 0-4 Units, 0-4 Units, SubCUTAneous, Nightly  glucose chewable tablet 16 g, 4 tablet, Oral, PRN  dextrose bolus 10% 125 mL, 125 mL, IntraVENous, PRN **OR** dextrose bolus 10% 250

## 2024-04-23 NOTE — PLAN OF CARE
Problem: Respiratory - Adult  Goal: Achieves optimal ventilation and oxygenation  Outcome: Progressing   Continue inhalers to improve breath sounds, pt agrees with plan of care

## 2024-04-23 NOTE — PROGRESS NOTES
Neurological:      Mental Status: He is alert.   Psychiatric:         Mood and Affect: Affect normal.         Speech: Speech normal. He is communicative.         Behavior: Behavior is not agitated, aggressive or hyperactive. Behavior is cooperative.          Palliative Performance Scale   30%  Bed Bound; Extensive disease; Total care; intake reduced; LOC full/confusion    Assessment and Plan   El Leo  is a 78 y.o. male with a hospice terminal diagnosis of fall resulting in spinal fracutre who is GIP day number four. The patient is admitted to Hospice General Inpatient level of care for acute management of  acute spinal fracture .  He is doing relatively well today.  He has required very few breakthrough doses of comfort medications.  We will begin ambulating him and having him work with therapy.  We will continue with his current comfort medications at this time.  Hospice social worker is assisting with finding placement.  He will either continue with hospice or transition back to skilled level of care depending on most advantageous situation for the patient and his family.      Principal Problem:    T12 compression fracture, initial encounter ([facility])  Active Problems:    Asbestosis ([facility])    Atrial fibrillation ([facility])    COPD (chronic obstructive pulmonary disease) ([facility])    DM2 (diabetes mellitus, type 2) ([facility])    GERD (gastroesophageal reflux disease)    H/O sick sinus syndrome    History of CVA (cerebrovascular accident)    Hypertension, essential    USMAN on CPAP    Altered mental status    Alzheimer's type dementia ([facility])    Physical deconditioning    Frequent falls    T12 compression fracture ([facility])    T11 vertebral fracture ([facility])    Hospice care patient  Resolved Problems:    * No resolved hospital problems. *     TLSO brace ordered today  Continue Morphine IR 5 mg and Morphine IV 2 mg every 1 hour as needed for breakthrough pain, shortness of breath, and CPOT greater than 0 and RDOS greater than

## 2024-04-24 VITALS
SYSTOLIC BLOOD PRESSURE: 137 MMHG | TEMPERATURE: 97.7 F | HEART RATE: 79 BPM | RESPIRATION RATE: 18 BRPM | DIASTOLIC BLOOD PRESSURE: 86 MMHG | OXYGEN SATURATION: 93 %

## 2024-04-24 DIAGNOSIS — R53.81 PHYSICAL DECONDITIONING: Primary | ICD-10-CM

## 2024-04-24 DIAGNOSIS — I48.0 PAROXYSMAL ATRIAL FIBRILLATION (HCC): ICD-10-CM

## 2024-04-24 DIAGNOSIS — Z86.73 HISTORY OF CVA (CEREBROVASCULAR ACCIDENT): Chronic | ICD-10-CM

## 2024-04-24 DIAGNOSIS — S22.080A T12 COMPRESSION FRACTURE, INITIAL ENCOUNTER (HCC): Primary | ICD-10-CM

## 2024-04-24 LAB
EKG ATRIAL RATE: 52 BPM
EKG ATRIAL RATE: 64 BPM
EKG P AXIS: 13 DEGREES
EKG P-R INTERVAL: 314 MS
EKG Q-T INTERVAL: 456 MS
EKG Q-T INTERVAL: 476 MS
EKG QRS DURATION: 140 MS
EKG QRS DURATION: 142 MS
EKG QTC CALCULATION (BAZETT): 442 MS
EKG QTC CALCULATION (BAZETT): 466 MS
EKG R AXIS: -76 DEGREES
EKG R AXIS: -78 DEGREES
EKG T AXIS: 17 DEGREES
EKG T AXIS: 45 DEGREES
EKG VENTRICULAR RATE: 52 BPM
EKG VENTRICULAR RATE: 63 BPM
GLUCOSE BLD STRIP.AUTO-MCNC: 161 MG/DL (ref 70–108)
GLUCOSE BLD STRIP.AUTO-MCNC: 90 MG/DL (ref 70–108)

## 2024-04-24 PROCEDURE — 6370000000 HC RX 637 (ALT 250 FOR IP): Performed by: STUDENT IN AN ORGANIZED HEALTH CARE EDUCATION/TRAINING PROGRAM

## 2024-04-24 PROCEDURE — 99238 HOSP IP/OBS DSCHRG MGMT 30/<: CPT | Performed by: STUDENT IN AN ORGANIZED HEALTH CARE EDUCATION/TRAINING PROGRAM

## 2024-04-24 PROCEDURE — 82948 REAGENT STRIP/BLOOD GLUCOSE: CPT

## 2024-04-24 PROCEDURE — 97530 THERAPEUTIC ACTIVITIES: CPT

## 2024-04-24 PROCEDURE — 97535 SELF CARE MNGMENT TRAINING: CPT

## 2024-04-24 PROCEDURE — 2580000003 HC RX 258: Performed by: STUDENT IN AN ORGANIZED HEALTH CARE EDUCATION/TRAINING PROGRAM

## 2024-04-24 PROCEDURE — 94640 AIRWAY INHALATION TREATMENT: CPT

## 2024-04-24 RX ORDER — MORPHINE SULFATE 20 MG/ML
5 SOLUTION ORAL EVERY 6 HOURS PRN
Qty: 14 ML | Refills: 0 | Status: SHIPPED | OUTPATIENT
Start: 2024-04-24 | End: 2024-04-24

## 2024-04-24 RX ORDER — WHEELCHAIR
EACH MISCELLANEOUS
Qty: 1 EACH | Refills: 0 | Status: SHIPPED | OUTPATIENT
Start: 2024-04-24

## 2024-04-24 RX ORDER — SENNOSIDES A AND B 8.6 MG/1
1 TABLET, FILM COATED ORAL NIGHTLY PRN
DISCHARGE
Start: 2024-04-24 | End: 2024-05-24

## 2024-04-24 RX ORDER — POLYETHYLENE GLYCOL 3350 17 G/17G
17 POWDER, FOR SOLUTION ORAL DAILY PRN
Qty: 527 G | Refills: 1 | DISCHARGE
Start: 2024-04-24 | End: 2024-06-25

## 2024-04-24 RX ORDER — HYDROCODONE BITARTRATE AND ACETAMINOPHEN 5; 325 MG/1; MG/1
1 TABLET ORAL EVERY 8 HOURS PRN
Qty: 21 TABLET | Refills: 0 | Status: SHIPPED | OUTPATIENT
Start: 2024-04-24 | End: 2024-05-01

## 2024-04-24 RX ADMIN — FERROUS SULFATE TAB 325 MG (65 MG ELEMENTAL FE) 325 MG: 325 (65 FE) TAB at 08:39

## 2024-04-24 RX ADMIN — HYDROCHLOROTHIAZIDE 25 MG: 25 TABLET ORAL at 08:39

## 2024-04-24 RX ADMIN — METOPROLOL TARTRATE 12.5 MG: 25 TABLET, FILM COATED ORAL at 08:39

## 2024-04-24 RX ADMIN — ATORVASTATIN CALCIUM 80 MG: 80 TABLET, FILM COATED ORAL at 08:39

## 2024-04-24 RX ADMIN — INSULIN GLARGINE 20 UNITS: 100 INJECTION, SOLUTION SUBCUTANEOUS at 08:44

## 2024-04-24 RX ADMIN — METFORMIN HYDROCHLORIDE 500 MG: 500 TABLET ORAL at 08:39

## 2024-04-24 RX ADMIN — LISINOPRIL 20 MG: 20 TABLET ORAL at 08:39

## 2024-04-24 RX ADMIN — Medication 250 MCG: at 08:39

## 2024-04-24 RX ADMIN — SODIUM CHLORIDE, PRESERVATIVE FREE 10 ML: 5 INJECTION INTRAVENOUS at 08:40

## 2024-04-24 RX ADMIN — Medication 2000 UNITS: at 08:39

## 2024-04-24 RX ADMIN — PANTOPRAZOLE SODIUM 40 MG: 40 TABLET, DELAYED RELEASE ORAL at 05:11

## 2024-04-24 RX ADMIN — Medication 250 MG: at 08:39

## 2024-04-24 RX ADMIN — AMLODIPINE BESYLATE 5 MG: 5 TABLET ORAL at 08:39

## 2024-04-24 RX ADMIN — MOMETASONE FUROATE AND FORMOTEROL FUMARATE DIHYDRATE 2 PUFF: 200; 5 AEROSOL RESPIRATORY (INHALATION) at 07:36

## 2024-04-24 RX ADMIN — ALBUTEROL SULFATE 2 PUFF: 90 AEROSOL, METERED RESPIRATORY (INHALATION) at 07:36

## 2024-04-24 RX ADMIN — APIXABAN 5 MG: 5 TABLET, FILM COATED ORAL at 08:39

## 2024-04-24 ASSESSMENT — PAIN SCALES - GENERAL: PAINLEVEL_OUTOF10: 0

## 2024-04-24 NOTE — PLAN OF CARE
Problem: Safety - Adult  Goal: Free from fall injury  Outcome: Progressing  Fall assessment completed. Personal items within reach. Patient is also compliant with use of non-skid slippers. Bed alarm on. Bed on lowest position. Video safety monitor at the bedside.       Problem: Skin/Tissue Integrity  Goal: Absence of new skin breakdown  Description: 1.  Monitor for areas of redness and/or skin breakdown  2.  Assess vascular access sites hourly  3.  Every 4-6 hours minimum:  Change oxygen saturation probe site  4.  Every 4-6 hours:  If on nasal continuous positive airway pressure, respiratory therapy assess nares and determine need for appliance change or resting period.  Outcome: Progressing  No skin breakdown this shift. Patient able to turn self on bed. Patient states understanding of repositioning every two hours.      Problem: Pain  Goal: Verbalizes/displays adequate comfort level or baseline comfort level  Outcome: Progressing  Flowsheets (Taken 4/23/2024 2056)  Verbalizes/displays adequate comfort level or baseline comfort level:   Encourage patient to monitor pain and request assistance   Assess pain using appropriate pain scale   Implement non-pharmacological measures as appropriate and evaluate response  Pain Assessment: 0-10  Pain Level: 4 (Patient refuses to take any pain medication at this time.)   Patient's Stated Pain Goal: Unable to verbalize/indicate pain goal   Is pain goal met at this time?  Yes     Non-Pharmaceutical Pain Intervention(s): Rest, Distraction      Problem: Discharge Planning  Goal: Discharge to home or other facility with appropriate resources  Outcome: Progressing  Flowsheets (Taken 4/23/2024 2056)  Discharge to home or other facility with appropriate resources:   Identify barriers to discharge with patient and caregiver   Arrange for needed discharge resources and transportation as appropriate   Identify discharge learning needs (meds, wound care, etc)  From [facility].  Will be undergoing a left total knee replacement at [facility]  Had a fall and injury and had increasing knee pain since that time affecting his ADLs and quality of life he saw the Cardiology was cleared by Cardiology already the day of surgery we told him to take the following medications  Aspirin   Lexapro   Nexium  Metoprolol succinate 100 mg   Imdur 30 mg     Do not take any diabetic medication the day of surgery    No insulin no metformin Medicaid pending. Plan to discharge to facility.    Patient on a  DNR-CC status. Patient verbalizes understanding of the care plan. Comfort measures provided to the patient.

## 2024-04-24 NOTE — PROGRESS NOTES
hospice nurse visit made to assess patient and symptoms, provide support, and assist with care as needed. No need for comfort medications last 24 hrs. Noted that he had 2 small BMs early this morning. Plan discharge this date to [facility] for skilled therapies.   Candido sitting up recliner with no s/s of discomfort or distress. He denied pain. Reports he is doing okay but told nurse that he is upset, pointed to walker and said \" I don't use that kind of wheelchair\". Told him that this is a walker and asked what type he uses. He then told nurse \"I don't use one\". Patient with scowl on face, asked him okay to listen to lungs, heart, bowel sounds, and take look at him. Stated \"I don't care\". Assessment completed. Let him know that daughter will be picking him up at 1500 to transport to facility for him to do therapies to improve strength. No reaction to this. Asked if nurse could get him anything, shook head and said no. Call light in reach.   Primary nurse Gardenia RN denied needs for assistance with care.   DNR-CC signed by Dr. Colorado, copy put in facility packet and copy made for daughter for transport to facility as well as copy for her.   Janet was updated that paper needing signed, revocation form from hospice. Primary nurse Gardenia RN aware that nurse has paper for her to sign as well.     GIP Admit Date: 04.19.2024     Terminal Diagnosis: T12 compression fracture, initial encounter      Secondary Diagnosis:      Asbestosis ([facility])      ASHD (arteriosclerotic heart disease)    Atrial fibrillation ([facility])    COPD (chronic obstructive pulmonary disease) ([facility])    DDD (degenerative disc disease), lumbar    DM2 (diabetes mellitus, type 2) ([facility])    Dyslipidemia    GERD (gastroesophageal reflux disease)    H/O sick sinus syndrome    History of CVA (cerebrovascular accident)    Hypertension, essential    Obesity (BMI 30-39.9)    USAMN on CPAP    Osteoarthritis of carpometacarpal (CMC) joint of right thumb    Balance problem

## 2024-04-24 NOTE — DISCHARGE INSTR - COC
Continuity of Care Form    Patient Name: El Leo   :  1945  MRN:  [id number]    Admit date:  2024  Discharge date:  24    Code Status Order: DNR-CC   Advance Directives:     Admitting Physician:  Angelo Colorado MD  PCP: Main Caldwell DO    Discharging Nurse: Gardenia Davis RN  Discharging Hospital Unit/Room#: 5K-14/014-A  Discharging Unit Phone Number: [phone]    Emergency Contact:   Extended Emergency Contact Information  Primary Emergency Contact: zahida thapa  Address: [facility] [address]  Home Phone: [phone]  Mobile Phone: [phone]  Relation: Child   needed? No  Secondary Emergency Contact: Simin Leo  Address: [address]  Home Phone: [phone]  Mobile Phone: [phone]  Relation: Spouse    Past Surgical History:  Past Surgical History:   Procedure Laterality Date    CALCANEAL OSTEOTOMY Bilateral     CARPAL TUNNEL RELEASE Right     CATARACT REMOVAL      CHOLECYSTECTOMY      ERCP  2019    With stone removal    LITHOTRIPSY      LUMBAR SPINE SURGERY      L2-L3 lumbar laminectomy    TONSILLECTOMY      VENTRAL HERNIA REPAIR      WRIST SURGERY         Immunization History:   Immunization History   Administered Date(s) Administered    Influenza Virus Vaccine 2018, 2018, 2020, 2020, 11/10/2021    Pneumococcal, PCV-13, PREVNAR 13, (age 6w+), IM, 0.5mL 03/10/2016    Pneumococcal, PPSV23, PNEUMOVAX 23, (age 2y+), SC/IM, 0.5mL 2014    TDaP, ADACEL (age 10y-64y), BOOSTRIX (age 10y+), IM, 0.5mL 2014, 2023       Active Problems:  Patient Active Problem List   Diagnosis Code    Asbestosis (HCC) J61    ASHD (arteriosclerotic heart disease) I25.10    Atrial fibrillation (H[facility]) I48.91    COPD (chronic obstructive pulmonary disease) (HCC) J44.9    DDD (degenerative disc disease), lumbar M51.36    DM2 (diabetes

## 2024-04-24 NOTE — DISCHARGE SUMMARY
[facility] Discharge Summary        Patient:   El Leo  YOB: 1945  Age:  78 y.o.  Room:  [address] [place] [address]  MRN:  [id number]   Acct: [id number]  PCP: Main Caldwell DO    Admit Date: 4/19/2024     Discharge Date:   4/24/2024    Admitting Physician: Angelo Colorado MD     Discharge Physician: Angelo Colorado MD     Discharge Diagnoses:    Active Hospital Problems    Diagnosis Date Noted    Hospice care patient [Z51.5] 04/20/2024    T12 compression fracture ([facility]) [S22.080A] 04/19/2024    T11 vertebral fracture ([facility]) [S22.089A] 04/19/2024    T12 compression fracture, initial encounter ([facility]) [S22.080A] 04/19/2024    Frequent falls [R29.6] 04/18/2024    Alzheimer's type dementia ([facility]) [G30.9, F02.80] 04/18/2024    Physical deconditioning [R53.81] 04/18/2024    Altered mental status [R41.82] 04/11/2024    Atrial fibrillation ([facility]) [I48.91] 04/03/2024    COPD (chronic obstructive pulmonary disease) ([facility]) [J44.9] 04/03/2024    DM2 (diabetes mellitus, type 2) ([facility]) [E11.9] 04/03/2024    GERD (gastroesophageal reflux disease) [K21.9] 04/03/2024    H/O sick sinus syndrome [Z86.79] 04/03/2024    History of CVA (cerebrovascular accident) [Z86.73] 04/03/2024    Hypertension, essential [I10] 04/03/2024    USMAN on CPAP [G47.33] 04/03/2024    Asbestosis ([facility]) [J61] 04/03/2024       The patient was seen and examined on day of discharge and this discharge summary is in conjunction with any daily progress note from day of discharge.     Plan   TLSO brace, can follow up with ortho spine if desired by family  Continue Morphine IR 5 mg every 6 hour as needed for breakthrough pain, shortness of breath  Continue MiraLAX and senna daily as needed for constipation  Continue home Norvasc 5 mg daily, Eliquis 5 mg twice a day, Lipitor 80 mg daily, Aricept 5 mg nightly, iron 65 mg daily, hydrochlorothiazide 25 mg daily, lisinopril 20 mg daily, metformin 500 mg daily with breakfast and 250 mg

## 2024-04-24 NOTE — PROGRESS NOTES
[facility]  STRZ ONC MED 5K  Occupational Therapy  Daily Note  Time:   Time In: 940  Time Out: 1033  Timed Code Treatment Minutes: 53 Minutes  Minutes: 53          Date: 2024  Patient Name: El Leo,   Gender: male      Room: [facility]014-A  MRN: [id number]  : 1945  (78 y.o.)  Referring Practitioner: Angelo Colorado MD  Diagnosis: T12 compression fracture  Additional Pertinent Hx: per chart review; Patient presented from [facility] after just being discharged yesterday where he was admitted for altered mental status with hallucinations, dementia, confusion with a history of memory loss and dementia with behavioral disturbance was found down at the nursing home do not know why he was on the floor or how he got on the floor or how long he been on the floor but no external evidence of trauma was noted no new bruising no marks on his head no skin tears nothing.  Brought in a level 2 was called.  We evaluated in the emergency room where a FAST exam was negative full-body exam revealed no acute injury findings old bruise over his left hip area some old scars over his left hip and an old spine fixation incision.  He was in a collar but unresponsive with a GCS of 9.    Restrictions/Precautions:  Restrictions/Precautions: General Precautions, Fall Risk  Required Braces or Orthoses  Spinal: Thoracic Lumbar Sacral Orthotics  Position Activity Restriction  Spinal Precautions: No Bending, No Twisting, No Lifting     Social/Functional History:  Lives With: Spouse  Type of Home: Apartment  Home Layout: One level  Home Access: Level entry  Home Equipment: Rollator (3 wheeled rollatro)   Bathroom Shower/Tub: Tub/Shower unit  Bathroom Toilet: Standard  Bathroom Accessibility: Accessible       ADL Assistance: Independent  Homemaking Responsibilities: No  Ambulation Assistance: Independent  Transfer Assistance: Independent    Active : No  Patient's  Info: DaughterJanet  Occupation:  Retired  Additional Comments: patient's spouse is a retired nurse and is unable to physically assist patient.    SUBJECTIVE: RN ok'ed session. Pt lying supine with HOB elevated upon arrival. Pt pleasant and agreeable to OT session. Pt educated on discharge recommendation this date. Pt states him and his wife are moving into an apartment together. Pt also states he doesn't know where his wife is at and the last time he saw her was at his son's house and she met there a week ago. Patient educated on safety concerns with d/c home; this writer recommends SNF and educated patient he requires 24 hr A. Patient followed up to 50% of this writer's commands this date.    PAIN: no c/o    Vitals: Vitals not assessed per clinical judgement, see nursing flowsheet    COGNITION: Slow Processing, Decreased Recall, Decreased Insight, Decreased Problem Solving, Decreased Safety Awareness, Difficulty Following Commands, and Impulsive    ADL:   Bathing: Minimal Assistance.  Pt requires min A to clean bottom this date d/t precautions. Pt completes stand to clean corina area and requires CGA for standing balance. Pt educated to wash B feet in figure 4 position in order to maintain back precautions  Upper Extremity Dressing: Maximum Assistance and with increased time for completion.  Pt able to doff/ don gown with min A to tie gown ties when donning gown. Pt requires max A to don brace. Pt educated on orientation of brace and given technique to don however pt still requiring this writer to do majority of task.   Lower Extremity Dressing: Dependent.  To doff/don depends with tabs  Footwear Management: Supervision.  Seated on EOB to doff/wayne B slipper socks. Pt educated to complete in figure 4 position to maintain back precautions .    IADL:   Not Tested    BALANCE:  Sitting Balance:  Supervision.    Standing Balance: Contact Guard Assistance, Minimal Assistance. Pt demo'ed L later lean intermittently     BED MOBILITY:  Rolling to Right:

## 2024-04-24 NOTE — PROGRESS NOTES
Report called to Nurse Newell at [facility]. Last dose MAR, AVS and prescription faxed to [phone]. Daughter here to transport patient to facility.

## 2024-04-24 NOTE — PROGRESS NOTES
04/24/24 1105   Encounter Summary   Encounter Overview/Reason  Grief, Loss, and Adjustments   Service Provided For: Patient   Referral/Consult From: Hospice   Support System Family members   Last Encounter  04/24/24   Complexity of Encounter Low   Begin Time 1048   End Time  1055   Total Time Calculated 7 min   Grief, Loss, and Adjustments   Type Hospice;End of Life   Assessment/Intervention/Outcome   Assessment Unable to assess  (sleeping)   Intervention Sustaining Presence/Ministry of presence;Prayer (assurance of)/[place]   Outcome Did not respond     Hospice chaplain made a follow up visit to assess spiritual needs. Patient sat in his chair asleep. Prayers were shared and spiritual health remains available.

## 2024-04-24 NOTE — PROGRESS NOTES
Patient offered to take prn Glycolax and senna tablet. This RN explained benefit of the medication. Patient acknowledged but refused to take the medications at this time, saying \"I don't want any stool softeners tonight. I don't think I need it.\" This RN instructed patient to inform the nurse if he would want to take the medications anytime during the shift. Patient acknowledged.

## 2024-04-25 ENCOUNTER — APPOINTMENT (OUTPATIENT)
Dept: CT IMAGING | Age: 79
End: 2024-04-25
Payer: MEDICARE

## 2024-04-25 ENCOUNTER — APPOINTMENT (OUTPATIENT)
Dept: GENERAL RADIOLOGY | Age: 79
End: 2024-04-25
Payer: MEDICARE

## 2024-04-25 ENCOUNTER — HOSPITAL ENCOUNTER (EMERGENCY)
Age: 79
Discharge: HOME | End: 2024-04-25
Attending: STUDENT IN AN ORGANIZED HEALTH CARE EDUCATION/TRAINING PROGRAM
Payer: MEDICARE

## 2024-04-25 VITALS
HEART RATE: 67 BPM | DIASTOLIC BLOOD PRESSURE: 69 MMHG | HEIGHT: 71 IN | WEIGHT: 225 LBS | BODY MASS INDEX: 31.5 KG/M2 | TEMPERATURE: 98.5 F | OXYGEN SATURATION: 94 % | SYSTOLIC BLOOD PRESSURE: 115 MMHG | RESPIRATION RATE: 12 BRPM
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
## 2024-04-25 DIAGNOSIS — S51.012A ELBOW LACERATION, LEFT, INITIAL ENCOUNTER: ICD-10-CM

## 2024-04-25 PROBLEM — R41.82 ALTERED MENTAL STATUS: Status: RESOLVED | Noted: 2024-04-11 | Resolved: 2024-04-25

## 2024-04-25 PROBLEM — S22.080A T12 COMPRESSION FRACTURE, INITIAL ENCOUNTER (HCC): Status: RESOLVED | Noted: 2024-04-19 | Resolved: 2024-04-25

## 2024-04-25 PROBLEM — S22.080A T12 COMPRESSION FRACTURE (HCC): Status: ACTIVE | Noted: 2024-04-25

## 2024-04-25 PROBLEM — Z51.5 HOSPICE CARE PATIENT: Status: RESOLVED | Noted: 2024-04-20 | Resolved: 2024-04-25

## 2024-04-25 PROBLEM — S22.089A T11 VERTEBRAL FRACTURE (HCC): Status: RESOLVED | Noted: 2024-04-19 | Resolved: 2024-04-25

## 2024-04-25 PROCEDURE — 12001 RPR S/N/AX/GEN/TRNK 2.5CM/<: CPT

## 2024-04-25 PROCEDURE — 72125 CT NECK SPINE W/O DYE: CPT

## 2024-04-25 PROCEDURE — 72170 X-RAY EXAM OF PELVIS: CPT

## 2024-04-25 PROCEDURE — 73080 X-RAY EXAM OF ELBOW: CPT

## 2024-04-25 PROCEDURE — 70450 CT HEAD/BRAIN W/O DYE: CPT

## 2024-04-25 PROCEDURE — 71045 X-RAY EXAM CHEST 1 VIEW: CPT

## 2024-04-25 PROCEDURE — 2500000003 HC RX 250 WO HCPCS: Performed by: STUDENT IN AN ORGANIZED HEALTH CARE EDUCATION/TRAINING PROGRAM

## 2024-04-25 PROCEDURE — 99284 EMERGENCY DEPT VISIT MOD MDM: CPT

## 2024-04-25 RX ORDER — LIDOCAINE HYDROCHLORIDE 10 MG/ML
5 INJECTION, SOLUTION EPIDURAL; INFILTRATION; INTRACAUDAL; PERINEURAL ONCE
Status: COMPLETED | OUTPATIENT
Start: 2024-04-25 | End: 2024-04-25

## 2024-04-25 RX ADMIN — LIDOCAINE HYDROCHLORIDE 4 ML: 10 INJECTION, SOLUTION EPIDURAL; INFILTRATION; INTRACAUDAL; PERINEURAL at 02:36

## 2024-04-25 ASSESSMENT — PAIN - FUNCTIONAL ASSESSMENT
PAIN_FUNCTIONAL_ASSESSMENT: NONE - DENIES PAIN

## 2024-04-25 NOTE — ED TRIAGE NOTES
Pt presents to the ED via EMS from [facility] with c/o elbow laceration, EMS states they were told about a 1cm laceration on left elbow, laceration has bandage in place that nursing home put on, EMS states bleeding was controlled. Pt around 0030 got up out of bed to use restroom and pt states he took about a step and a half and fell, pt hit the back of his head and elbow on hardwood floor. Pt states he did not feel weak or off-balance when he fell. Pt states he is on Elliquis. Pt states he has fallen a lot and always falls the same way. Pt states he uses a walker to ambulate. Pt VSS and A&O x4. Pt is not complaining of pain.

## 2024-04-25 NOTE — TELEPHONE ENCOUNTER
Pt admitted from [facility] to [facility]  Was on hospice, but that was revoked prior to discharge back to us.   Admits to us on Morphine 20mg/ml yet.    I feel this medication is way too high risk for non-hospice pt.     By all accounts, is not having a sig amnt of pain    Will d/c morphine    Start tylenol 325mg, 2 tab po q6h prn  Norco 5/325mg po q8h prn pain    ASSESSMENT & PLAN   Diagnosis Orders   1. T12 compression fracture, initial encounter ([facility])  HYDROcodone-acetaminophen (NORCO) 5-325 MG per tablet        Medications Discontinued During This Encounter   Medication Reason    Morphine Sulfate (MORPHINE 20MG/ML) SOLN concentrated solution        OAARS reviewed and appropriate.     Controlled Substances Monitoring: Periodic Controlled Substance Monitoring: Possible medication side effects, risk of tolerance/dependence & alternative treatments discussed., No signs of potential drug abuse or diversion identified. (Main [facility]dwell DO)      Future Appointments   Date Time Provider Department Center   5/13/2024  2:30 PM Rylan Catherine MD [facility] [place]   5/16/2024  3:40 PM Main Caldwell DO Fam Med [facility] [place]   5/29/2024  2:00 PM Angelo Colorado MD N [facility] [place]

## 2024-04-25 NOTE — ED PROVIDER NOTES
[facility] EMERGENCY DEPT  EMERGENCY DEPARTMENT ENCOUNTER          Pt Name: El Leo  MRN: [id number]  Birthdate 1945  Date of evaluation: 4/25/2024  Physician: Familia Bellamy DO      CHIEF COMPLAINT     No chief complaint on file.        HISTORY OF PRESENT ILLNESS    HPI  El Leo is a 78 y.o. male who presents to the emergency department from nursing home, brought in by EMS for evaluation of left elbow laceration and fall.  Patient was trying to ambulate when he fell and hit his butt, the back of his head, and left elbow.  Patient was noted to have a laceration of his left elbow.  Bleeding was controlled with direct pressure and bandaging.  Patient reports no significant acute complaints at this time.  The patient has no other acute complaints at this time.      REVIEW OF SYSTEMS   Review of Systems   All other systems reviewed and are negative.        PAST MEDICAL AND SURGICAL HISTORY     Past Medical History:   Diagnosis Date    Alzheimer's type dementia (HCC) 04/18/2024    Asbestosis ([facility])     ASHD (arteriosclerotic heart disease)     Atrial fibrillation ([facility])     COPD (chronic obstructive pulmonary disease) ([facility])     DDD (degenerative disc disease), lumbar     s/p lumbar fusion with geetha placement    DM2 (diabetes mellitus, type 2) ([facility])     Dyslipidemia     GERD (gastroesophageal reflux disease)     H/O sick sinus syndrome     History of CVA (cerebrovascular accident)     History of small, chronic, cerebral infarcts, involving left frontal and parietal lobe cortices and bilateral cerebellar hemispheres, per MRI of the brain on 04/07/2020    History of kidney stones 04/03/2024    Hypertension, essential     Obesity (BMI 30-39.9)     USMAN on CPAP     Osteoarthritis of carpometacarpal (CMC) joint of right thumb     Chronic thumb pain, right, due to arthritis, causing sub optimal use of the thumb     Past Surgical History:   Procedure Laterality Date    CALCANEAL OSTEOTOMY  delayed release capsule, Take 1 capsule by mouth daily, Disp: , Rfl:     vitamin D (VITAMIN D3) 50 MCG (2000 UT) CAPS capsule, Take 1 capsule by mouth daily, Disp: , Rfl:     budesonide-formoterol (SYMBICORT) 160-4.5 MCG/ACT AERO, Inhale 2 puffs into the lungs 2 times daily, Disp: , Rfl:     insulin lispro, 1 Unit Dial, (HUMALOG KWIKPEN) 100 UNIT/ML SOPN, Using sliding scale with breakfast and dinner., Disp: , Rfl:     atorvastatin (LIPITOR) 80 MG tablet, Take 1 tablet by mouth daily, Disp: , Rfl:     lisinopril-hydroCHLOROthiazide (ZESTORETIC) 20-25 MG per tablet, Take 1 tablet by mouth daily, Disp: , Rfl:     metFORMIN (GLUCOPHAGE) 500 MG tablet, TAKE 1 TABLET DAILY WITH BREAKFAST AND 1/2 TABLET DAILY WITH SUPPER, AS PREFERRED TO PREVENT DIARRHEA., Disp: , Rfl:     meclizine (ANTIVERT) 12.5 MG tablet, Take 1 tablet by mouth 3 times daily as needed for Dizziness, Disp: , Rfl:     albuterol sulfate HFA (PROVENTIL;VENTOLIN;PROAIR) 108 (90 Base) MCG/ACT inhaler, Take 2 puffs by mouth every 4 hours as needed for Wheezing or Shortness of Breath, Disp: , Rfl:     cyanocobalamin 250 MCG tablet, Take 1 tablet by mouth daily, Disp: , Rfl:     docusate sodium (COLACE) 50 MG capsule, Take 1 capsule by mouth as needed, Disp: , Rfl:     Melatonin 5 MG CAPS, Take by mouth, Disp: , Rfl:     magnesium (MAGNESIUM-OXIDE) 250 MG TABS tablet, Take 1 tablet by mouth daily, Disp: , Rfl:     FERROUS SULFATE ER PO, Take 65 mg by mouth daily, Disp: , Rfl:     Previous Medications    ALBUTEROL SULFATE HFA (PROVENTIL;VENTOLIN;PROAIR) 108 (90 BASE) MCG/ACT INHALER    Take 2 puffs by mouth every 4 hours as needed for Wheezing or Shortness of Breath    AMLODIPINE (NORVASC) 5 MG TABLET    Take 1 tablet by mouth daily    APIXABAN (ELIQUIS) 5 MG TABS TABLET    Take 1 tablet by mouth 2 times daily    ATORVASTATIN (LIPITOR) 80 MG TABLET    Take 1 tablet by mouth daily    BUDESONIDE-FORMOTEROL (SYMBICORT) 160-4.5 MCG/ACT AERO    Inhale 2 puffs into

## 2024-04-25 NOTE — ED NOTES
This RN spoke with RIYA Parnell at [facility] and updated her on this pt.

## 2024-04-25 NOTE — ED NOTES
Pt is resting in bed with no requests at this time. Pt is breathing regular and unlabored on room air. Pt has no signs of distress to note at this time. This RN went over pt's discharge paperwork, just waiting for LACP. Call light within reach.

## 2024-05-14 ENCOUNTER — TELEPHONE (OUTPATIENT)
Dept: FAMILY MEDICINE CLINIC | Age: 79
End: 2024-05-14

## 2024-05-14 NOTE — TELEPHONE ENCOUNTER
Pt is a current resident at [facility] and is scheduled for an appointment on Thursday. Pt's daughter, Janet (on HIPAA) called and is asking if the appointment is needed.    Please advise

## 2024-08-15 PROBLEM — D64.9 NORMOCYTIC ANEMIA: Status: ACTIVE | Noted: 2024-08-15

## 2024-11-21 PROBLEM — D64.9 NORMOCYTIC ANEMIA: Status: RESOLVED | Noted: 2024-08-15 | Resolved: 2024-11-21

## 2024-11-21 PROBLEM — R53.81 PHYSICAL DECONDITIONING: Status: RESOLVED | Noted: 2024-04-18 | Resolved: 2024-11-21

## 2024-11-21 PROBLEM — D63.8 ANEMIA OF CHRONIC DISEASE: Status: ACTIVE | Noted: 2024-11-21

## 2024-11-21 PROBLEM — D63.8 ANEMIA OF CHRONIC DISEASE: Chronic | Status: ACTIVE | Noted: 2024-11-21

## 2024-11-21 PROBLEM — R29.6 FREQUENT FALLS: Status: RESOLVED | Noted: 2024-04-18 | Resolved: 2024-11-21

## 2024-11-21 PROBLEM — Z51.5 PALLIATIVE CARE PATIENT: Status: RESOLVED | Noted: 2024-04-20 | Resolved: 2024-11-21

## 2024-11-21 PROBLEM — M54.50 CHRONIC MIDLINE LOW BACK PAIN WITHOUT SCIATICA: Status: ACTIVE | Noted: 2024-11-21

## 2024-11-21 PROBLEM — G89.29 CHRONIC MIDLINE LOW BACK PAIN WITHOUT SCIATICA: Status: ACTIVE | Noted: 2024-11-21

## 2025-02-04 LAB
A/G RATIO: 1.4 RATIO (ref 0.8–2.6)
ALBUMIN: 3.4 G/DL (ref 3.5–5.2)
ALP BLD-CCNC: 101 U/L (ref 23–144)
ALT SERPL-CCNC: 15 U/L (ref 0–60)
AST SERPL-CCNC: 24 U/L (ref 0–55)
BASOPHILS ABSOLUTE: 0.1 K/UL (ref 0–0.3)
BASOPHILS RELATIVE PERCENT: 1 % (ref 0–2)
BILIRUB SERPL-MCNC: 0.2 MG/DL (ref 0–1.2)
BUN / CREAT RATIO: 31 (ref 7–25)
BUN BLDV-MCNC: 37 MG/DL (ref 3–29)
CALCIUM SERPL-MCNC: 9.2 MG/DL (ref 8.5–10.5)
CHLORIDE BLD-SCNC: 103 MEQ/L (ref 96–110)
CHOLESTEROL, TOTAL: 81 MG/DL
CO2: 26 MEQ/L (ref 19–32)
CREAT SERPL-MCNC: 1.2 MG/DL (ref 0.5–1.2)
DIFFERENTIAL COUNT: ABNORMAL
EOSINOPHILS ABSOLUTE: 0.4 K/UL (ref 0–0.5)
EOSINOPHILS RELATIVE PERCENT: 4.2 % (ref 0–5)
ESTIMATED GLOMERULAR FILTRATION RATE CREATININE EQUATION: 61 MLS/MIN/1.73M2
FASTING STATUS: ABNORMAL
GLOBULIN: 2.5 G/DL (ref 1.9–3.6)
GLUCOSE BLD-MCNC: 79 MG/DL (ref 70–99)
HBA1C MFR BLD: 6.2 %
HCT VFR BLD CALC: 31 % (ref 37.5–51)
HDLC SERPL-MCNC: 35 MG/DL
HEMOGLOBIN: 10.3 G/DL (ref 13–17.7)
IMMATURE GRANS (ABS): 0 K/UL (ref 0–0.1)
IMMATURE GRANULOCYTES %: 0.2 %
LDL CHOLESTEROL: 34 MG/DL
LYMPHOCYTES ABSOLUTE: 3.4 K/UL (ref 0.9–4.1)
LYMPHOCYTES RELATIVE PERCENT: 37.7 % (ref 14–51)
MCH RBC QN AUTO: 30.2 PG (ref 26–34)
MCHC RBC AUTO-ENTMCNC: 33.2 G/DL (ref 30.7–35.5)
MCV RBC AUTO: 90.9 FL (ref 80–100)
MONOCYTES ABSOLUTE: 0.9 K/UL (ref 0.2–1)
MONOCYTES RELATIVE PERCENT: 10.1 % (ref 4–12)
NEUTROPHILS ABSOLUTE: 4.2 K/UL (ref 1.8–7.5)
NEUTROPHILS RELATIVE PERCENT: 46.8 % (ref 42–80)
PDW BLD-RTO: 13.2 %
PLATELET # BLD: 234 K/UL (ref 140–400)
PMV BLD AUTO: 10.2 FL (ref 7.2–11.7)
POTASSIUM SERPL-SCNC: 4.7 MEQ/L (ref 3.4–5.3)
RBC # BLD: 3.41 M/UL (ref 4.14–5.8)
RETICULOCYTE ABSOLUTE COUNT: 0 /100 WBC
SODIUM BLD-SCNC: 140 MEQ/L (ref 135–148)
T4 FREE: 1.32 NG/DL (ref 0.8–1.8)
TOTAL PROTEIN: 5.9 G/DL (ref 6–8.3)
TRIGL SERPL-MCNC: 60 MG/DL
TSH ULTRASENSITIVE: 1.73 MCIU/ML (ref 0.4–4.5)
VLDLC SERPL CALC-MCNC: 12 MG/DL (ref 4–38)
WBC # BLD: 9 K/UL (ref 3.5–10.9)

## 2025-02-12 LAB
FERRITIN: 178 NG/ML (ref 18–350)
FOLATE: 10.7 NG/ML
HCT VFR BLD CALC: 31.6 % (ref 37.5–51)
HEMOGLOBIN: 10.5 G/DL (ref 13–17.7)
IRON % SATURATION: 21 % (ref 12–57)
IRON: 46 MCG/DL (ref 40–190)
MCH RBC QN AUTO: 29.8 PG (ref 26–34)
MCHC RBC AUTO-ENTMCNC: 33.2 G/DL (ref 30.7–35.5)
MCV RBC AUTO: 89.8 FL (ref 80–100)
PDW BLD-RTO: 13.4 %
PLATELET # BLD: 243 K/UL (ref 140–400)
PMV BLD AUTO: 10.2 FL (ref 7.2–11.7)
RBC # BLD: 3.52 M/UL (ref 4.14–5.8)
TOTAL IRON BINDING CAPACITY: 218 MCG/DL (ref 200–450)
VITAMIN B-12: 779 PG/ML (ref 200–1100)
WBC # BLD: 8.6 K/UL (ref 3.5–10.9)

## 2025-03-12 LAB
HCT VFR BLD CALC: NORMAL % (ref 37.5–51)
HEMOGLOBIN: NORMAL G/DL (ref 13–17.7)
MCH RBC QN AUTO: NORMAL PG (ref 26–34)
MCHC RBC AUTO-ENTMCNC: NORMAL G/DL (ref 30.7–35.5)
MCV RBC AUTO: NORMAL FL (ref 80–100)
PDW BLD-RTO: NORMAL %
PLATELET # BLD: NORMAL K/UL (ref 140–400)
PLATELET COMMENT: NORMAL
PMV BLD AUTO: NORMAL FL (ref 7.2–11.7)
RBC # BLD: NORMAL M/UL (ref 4.14–5.8)
RBC COMMENT: NORMAL
WBC # BLD: NORMAL K/UL (ref 3.5–10.9)
WBC COMMENT: NORMAL

## 2025-03-13 LAB
HCT VFR BLD CALC: 30.6 % (ref 37.5–51)
HEMOGLOBIN: 9.9 G/DL (ref 13–17.7)
MCH RBC QN AUTO: 29.4 PG (ref 26–34)
MCHC RBC AUTO-ENTMCNC: 32.4 G/DL (ref 30.7–35.5)
MCV RBC AUTO: 90.8 FL (ref 80–100)
PDW BLD-RTO: 13.9 %
PLATELET # BLD: 258 K/UL (ref 140–400)
PMV BLD AUTO: 9.9 FL (ref 7.2–11.7)
RBC # BLD: 3.37 M/UL (ref 4.14–5.8)
WBC # BLD: 8.8 K/UL (ref 3.5–10.9)

## 2025-04-17 LAB
HCT VFR BLD CALC: 29.9 % (ref 37.5–51)
HEMOGLOBIN: 9.9 G/DL (ref 13–17.7)
MCH RBC QN AUTO: 29.4 PG (ref 26–34)
MCHC RBC AUTO-ENTMCNC: 33.1 G/DL (ref 30.7–35.5)
MCV RBC AUTO: 88.7 FL (ref 80–100)
PDW BLD-RTO: 14.6 %
PLATELET # BLD: 299 K/UL (ref 140–400)
PMV BLD AUTO: 10.2 FL (ref 7.2–11.7)
RBC # BLD: 3.37 M/UL (ref 4.14–5.8)
WBC # BLD: 10.6 K/UL (ref 3.5–10.9)

## 2025-04-25 LAB
BASOPHILS ABSOLUTE: 0.1 K/UL (ref 0–0.3)
BASOPHILS RELATIVE PERCENT: 0.7 % (ref 0–2)
BUN / CREAT RATIO: 27 (ref 7–25)
BUN BLDV-MCNC: 38 MG/DL (ref 3–29)
CALCIUM SERPL-MCNC: 9.4 MG/DL (ref 8.5–10.5)
CALCIUM SERPL-MCNC: 9.4 MG/DL (ref 8.5–10.5)
CHLORIDE BLD-SCNC: 104 MEQ/L (ref 96–110)
CO2: 22 MEQ/L (ref 19–32)
CREAT SERPL-MCNC: 1.4 MG/DL (ref 0.5–1.2)
DIFFERENTIAL COUNT: ABNORMAL
EOSINOPHILS ABSOLUTE: 0.3 K/UL (ref 0–0.5)
EOSINOPHILS RELATIVE PERCENT: 2.4 % (ref 0–5)
ESTIMATED GLOMERULAR FILTRATION RATE CREATININE EQUATION: 50 MLS/MIN/1.73M2
FASTING STATUS: ABNORMAL
FASTING STATUS: ABNORMAL
GLUCOSE BLD-MCNC: ABNORMAL MG/DL (ref 70–99)
HCT VFR BLD CALC: 32.2 % (ref 37.5–51)
HEMOGLOBIN: 10.5 G/DL (ref 13–17.7)
IMMATURE GRANS (ABS): 0 K/UL (ref 0–0.1)
IMMATURE GRANULOCYTES %: 0.3 %
LYMPHOCYTES ABSOLUTE: 2.9 K/UL (ref 0.9–4.1)
LYMPHOCYTES RELATIVE PERCENT: 28 % (ref 14–51)
MCH RBC QN AUTO: 29.1 PG (ref 26–34)
MCHC RBC AUTO-ENTMCNC: 32.6 G/DL (ref 30.7–35.5)
MCV RBC AUTO: 89.2 FL (ref 80–100)
MONOCYTES ABSOLUTE: 1 K/UL (ref 0.2–1)
MONOCYTES RELATIVE PERCENT: 10.1 % (ref 4–12)
NEUTROPHILS ABSOLUTE: 6 K/UL (ref 1.8–7.5)
NEUTROPHILS RELATIVE PERCENT: 58.5 % (ref 42–80)
PDW BLD-RTO: 14.8 %
PLATELET # BLD: 299 K/UL (ref 140–400)
PMV BLD AUTO: 10 FL (ref 7.2–11.7)
POTASSIUM SERPL-SCNC: 4.1 MEQ/L (ref 3.4–5.3)
RBC # BLD: 3.61 M/UL (ref 4.14–5.8)
RETICULOCYTE ABSOLUTE COUNT: 0 /100 WBC
SODIUM BLD-SCNC: 141 MEQ/L (ref 135–148)
WBC # BLD: 10.3 K/UL (ref 3.5–10.9)

## 2025-04-26 LAB
BACTERIA, URINE: ABNORMAL /HPF
BILIRUBIN, URINE: NEGATIVE MG/DL
CLARITY, UA: CLEAR
COLOR, UA: YELLOW
EPITHELIAL CELLS, UA: ABNORMAL /HPF (ref 0–5)
ERYTHROCYTES URINE: ABNORMAL /HPF (ref 0–2)
GLUCOSE URINE: NEGATIVE MG/DL
KETONES, URINE: NEGATIVE MG/DL
LEUKOCYTE ESTERASE, URINE: NEGATIVE
LEUKOCYTES, UA: ABNORMAL /HPF (ref 0–5)
MUCUS: PRESENT
NITRITE, URINE: NEGATIVE
PH, URINE: 5.5 (ref 4.5–8)
PROTEIN, URINE: NEGATIVE MG/DL
SPECIFIC GRAVITY UA: 1.02 (ref 1–1.03)
URINE HGB: NEGATIVE MG/DL
UROBILINOGEN, URINE: <2 MG/DL (ref 0–2)

## 2025-04-28 LAB
BUN / CREAT RATIO: 34 (ref 7–25)
BUN BLDV-MCNC: 37 MG/DL (ref 3–29)
CALCIUM SERPL-MCNC: 8.9 MG/DL (ref 8.5–10.5)
CHLORIDE BLD-SCNC: 104 MEQ/L (ref 96–110)
CO2: 25 MEQ/L (ref 19–32)
CREAT SERPL-MCNC: 1.1 MG/DL (ref 0.5–1.2)
ESTIMATED GLOMERULAR FILTRATION RATE CREATININE EQUATION: 67 MLS/MIN/1.73M2
FASTING STATUS: ABNORMAL
GLUCOSE BLD-MCNC: 56 MG/DL (ref 70–99)
HCT VFR BLD CALC: 31.4 % (ref 37.5–51)
HEMOGLOBIN: 10.3 G/DL (ref 13–17.7)
MCH RBC QN AUTO: 29.6 PG (ref 26–34)
MCHC RBC AUTO-ENTMCNC: 32.8 G/DL (ref 30.7–35.5)
MCV RBC AUTO: 90.2 FL (ref 80–100)
PDW BLD-RTO: 14.8 %
PLATELET # BLD: 268 K/UL (ref 140–400)
PMV BLD AUTO: 10 FL (ref 7.2–11.7)
POTASSIUM SERPL-SCNC: 4.2 MEQ/L (ref 3.4–5.3)
RBC # BLD: 3.48 M/UL (ref 4.14–5.8)
SODIUM BLD-SCNC: 139 MEQ/L (ref 135–148)
WBC # BLD: 8.8 K/UL (ref 3.5–10.9)

## 2025-04-29 LAB
FASTING STATUS: ABNORMAL
GLUCOSE BLD-MCNC: 60 MG/DL (ref 70–99)

## 2025-05-07 LAB — HBA1C MFR BLD: 6.7 %

## 2025-08-06 LAB
HBA1C MFR BLD: 6.3 %
HCT VFR BLD CALC: 34 % (ref 37.5–51)
HEMOGLOBIN: 11 G/DL (ref 13–17.7)
MCH RBC QN AUTO: 29.9 PG (ref 26–34)
MCHC RBC AUTO-ENTMCNC: 32.4 G/DL (ref 30.7–35.5)
MCV RBC AUTO: 92.4 FL (ref 80–100)
PDW BLD-RTO: 14.3 %
PLATELET # BLD: 233 K/UL (ref 140–400)
PMV BLD AUTO: 10 FL (ref 7.2–11.7)
RBC # BLD: 3.68 M/UL (ref 4.14–5.8)
WBC # BLD: 9.3 K/UL (ref 3.5–10.9)